# Patient Record
Sex: FEMALE | Race: WHITE | Employment: OTHER | URBAN - METROPOLITAN AREA
[De-identification: names, ages, dates, MRNs, and addresses within clinical notes are randomized per-mention and may not be internally consistent; named-entity substitution may affect disease eponyms.]

---

## 2017-01-13 ENCOUNTER — OFFICE VISIT (OUTPATIENT)
Dept: SURGERY | Age: 77
End: 2017-01-13

## 2017-01-13 VITALS
HEART RATE: 76 BPM | WEIGHT: 158 LBS | RESPIRATION RATE: 16 BRPM | BODY MASS INDEX: 29.08 KG/M2 | SYSTOLIC BLOOD PRESSURE: 130 MMHG | HEIGHT: 62 IN | TEMPERATURE: 97.6 F | DIASTOLIC BLOOD PRESSURE: 86 MMHG

## 2017-01-13 DIAGNOSIS — K43.2 INCISIONAL HERNIA, WITHOUT OBSTRUCTION OR GANGRENE: Primary | ICD-10-CM

## 2017-01-13 RX ORDER — SODIUM CHLORIDE 0.9 % (FLUSH) 0.9 %
5-10 SYRINGE (ML) INJECTION EVERY 8 HOURS
Status: CANCELLED | OUTPATIENT
Start: 2017-01-13

## 2017-01-13 RX ORDER — SODIUM CHLORIDE 0.9 % (FLUSH) 0.9 %
5-10 SYRINGE (ML) INJECTION AS NEEDED
Status: CANCELLED | OUTPATIENT
Start: 2017-01-13

## 2017-01-13 NOTE — PROGRESS NOTES
Patient is a 67year old female presenting for a follow up MRI test and breast exam. Patient reports she has lump  to upper chest. Patient feels food gets stuck in chest after eating. Denies any pain or changes in breasts.

## 2017-01-13 NOTE — PROGRESS NOTES
General Surgery Consult    Subjective:      HPI: Patient is a very pleasant 60-year-old female with past medical history that is remarkable for hypertension, hypercholesterolemia, vitamin D deficiency coronary artery disease and stage I left breast cancer. She presents today with complaints of a bulge in the epigastrium of the abdomen. She has pain at this bulge. She states that the bulge has been present since her heart surgery and increase in size and symptoms. She had a coronary artery bypass graft and chest tubes. She reports feeling of indigestion and burning at the hernia site.     Patient Active Problem List    Diagnosis Date Noted    Coronary artery disease involving native coronary artery of native heart without angina pectoris 12/16/2016    Precordial pain 12/16/2016    Coronary artery disease involving native coronary artery of native heart with angina pectoris ([facility] [address] 10/30/2016    Personal history of malignant neoplasm of breast 03/29/2016    Pre-operative cardiovascular examination 03/26/2014    Abnormal finding on EKG 03/26/2014    S/P partial mastectomy, left, with sentinel lymph node biopsy, 5/11     FHx: breast cancer     Breast cancer ([facility] [address] 12/06/2011    Insomnia 09/13/2011    Ataxic gait 10/21/2010    Vitamin D deficiency 09/14/2010    HTN (hypertension) 03/27/2010    Hypercholesteremia 03/27/2010    Anxiety 03/27/2010     Past Medical History   Diagnosis Date    Abnormal finding on EKG 3/26/2014     s/o inf wall mi asymptomatic     Anxiety     Ataxic gait 10/21/2010    Breast cancer ([facility] [address]     Diverticulosis     FHx: breast cancer     Herpes     Hypercholesterolemia     Hypertension     Insomnia     Osteopenia     Osteoporosis screening 2008     WNL per patient, GYN orders    Pap smear 8/2010     GYN, normal    Pre-operative cardiovascular examination 3/26/2014     for shoulder surgery     S/P partial mastectomy, left, with sentinel lymph node biopsy, 5/11     Screening mammogram 6/2010     GYN orders      Past Surgical History   Procedure Laterality Date    Hx breast biopsy       right (2000), left (2011)    Colonoscopy  09/2008    Hx hysterectomy  10/2009     complete, fibroid    Hx mastectomy  6./8/11     partial left with sentinel lymph node biopsy    Hx mastectomy Left 9/10/2014     LEFT PARTIAL MASTECTOMY performed by Carol Evans MD at [address] [place] [address] Hx back surgery  1980    Hx orthopaedic       left lower leg surgery at age 11   [facility]ock Hx orthopaedic Right 7/2014     ROTATOR CUFF    Pr cardiac surg procedure unlist       triple bypass      Family History   Problem Relation Age of Onset    Cancer Mother 48     Breast    Breast Cancer Mother 48    Heart Disease Father     Cancer Sister 76     Breast    Breast Cancer Sister 76    Heart Disease Brother     Diabetes Brother     Diabetes Sister       Social History   Substance Use Topics    Smoking status: Former Smoker    Smokeless tobacco: Never Used      Comment: quit age 26    Alcohol use Yes      Comment: occasional      Allergies   Allergen Reactions    Latex Unable to Obtain    Adhesive Hives    Codeine Nausea Only     Pt states she is not allergic. Prior to Admission medications    Medication Sig Start Date End Date Taking? Authorizing Provider   cholecalciferol, vitamin D3, (VITAMIN D3) 2,000 unit tab Take  by mouth daily. Yes Historical Provider   CALCIUM PO Take 1,200 mg by mouth daily. Yes Historical Provider   triamterene-hydroCHLOROthiazide (MAXZIDE) 75-50 mg per tablet Take 0.5 Tabs by mouth daily. Yes Historical Provider   metoprolol succinate (TOPROL-XL) 25 mg XL tablet Take 25 mg by mouth daily. Yes Historical Provider   pitavastatin (LIVALO) 4 mg tab tablet Take  by mouth daily. Yes Historical Provider   aspirin delayed-release 81 mg tablet Take 81 mg by mouth daily. 10/23/15  Yes Historical Provider   clonazePAM (KLONOPIN) 0.5 mg tablet 0.5 mg daily.    Yes Historical Provider   MULTIVITAMINS (MULTIVITAMIN PO) Take  by mouth daily. Yes Historical Provider       Review of Systems:    14 systems were reviewed. The results are as above in the HPI and otherwise negative. Objective:     Vitals:    01/13/17 1417   BP: 130/86   Pulse: 76   Resp: 16   Temp: 97.6 °F (36.4 °C)   TempSrc: Oral   Weight: 71.7 kg (158 lb)   Height: 5' 2\" (1.575 m)       Physical Exam:  GENERAL: alert, cooperative, no distress, appears stated age,   EYE: conjunctivae/corneas clear. PERRL, EOM's intact. Fundi benign,  THROAT & NECK: normal and no erythema or exudates noted. ,    LYMPHATIC: Cervical, supraclavicular, and axillary nodes normal. ,   LUNG: clear to auscultation bilaterally,   HEART: regular rate and rhythm, S1, S2 normal, no murmur, click, rub or gallop,   ABDOMEN: soft, nondistended, tender at the reducible epigastric hernia which measures approximately 5 cm in diameter. . Bowel sounds normal. No masses,  no organomegaly,   EXTREMITIES:  extremities normal, atraumatic, no cyanosis or edema,   SKIN: Normal.,   NEUROLOGIC: AOx3. Gait normal. Reflexes and motor strength normal and symmetric. Cranial nerves 2-12 and sensation grossly intact. ,     Data Review: To be done    Impression:     · Patient with extensive past medical history with peripheral increased risk of cardiac event or stroke during surgery including hypertension, hypercholesterolemia, coronary artery disease. She has a symptomatic epigastric incisional hernia.     Plan:     · Robot-assisted laparoscopic incisional hernia repair with mesh  · Consent on chart  · Preoperative orders written    Signed By: Giovana Bell MD     January 13, 2017

## 2017-01-16 ENCOUNTER — HOSPITAL ENCOUNTER (OUTPATIENT)
Dept: PREADMISSION TESTING | Age: 77
Discharge: HOME | End: 2017-01-16
Payer: MEDICARE

## 2017-01-16 ENCOUNTER — HOSPITAL ENCOUNTER (OUTPATIENT)
Dept: GENERAL RADIOLOGY | Age: 77
Discharge: HOME | End: 2017-01-16
Payer: MEDICARE

## 2017-01-16 DIAGNOSIS — Z01.818 PREOP TESTING: Primary | ICD-10-CM

## 2017-01-16 DIAGNOSIS — Z01.818 PREOP TESTING: ICD-10-CM

## 2017-01-16 DIAGNOSIS — K43.2 INCISIONAL HERNIA, WITHOUT OBSTRUCTION OR GANGRENE: ICD-10-CM

## 2017-01-16 LAB
ANION GAP BLD CALC-SCNC: 10 MMOL/L (ref 3–18)
BASOPHILS # BLD AUTO: 0 K/UL (ref 0–0.1)
BASOPHILS # BLD: 0 % (ref 0–2)
BUN SERPL-MCNC: 29 MG/DL (ref 7–18)
BUN/CREAT SERPL: 28 (ref 12–20)
CALCIUM SERPL-MCNC: 10.1 MG/DL (ref 8.5–10.1)
CHLORIDE SERPL-SCNC: 103 MMOL/L (ref 100–108)
CO2 SERPL-SCNC: 27 MMOL/L (ref 21–32)
CREAT SERPL-MCNC: 1.02 MG/DL (ref 0.6–1.3)
DIFFERENTIAL METHOD BLD: ABNORMAL
EOSINOPHIL # BLD: 0.2 K/UL (ref 0–0.4)
EOSINOPHIL NFR BLD: 3 % (ref 0–5)
ERYTHROCYTE [DISTWIDTH] IN BLOOD BY AUTOMATED COUNT: 14.6 % (ref 11.6–14.5)
GLUCOSE SERPL-MCNC: 99 MG/DL (ref 74–99)
HCT VFR BLD AUTO: 45 % (ref 35–45)
HGB BLD-MCNC: 14.7 G/DL (ref 12–16)
LYMPHOCYTES # BLD AUTO: 44 % (ref 21–52)
LYMPHOCYTES # BLD: 3 K/UL (ref 0.9–3.6)
MCH RBC QN AUTO: 31 PG (ref 24–34)
MCHC RBC AUTO-ENTMCNC: 32.7 G/DL (ref 31–37)
MCV RBC AUTO: 94.9 FL (ref 74–97)
MONOCYTES # BLD: 0.6 K/UL (ref 0.05–1.2)
MONOCYTES NFR BLD AUTO: 9 % (ref 3–10)
NEUTS SEG # BLD: 3 K/UL (ref 1.8–8)
NEUTS SEG NFR BLD AUTO: 44 % (ref 40–73)
PLATELET # BLD AUTO: 257 K/UL (ref 135–420)
PMV BLD AUTO: 9.7 FL (ref 9.2–11.8)
POTASSIUM SERPL-SCNC: 4.5 MMOL/L (ref 3.5–5.5)
RBC # BLD AUTO: 4.74 M/UL (ref 4.2–5.3)
SODIUM SERPL-SCNC: 140 MMOL/L (ref 136–145)
WBC # BLD AUTO: 6.8 K/UL (ref 4.6–13.2)

## 2017-01-16 PROCEDURE — 71020 XR CHEST PA LAT: CPT

## 2017-01-16 PROCEDURE — 80048 BASIC METABOLIC PNL TOTAL CA: CPT | Performed by: SURGERY

## 2017-01-16 PROCEDURE — 36415 COLL VENOUS BLD VENIPUNCTURE: CPT | Performed by: SURGERY

## 2017-01-16 PROCEDURE — 85025 COMPLETE CBC W/AUTO DIFF WBC: CPT | Performed by: SURGERY

## 2017-01-16 PROCEDURE — 93005 ELECTROCARDIOGRAM TRACING: CPT

## 2017-01-17 LAB
ATRIAL RATE: 74 BPM
CALCULATED P AXIS, ECG09: 30 DEGREES
CALCULATED R AXIS, ECG10: 10 DEGREES
CALCULATED T AXIS, ECG11: 43 DEGREES
DIAGNOSIS, 93000: NORMAL
P-R INTERVAL, ECG05: 180 MS
Q-T INTERVAL, ECG07: 398 MS
QRS DURATION, ECG06: 70 MS
QTC CALCULATION (BEZET), ECG08: 441 MS
VENTRICULAR RATE, ECG03: 74 BPM

## 2017-01-18 ENCOUNTER — ANESTHESIA EVENT (OUTPATIENT)
Dept: SURGERY | Age: 77
End: 2017-01-18
Payer: MEDICARE

## 2017-01-19 ENCOUNTER — ANESTHESIA (OUTPATIENT)
Dept: SURGERY | Age: 77
End: 2017-01-19
Payer: MEDICARE

## 2017-01-19 ENCOUNTER — HOSPITAL ENCOUNTER (OUTPATIENT)
Age: 77
LOS: 1 days | Discharge: HOME | End: 2017-01-19
Attending: SURGERY | Admitting: SURGERY
Payer: MEDICARE

## 2017-01-19 ENCOUNTER — SURGERY (OUTPATIENT)
Age: 77
End: 2017-01-19

## 2017-01-19 VITALS
DIASTOLIC BLOOD PRESSURE: 73 MMHG | TEMPERATURE: 96 F | BODY MASS INDEX: 29.08 KG/M2 | SYSTOLIC BLOOD PRESSURE: 121 MMHG | RESPIRATION RATE: 17 BRPM | HEART RATE: 84 BPM | WEIGHT: 158 LBS | OXYGEN SATURATION: 92 % | HEIGHT: 62 IN

## 2017-01-19 PROBLEM — K43.2 INCISIONAL HERNIA WITHOUT MENTION OF OBSTRUCTION OR GANGRENE: Status: ACTIVE | Noted: 2017-01-19

## 2017-01-19 PROCEDURE — 77030012012 HC AIRWY OP SFT TELE -A: Performed by: NURSE ANESTHETIST, CERTIFIED REGISTERED

## 2017-01-19 PROCEDURE — 77030012058: Performed by: SURGERY

## 2017-01-19 PROCEDURE — 74011250637 HC RX REV CODE- 250/637: Performed by: SURGERY

## 2017-01-19 PROCEDURE — 77030008683 HC TU ET CUF COVD -A: Performed by: NURSE ANESTHETIST, CERTIFIED REGISTERED

## 2017-01-19 PROCEDURE — 76210000028 HC REC RM PH II 4 TO 4.5 HR: Performed by: SURGERY

## 2017-01-19 PROCEDURE — 77030020788: Performed by: SURGERY

## 2017-01-19 PROCEDURE — 77030002933 HC SUT MCRYL J&J -A: Performed by: SURGERY

## 2017-01-19 PROCEDURE — 74011000250 HC RX REV CODE- 250: Performed by: SURGERY

## 2017-01-19 PROCEDURE — 77030026438 HC STYL ET INTUB CARD -A: Performed by: NURSE ANESTHETIST, CERTIFIED REGISTERED

## 2017-01-19 PROCEDURE — 76010000875 HC OR TIME 1.5 TO 2HR INTENSV - TIER 2: Performed by: SURGERY

## 2017-01-19 PROCEDURE — 76210000017 HC OR PH I REC 1.5 TO 2 HR: Performed by: SURGERY

## 2017-01-19 PROCEDURE — 74011250636 HC RX REV CODE- 250/636: Performed by: NURSE ANESTHETIST, CERTIFIED REGISTERED

## 2017-01-19 PROCEDURE — 74011250636 HC RX REV CODE- 250/636

## 2017-01-19 PROCEDURE — C1781 MESH (IMPLANTABLE): HCPCS | Performed by: SURGERY

## 2017-01-19 PROCEDURE — 77030034850: Performed by: SURGERY

## 2017-01-19 PROCEDURE — 77030032490 HC SLV COMPR SCD KNE COVD -B: Performed by: SURGERY

## 2017-01-19 PROCEDURE — 74011000250 HC RX REV CODE- 250

## 2017-01-19 PROCEDURE — 77030031139 HC SUT VCRL2 J&J -A: Performed by: SURGERY

## 2017-01-19 PROCEDURE — 77030033263 HC DRSG MEPILEX 16-48IN BORD MOLN -B: Performed by: SURGERY

## 2017-01-19 PROCEDURE — 77030021454 HC SUT VLOC ABS COVD -B: Performed by: SURGERY

## 2017-01-19 PROCEDURE — 76060000034 HC ANESTHESIA 1.5 TO 2 HR: Performed by: SURGERY

## 2017-01-19 PROCEDURE — 77030010939 HC CLP LIG TELE -B: Performed by: SURGERY

## 2017-01-19 PROCEDURE — 77030009848 HC PASSR SUT SET COOP -C: Performed by: SURGERY

## 2017-01-19 PROCEDURE — 77030016151 HC PROTCTR LNS DFOG COVD -B: Performed by: SURGERY

## 2017-01-19 PROCEDURE — 77030035045 HC TRCR ENDOSC VRSPRT BLDLSS COVD -B: Performed by: SURGERY

## 2017-01-19 PROCEDURE — 77030018836 HC SOL IRR NACL ICUM -A: Performed by: SURGERY

## 2017-01-19 PROCEDURE — 74011250637 HC RX REV CODE- 250/637: Performed by: NURSE ANESTHETIST, CERTIFIED REGISTERED

## 2017-01-19 PROCEDURE — 77030020782 HC GWN BAIR PAWS FLX 3M -B: Performed by: SURGERY

## 2017-01-19 PROCEDURE — 77030011640 HC PAD GRND REM COVD -A: Performed by: SURGERY

## 2017-01-19 PROCEDURE — 77030014086 HC TRCR ENDOSC AMR -B: Performed by: SURGERY

## 2017-01-19 PROCEDURE — 74011250636 HC RX REV CODE- 250/636: Performed by: SURGERY

## 2017-01-19 PROCEDURE — 77030010507 HC ADH SKN DERMBND J&J -B: Performed by: SURGERY

## 2017-01-19 PROCEDURE — 77030018706 HC CORD MPLR COVD -A: Performed by: SURGERY

## 2017-01-19 DEVICE — MESH W/ECHO PS 15.2CM CIR -- VENTRALIGHT ORDER BY CA: Type: IMPLANTABLE DEVICE | Site: ABDOMEN | Status: FUNCTIONAL

## 2017-01-19 RX ORDER — FENTANYL CITRATE 50 UG/ML
INJECTION, SOLUTION INTRAMUSCULAR; INTRAVENOUS AS NEEDED
Status: DISCONTINUED | OUTPATIENT
Start: 2017-01-19 | End: 2017-01-19 | Stop reason: HOSPADM

## 2017-01-19 RX ORDER — SODIUM CHLORIDE 0.9 % (FLUSH) 0.9 %
5-10 SYRINGE (ML) INJECTION EVERY 8 HOURS
Status: DISCONTINUED | OUTPATIENT
Start: 2017-01-19 | End: 2017-01-19 | Stop reason: HOSPADM

## 2017-01-19 RX ORDER — ONDANSETRON 2 MG/ML
INJECTION INTRAMUSCULAR; INTRAVENOUS AS NEEDED
Status: DISCONTINUED | OUTPATIENT
Start: 2017-01-19 | End: 2017-01-19 | Stop reason: HOSPADM

## 2017-01-19 RX ORDER — LIDOCAINE HYDROCHLORIDE 20 MG/ML
INJECTION, SOLUTION EPIDURAL; INFILTRATION; INTRACAUDAL; PERINEURAL AS NEEDED
Status: DISCONTINUED | OUTPATIENT
Start: 2017-01-19 | End: 2017-01-19 | Stop reason: HOSPADM

## 2017-01-19 RX ORDER — BUPIVACAINE HYDROCHLORIDE AND EPINEPHRINE 2.5; 5 MG/ML; UG/ML
INJECTION, SOLUTION EPIDURAL; INFILTRATION; INTRACAUDAL; PERINEURAL AS NEEDED
Status: DISCONTINUED | OUTPATIENT
Start: 2017-01-19 | End: 2017-01-19 | Stop reason: HOSPADM

## 2017-01-19 RX ORDER — MIDAZOLAM HYDROCHLORIDE 1 MG/ML
INJECTION, SOLUTION INTRAMUSCULAR; INTRAVENOUS AS NEEDED
Status: DISCONTINUED | OUTPATIENT
Start: 2017-01-19 | End: 2017-01-19 | Stop reason: HOSPADM

## 2017-01-19 RX ORDER — SODIUM CHLORIDE, SODIUM LACTATE, POTASSIUM CHLORIDE, CALCIUM CHLORIDE 600; 310; 30; 20 MG/100ML; MG/100ML; MG/100ML; MG/100ML
75 INJECTION, SOLUTION INTRAVENOUS CONTINUOUS
Status: DISCONTINUED | OUTPATIENT
Start: 2017-01-19 | End: 2017-01-19 | Stop reason: HOSPADM

## 2017-01-19 RX ORDER — NEOSTIGMINE METHYLSULFATE 5 MG/5 ML
SYRINGE (ML) INTRAVENOUS AS NEEDED
Status: DISCONTINUED | OUTPATIENT
Start: 2017-01-19 | End: 2017-01-19 | Stop reason: HOSPADM

## 2017-01-19 RX ORDER — GLYCOPYRROLATE 0.2 MG/ML
INJECTION INTRAMUSCULAR; INTRAVENOUS AS NEEDED
Status: DISCONTINUED | OUTPATIENT
Start: 2017-01-19 | End: 2017-01-19 | Stop reason: HOSPADM

## 2017-01-19 RX ORDER — DOCUSATE SODIUM 100 MG/1
100 CAPSULE, LIQUID FILLED ORAL 2 TIMES DAILY
Qty: 60 CAP | Refills: 0 | Status: SHIPPED | OUTPATIENT
Start: 2017-01-19 | End: 2017-02-18

## 2017-01-19 RX ORDER — FAMOTIDINE 20 MG/1
20 TABLET, FILM COATED ORAL ONCE
Status: COMPLETED | OUTPATIENT
Start: 2017-01-19 | End: 2017-01-19

## 2017-01-19 RX ORDER — SODIUM CHLORIDE 0.9 % (FLUSH) 0.9 %
5-10 SYRINGE (ML) INJECTION AS NEEDED
Status: DISCONTINUED | OUTPATIENT
Start: 2017-01-19 | End: 2017-01-19 | Stop reason: HOSPADM

## 2017-01-19 RX ORDER — CEFAZOLIN SODIUM 2 G/50ML
2 SOLUTION INTRAVENOUS ONCE
Status: COMPLETED | OUTPATIENT
Start: 2017-01-19 | End: 2017-01-19

## 2017-01-19 RX ORDER — SUCCINYLCHOLINE CHLORIDE 20 MG/ML
INJECTION INTRAMUSCULAR; INTRAVENOUS AS NEEDED
Status: DISCONTINUED | OUTPATIENT
Start: 2017-01-19 | End: 2017-01-19 | Stop reason: HOSPADM

## 2017-01-19 RX ORDER — HYDROCODONE BITARTRATE AND ACETAMINOPHEN 5; 325 MG/1; MG/1
1-2 TABLET ORAL
Status: COMPLETED | OUTPATIENT
Start: 2017-01-19 | End: 2017-01-19

## 2017-01-19 RX ORDER — KETOROLAC TROMETHAMINE 30 MG/ML
INJECTION, SOLUTION INTRAMUSCULAR; INTRAVENOUS AS NEEDED
Status: DISCONTINUED | OUTPATIENT
Start: 2017-01-19 | End: 2017-01-19 | Stop reason: HOSPADM

## 2017-01-19 RX ORDER — HYDROMORPHONE HYDROCHLORIDE 2 MG/ML
0.5 INJECTION, SOLUTION INTRAMUSCULAR; INTRAVENOUS; SUBCUTANEOUS
Status: DISCONTINUED | OUTPATIENT
Start: 2017-01-19 | End: 2017-01-19 | Stop reason: HOSPADM

## 2017-01-19 RX ORDER — PROPOFOL 10 MG/ML
INJECTION, EMULSION INTRAVENOUS AS NEEDED
Status: DISCONTINUED | OUTPATIENT
Start: 2017-01-19 | End: 2017-01-19 | Stop reason: HOSPADM

## 2017-01-19 RX ORDER — EPHEDRINE SULFATE/0.9% NACL/PF 25 MG/5 ML
SYRINGE (ML) INTRAVENOUS AS NEEDED
Status: DISCONTINUED | OUTPATIENT
Start: 2017-01-19 | End: 2017-01-19 | Stop reason: HOSPADM

## 2017-01-19 RX ORDER — ROCURONIUM BROMIDE 10 MG/ML
INJECTION, SOLUTION INTRAVENOUS AS NEEDED
Status: DISCONTINUED | OUTPATIENT
Start: 2017-01-19 | End: 2017-01-19 | Stop reason: HOSPADM

## 2017-01-19 RX ORDER — HYDROCODONE BITARTRATE AND ACETAMINOPHEN 5; 325 MG/1; MG/1
2 TABLET ORAL
Qty: 60 TAB | Refills: 0 | Status: SHIPPED | OUTPATIENT
Start: 2017-01-19 | End: 2017-02-01 | Stop reason: ALTCHOICE

## 2017-01-19 RX ORDER — DEXAMETHASONE SODIUM PHOSPHATE 4 MG/ML
INJECTION, SOLUTION INTRA-ARTICULAR; INTRALESIONAL; INTRAMUSCULAR; INTRAVENOUS; SOFT TISSUE AS NEEDED
Status: DISCONTINUED | OUTPATIENT
Start: 2017-01-19 | End: 2017-01-19 | Stop reason: HOSPADM

## 2017-01-19 RX ADMIN — PROPOFOL 100 MG: 10 INJECTION, EMULSION INTRAVENOUS at 08:33

## 2017-01-19 RX ADMIN — ROCURONIUM BROMIDE 20 MG: 10 INJECTION, SOLUTION INTRAVENOUS at 08:45

## 2017-01-19 RX ADMIN — LIDOCAINE HYDROCHLORIDE 20 MG: 20 INJECTION, SOLUTION EPIDURAL; INFILTRATION; INTRACAUDAL; PERINEURAL at 08:33

## 2017-01-19 RX ADMIN — FENTANYL CITRATE 50 MCG: 50 INJECTION, SOLUTION INTRAMUSCULAR; INTRAVENOUS at 08:33

## 2017-01-19 RX ADMIN — ONDANSETRON 4 MG: 2 INJECTION INTRAMUSCULAR; INTRAVENOUS at 09:27

## 2017-01-19 RX ADMIN — GLYCOPYRROLATE 0.6 MG: 0.2 INJECTION INTRAMUSCULAR; INTRAVENOUS at 10:03

## 2017-01-19 RX ADMIN — FENTANYL CITRATE 50 MCG: 50 INJECTION, SOLUTION INTRAMUSCULAR; INTRAVENOUS at 10:16

## 2017-01-19 RX ADMIN — FENTANYL CITRATE 50 MCG: 50 INJECTION, SOLUTION INTRAMUSCULAR; INTRAVENOUS at 09:00

## 2017-01-19 RX ADMIN — CEFAZOLIN SODIUM 2 G: 2 SOLUTION INTRAVENOUS at 08:27

## 2017-01-19 RX ADMIN — Medication 5 MG: at 08:42

## 2017-01-19 RX ADMIN — BUPIVACAINE HYDROCHLORIDE AND EPINEPHRINE BITARTRATE 30 ML: 2.5; .0091 INJECTION, SOLUTION EPIDURAL; INFILTRATION; INTRACAUDAL; PERINEURAL at 09:17

## 2017-01-19 RX ADMIN — HYDROCODONE BITARTRATE AND ACETAMINOPHEN 2 TABLET: 5; 325 TABLET ORAL at 12:36

## 2017-01-19 RX ADMIN — Medication 5 MG: at 09:20

## 2017-01-19 RX ADMIN — FAMOTIDINE 20 MG: 20 TABLET ORAL at 07:03

## 2017-01-19 RX ADMIN — Medication 3 MG: at 10:03

## 2017-01-19 RX ADMIN — MIDAZOLAM HYDROCHLORIDE 2 MG: 1 INJECTION, SOLUTION INTRAMUSCULAR; INTRAVENOUS at 08:27

## 2017-01-19 RX ADMIN — SODIUM CHLORIDE, SODIUM LACTATE, POTASSIUM CHLORIDE, AND CALCIUM CHLORIDE 75 ML/HR: 600; 310; 30; 20 INJECTION, SOLUTION INTRAVENOUS at 11:22

## 2017-01-19 RX ADMIN — DEXAMETHASONE SODIUM PHOSPHATE 4 MG: 4 INJECTION, SOLUTION INTRA-ARTICULAR; INTRALESIONAL; INTRAMUSCULAR; INTRAVENOUS; SOFT TISSUE at 08:40

## 2017-01-19 RX ADMIN — PROPOFOL 30 MG: 10 INJECTION, EMULSION INTRAVENOUS at 09:06

## 2017-01-19 RX ADMIN — HYDROMORPHONE HYDROCHLORIDE 0.5 MG: 2 INJECTION, SOLUTION INTRAMUSCULAR; INTRAVENOUS; SUBCUTANEOUS at 11:22

## 2017-01-19 RX ADMIN — FENTANYL CITRATE 50 MCG: 50 INJECTION, SOLUTION INTRAMUSCULAR; INTRAVENOUS at 10:21

## 2017-01-19 RX ADMIN — SODIUM CHLORIDE, SODIUM LACTATE, POTASSIUM CHLORIDE, AND CALCIUM CHLORIDE 75 ML/HR: 600; 310; 30; 20 INJECTION, SOLUTION INTRAVENOUS at 07:33

## 2017-01-19 RX ADMIN — ROCURONIUM BROMIDE 5 MG: 10 INJECTION, SOLUTION INTRAVENOUS at 09:22

## 2017-01-19 RX ADMIN — HYDROMORPHONE HYDROCHLORIDE 0.5 MG: 2 INJECTION, SOLUTION INTRAMUSCULAR; INTRAVENOUS; SUBCUTANEOUS at 11:06

## 2017-01-19 RX ADMIN — HYDROMORPHONE HYDROCHLORIDE 0.5 MG: 2 INJECTION, SOLUTION INTRAMUSCULAR; INTRAVENOUS; SUBCUTANEOUS at 11:36

## 2017-01-19 RX ADMIN — KETOROLAC TROMETHAMINE 30 MG: 30 INJECTION, SOLUTION INTRAMUSCULAR; INTRAVENOUS at 10:03

## 2017-01-19 RX ADMIN — Medication 5 MG: at 08:43

## 2017-01-19 RX ADMIN — Medication 5 MG: at 08:45

## 2017-01-19 RX ADMIN — SUCCINYLCHOLINE CHLORIDE 100 MG: 20 INJECTION INTRAMUSCULAR; INTRAVENOUS at 08:33

## 2017-01-19 RX ADMIN — FENTANYL CITRATE 50 MCG: 50 INJECTION, SOLUTION INTRAMUSCULAR; INTRAVENOUS at 10:27

## 2017-01-19 NOTE — PROGRESS NOTES
conducted a pre-surgery visit with Neli Thompson, who is a 67 y.o.,female. The  provided the following Interventions:  Initiated a relationship of care and support. Plan:  Chaplains will continue to follow and will provide pastoral care on an as needed/requested basis.  recommends bedside caregivers page  on duty if patient shows signs of acute spiritual or emotional distress.     [address] [facility] Certified [facility]   [phone]

## 2017-01-19 NOTE — INTERVAL H&P NOTE
H&P Update:  Henna Watson was seen and examined. History and physical has been reviewed. The patient has been examined.  There have been no significant clinical changes since the completion of the originally dated History and Physical.    Signed By: Matt Yi MD     January 19, 2017 2:32 PM

## 2017-01-19 NOTE — OP NOTES
[place] [address] [address]                                 OPERATIVE REPORT    PATIENT:     Moni Soliman  MRN:           [id number]    DATE:   2017  BILLIN  ROOM:        OR/PL  ATTENDING:   Rudolph Schumacher MD  DICTATING:   Rudolph Schumacher MD      PREOPERATIVE DIAGNOSIS: Ventral hernia. POSTOPERATIVE DIAGNOSIS: Same hernia. PROCEDURES PERFORMED: Robot-assisted laparoscopic incisional hernia repair  with mesh. SURGEON: Richmond Fletcher MD.    ASSISTANT: Matt Loya SA    ANESTHESIA: General and local (0.25% Marcaine with epinephrine). FINDINGS: Ventral hernia containing omentum. SPECIMENS REMOVED: None    ESTIMATED BLOOD LOSS: 20 mL. FLUIDS: 650 mL. OPERATIVE INDICATION: The patient with a painful ventral hernia. DESCRIPTION OF OPERATION:  The patient was identified in the holding area, where consent for robot assisted laparoscopic ventral  hernia repair with mesh was verified. The patient was taken to the operating room  where she was placed under general anesthesia in the supine position. The  left arm was tucked to the patient's side. The abdomen was prepped and  draped in a sterile fashion using chlorhexidine solution and sterile  drapes. Chasity Shape was used to add an additional protective barrier between the  skin and the foreign bodies. The time-out was performed to ensure correct  procedure. The local anesthetic was infiltrated into the skin and deep  dermal tissues at each trocar site prior to incision. The initial trocar was placed in the midline 5 cm below the umbilicus. This was to accommodate a 12 mm  blunt-tip trocar assembled to the 5 mm 0-degree scope to create the  Visi-Port system. Safe entry into the peritoneal cavity was visualized. The  pneumoperitoneum was obtained using carbon dioxide gas to 15 mmHg.  The  abdomen was briefly explored laparoscopically. The second trocar was placed in the left lower quadrant one handsbreath lateral to the umbilical trocar. This was a 8 mm blunt tipped trocar. A third trocar  was placed in the right lower quadrant on handsbreadth lateral to umbilical trocar. The 15.2 cm Ventralite ST mesh with the Echo positioning system was placed into the peritoneal cavity. Two of the 2-0 V-Loc sutures were placed into the peritoneal cavity. The robot was then docked. The patient's position prior to docking was supine. The scissor was used at arm #1. The prograsp was used at arm #2 with the camera inserted in the midaxillary trocar. The attention was turned first to the exploration of the umbilicus and the  omental attachments in this area. The omentum was dissected away from the hernia site with a combination of electrocautery and blunt dissection. The hernia sac was dissected out of the defect. The falciform ligament was then taken down cephalad. The balloon port of the Echo positioning system was then taken through the center of the  defect, and used to secure the mesh to the abdominal wall by inflating it. The V-Loc sutures were used to secure the mesh to the abdominal wall. The mesh set nicely against the abdominal wall. The Echo Positioning  System was removed. Two needles were removed from the peritoneal cavity. The fascia at the 12 mm trocar site was closed using 0 Vicryl suture in the Hardy fsboWOW fascial closure system. All trocars were removed under direct visualization. The additional local anesthetic was infiltrated into the skin and deep  dermal tissues at each trocar site. A 4-0 Vicryl suture was used to close  the skin at each trocar site, and Dermabond was used as a wound sealant. The patient tolerated the procedure very well. DISPOSITION: She was stable, extubated upon transport to the recovery  room.         Peggy Peralta MD      D: 1/19/2017

## 2017-01-19 NOTE — PROGRESS NOTES
Rigo Goodman  67 y.o.  [id number]  1940  [id number]    Referring physician: Surgeon(s):  Jorge L Simons MD    Procedure: Additional peripheral IV;      Size:  20 G    Side: left    Site: hand left, condition no redness,patent        Indication: additional IV access      Michael Durand MD  1/19/2017  9:50 AM

## 2017-01-19 NOTE — DISCHARGE SUMMARY
Physician Discharge Summary     Patient ID:  Stephani Stokes  [id number]  30 y.o.  1940    Admit Date: 1/19/2017    Discharge Date: 1/19/2017    Admission Diagnoses: Incisional hernia without obstruction or gangrene [K43.2]    Discharge Diagnoses:    Problem List as of 1/19/2017  Date Reviewed: 1/13/2017          Codes Class Noted - Resolved    Incisional hernia without mention of obstruction or gangrene ICD-10-CM: K43.2  ICD-9-CM: 553.21  1/19/2017 - Present        Coronary artery disease involving native coronary artery of native heart without angina pectoris ICD-10-CM: I25.10  ICD-9-CM: 414.01  12/16/2016 - Present        Precordial pain ICD-10-CM: R07.2  ICD-9-CM: 786.51  12/16/2016 - Present        Coronary artery disease involving native coronary artery of native heart with angina pectoris ([facility] [address] ICD-10-CM: I25.119  ICD-9-CM: 414.01, 413.9  10/30/2016 - Present        Personal history of malignant neoplasm of breast ICD-10-CM: Z85.3  ICD-9-CM: V10.3  3/29/2016 - Present        Pre-operative cardiovascular examination ICD-10-CM: Z01.810  ICD-9-CM: V72.81  3/26/2014 - Present    Overview Signed 3/26/2014 12:08 PM by Maira Lee MD     for shoulder surgery             Abnormal finding on EKG ICD-10-CM: R94.31  ICD-9-CM: 794.31  3/26/2014 - Present    Overview Signed 3/26/2014 12:08 PM by Maira Lee MD     s/o inf wall mi  asymptomatic             S/P partial mastectomy, left, with sentinel lymph node biopsy, 5/11 ICD-10-CM: Z90.10  ICD-9-CM: V45.71  Unknown - Present        FHx: breast cancer ICD-10-CM: Z80.3  ICD-9-CM: V16.3  Unknown - Present        Breast cancer ([facility] [address] ICD-10-CM: C50.919  ICD-9-CM: 174.9  12/6/2011 - Present        Insomnia ICD-10-CM: G47.00  ICD-9-CM: 780.52  9/13/2011 - Present        Ataxic gait ICD-10-CM: R26.0  ICD-9-CM: 781.2  10/21/2010 - Present    Overview Signed 10/21/2010  9:16 AM by Jerry Rdz MD     10+ years with balance issues  10/11/10: Dr. Ira De La Torre testing MRI brain and EMG RLE             Vitamin D deficiency ICD-10-CM: E55.9  ICD-9-CM: 268.9  9/14/2010 - Present        HTN (hypertension) ICD-10-CM: I10  ICD-9-CM: 401.9  3/27/2010 - Present        Hypercholesteremia ICD-10-CM: E78.00  ICD-9-CM: 272.0  3/27/2010 - Present        Anxiety ICD-10-CM: F41.9  ICD-9-CM: 300.00  3/27/2010 - Present               Admission Condition: Good    Discharge Condition: Good    Last Procedure: Procedure(s):  ROBOTIC ASSISTED LAPAROSCOPIC [address] Course:   Normal hospital course for this procedure. Consults: None    Significant Diagnostic Studies: None    Disposition: home    Patient Instructions:   Current Discharge Medication List      START taking these medications    Details   HYDROcodone-acetaminophen (NORCO) 5-325 mg per tablet Take 2 Tabs by mouth every four (4) hours as needed for Pain. Max Daily Amount: 12 Tabs. Qty: 60 Tab, Refills: 0      docusate sodium (COLACE) 100 mg capsule Take 1 Cap by mouth two (2) times a day for 30 days. Qty: 60 Cap, Refills: 0         CONTINUE these medications which have NOT CHANGED    Details   cholecalciferol, vitamin D3, (VITAMIN D3) 2,000 unit tab Take  by mouth daily. CALCIUM PO Take 1,200 mg by mouth daily. triamterene-hydroCHLOROthiazide (MAXZIDE) 75-50 mg per tablet Take 0.5 Tabs by mouth daily. metoprolol succinate (TOPROL-XL) 25 mg XL tablet Take 25 mg by mouth daily. aspirin delayed-release 81 mg tablet Take 81 mg by mouth daily. clonazePAM (KLONOPIN) 0.5 mg tablet 0.5 mg daily. MULTIVITAMINS (MULTIVITAMIN PO) Take  by mouth daily. Activity: See surgical instructions  Diet: Low fat, Low cholesterol  Wound Care: As directed    Follow-up with Dr. Rosangela Echeverria in 2 weeks.   Follow-up tests/labs None    Signed:  Sailaja Quinn MD  1/19/2017  10:27 AM

## 2017-01-19 NOTE — IP AVS SNAPSHOT
Alec Brad 
 
 
 [address] Patient: Dariana Marques 
MRN: [id number] You are allergic to the following Allergen Reactions Latex Unable to Obtain Adhesive Hives Codeine Nausea Only Pt states she is not allergic. Recent Documentation Height Weight BMI OB Status Smoking Status 1.575 m 71.7 kg 28.9 kg/m2 Hysterectomy Former Smoker Emergency Contacts Name Discharge Info Relation Home Work Mobile Dante Jama  Other Relative [6] [phone] About your hospitalization You were admitted on:  January 19, 2017 You last received care in the:  [facility] You were discharged on:  January 19, 2017 Unit phone number:  [phone] Why you were hospitalized Your primary diagnosis was:  Not on File Your diagnoses also included:  Incisional Hernia Without Mention Of Obstruction Or Gangrene Providers Seen During Your Hospitalizations Provider Role Specialty Primary office phone Chrissy Epstein MD Attending Provider General Surgery [phone] Your Primary Care Physician (PCP) Primary Care Physician Office Phone Office Fax Brian Davies [phone] ** None ** Follow-up Information Follow up With Details Comments Contact Info Chrissy Epstein MD Follow up in 2 week(s)  [address] [facility] 
[phone] Rodrigo Spain MD   [place] [address] [address] [facility] 
[phone] Your Appointments Wednesday February 01, 2017 11:30 AM EST  
POST OP with Chrissy Epstein MD  
[place] [address] ([address] [facility] [address] [facility]  
[phone] Current Discharge Medication List  
  
START taking these medications Dose & Instructions Dispensing Information Comments Morning Noon Evening Bedtime  
 docusate sodium 100 mg capsule Commonly known as:  Melvenia Clipper Your next dose is: Today, Tomorrow Other:  _________ Dose:  100 mg Take 1 Cap by mouth two (2) times a day for 30 days. Quantity:  60 Cap Refills:  0 HYDROcodone-acetaminophen 5-325 mg per tablet Commonly known as:  Deisy Fabio Your next dose is: Today, Tomorrow Other:  _________ Dose:  2 Tab Take 2 Tabs by mouth every four (4) hours as needed for Pain. Max Daily Amount: 12 Tabs. Quantity:  60 Tab Refills:  0 CONTINUE these medications which have NOT CHANGED Dose & Instructions Dispensing Information Comments Morning Noon Evening Bedtime  
 aspirin delayed-release 81 mg tablet Your next dose is: Today, Tomorrow Other:  _________ Dose:  81 mg Take 81 mg by mouth daily. Refills:  0  
     
   
   
   
  
 CALCIUM PO Your next dose is: Today, Tomorrow Other:  _________ Dose:  1200 mg Take 1,200 mg by mouth daily. Refills:  0  
     
   
   
   
  
 clonazePAM 0.5 mg tablet Commonly known as:  Elisa Pelt Your next dose is: Today, Tomorrow Other:  _________ Dose:  0.5 mg  
0.5 mg daily. Refills:  0  
     
   
   
   
  
 metoprolol succinate 25 mg XL tablet Commonly known as:  TOPROL-XL Your next dose is: Today, Tomorrow Other:  _________ Dose:  25 mg Take 25 mg by mouth daily. Refills:  0 MULTIVITAMIN PO Your next dose is: Today, Tomorrow Other:  _________ Take  by mouth daily. Refills:  0  
     
   
   
   
  
 triamterene-hydroCHLOROthiazide 75-50 mg per tablet Commonly known as:  Levie Minors Your next dose is: Today, Tomorrow Other:  _________ Dose:  0.5 Tab Take 0.5 Tabs by mouth daily. Refills:  0  
     
   
   
   
  
 VITAMIN D3 2,000 unit Tab Generic drug:  cholecalciferol (vitamin D3) Your next dose is: Today, Tomorrow Other:  _________ Take  by mouth daily. Refills:  0 Where to Get Your Medications These medications were sent to [facility] [address] [facility] [address] Phone:  [phone]  
  docusate sodium 100 mg capsule Information on where to get these meds will be given to you by the nurse or doctor. ! Ask your nurse or doctor about these medications HYDROcodone-acetaminophen 5-325 mg per tablet Discharge Instructions Pt may shower. Allow soap and water to run over the incision. No driving or operating heavy machinery while on narcotic pain medications. No strenuous activity or contact sports for two weeks. No lifting greater than 15 lbs for 2 weeks. Call MD for any redness, swelling, bleeding or pus at the incision. Also call for any nausea, vomiting, increased pain or pain uncontrolled by pain medicine. Abdominal Hernia Repair: What to Expect at Home Your Recovery After surgery to repair your hernia, you are likely to have pain for a few days. You may also feel like you have the flu, and you may have a low fever and feel tired and nauseated. This is common. You should feel better after a few days and will probably feel much better in 7 days. For several weeks you may feel twinges or pulling in the hernia repair when you move. You may have some bruising around the area of your hernia repair. This is normal. 
This care sheet gives you a general idea about how long it will take for you to recover. But each person recovers at a different pace. Follow the steps below to get better as quickly as possible. How can you care for yourself at home? Activity · Rest when you feel tired. Getting enough sleep will help you recover. · Try to walk each day. Start by walking a little more than you did the day before. Bit by bit, increase the amount you walk. Walking boosts blood flow and helps prevent pneumonia and constipation. · If your doctor gives you an abdominal binder to wear, use it as directed. This is an elastic bandage that wraps around your belly and upper hips. It helps support your belly muscles after surgery. · Avoid strenuous activities, such as biking, jogging, weight lifting, or aerobic exercise, until your doctor says it is okay. · Avoid lifting anything that would make you strain. This may include heavy grocery bags and milk containers, a heavy briefcase or backpack, cat litter or dog food bags, a vacuum , or a child. · Ask your doctor when you can drive again. · Most people are able to return to work within 1 to 2 weeks after surgery. But if your job requires that you to do heavy lifting or strenuous activity, you may need to take 4 to 6 weeks off from work. · You may shower 24 to 48 hours after surgery, if your doctor okays it. Pat the cut (incision) dry. Do not take a bath for the first 2 weeks, or until your doctor tells you it is okay. · Ask your doctor when it is okay for you to have sex. Diet · You can eat your normal diet. If your stomach is upset, try bland, low-fat foods like plain rice, broiled chicken, toast, and yogurt. · Drink plenty of fluids (unless your doctor tells you not to). · You may notice that your bowel movements are not regular right after your surgery. This is common. Avoid constipation and straining with bowel movements. You may want to take a fiber supplement every day. If you have not had a bowel movement after a couple of days, ask your doctor about taking a mild laxative. Medicines · Your doctor will tell you if and when you can restart your medicines.  He or she will also give you instructions about taking any new medicines. · If you take blood thinners, such as warfarin (Coumadin), clopidogrel (Plavix), or aspirin, be sure to talk to your doctor. He or she will tell you if and when to start taking those medicines again. Make sure that you understand exactly what your doctor wants you to do. · Be safe with medicines. Take pain medicines exactly as directed. ¨ If the doctor gave you a prescription medicine for pain, take it as prescribed. ¨ If you are not taking a prescription pain medicine, ask your doctor if you can take an over-the-counter medicine. · If your doctor prescribed antibiotics, take them as directed. Do not stop taking them just because you feel better. You need to take the full course of antibiotics. · If you think your pain medicine is making you sick to your stomach: 
¨ Take your medicine after meals (unless your doctor has told you not to). ¨ Ask your doctor for a different pain medicine. Incision care · If you have strips of tape on the cut (incision) the doctor made, leave the tape on for a week or until it falls off. Or follow your doctor's instructions for removing the tape. · If you have staples closing the cut, you will need to visit your doctor in 1 to 2 weeks to have them removed. · Wash the area daily with warm, soapy water, and pat it dry. Don't use hydrogen peroxide or alcohol, which can slow healing. You may cover the area with a gauze bandage if it weeps or rubs against clothing. Change the bandage every day. Other instructions · Hold a pillow over your incision when you cough or take deep breaths. This will support your belly and decrease your pain. · Do breathing exercises at home as instructed by your doctor. This will help prevent pneumonia. · If you had laparoscopic surgery, you may also have pain in your left shoulder. The pain usually lasts about a day or two. Follow-up care is a key part of your treatment and safety. Be sure to make and go to all appointments, and call your doctor if you are having problems. It's also a good idea to know your test results and keep a list of the medicines you take. When should you call for help? Call 911 anytime you think you may need emergency care. For example, call if: 
· You passed out (lost consciousness). · You have sudden chest pain and shortness of breath, or you cough up blood. · You have severe pain in your belly. Call your doctor now or seek immediate medical care if: 
· You are sick to your stomach and cannot keep fluids down. · You have signs of a blood clot, such as: 
¨ Pain in your calf, back of the knee, thigh, or groin. ¨ Redness and swelling in your leg or groin. · You have signs of infection, such as: 
¨ Increased pain, swelling, warmth, or redness. ¨ Red streaks leading from the incision. ¨ Pus draining from the incision. ¨ A fever. · You have trouble passing urine or stool, especially if you have mild pain or swelling in your lower belly. · Bright red blood has soaked through the bandage over your incision. Watch closely for changes in your health, and be sure to contact your doctor if: 
· Your swelling is getting worse. · Your swelling is not going down. · You still don't have a bowel movement after taking a laxative. Where can you learn more? Go to http://obinna-luda.info/. Enter B577 in the search box to learn more about \"Abdominal Hernia Repair: What to Expect at Home. \" Current as of: August 9, 2016 Content Version: 11.1 © 4383-7141 [employer]. Care instructions adapted under license by [employer] (which disclaims liability or warranty for this information).  If you have questions about a medical condition or this instruction, always ask your healthcare professional. [address] any warranty or liability for your use of this information. DISCHARGE SUMMARY from Nurse The following personal items are in your possession at time of discharge: 
 
Dental Appliances: None Visual Aid: Glasses Home Medications: None Jewelry: None Clothing: Jacket/Coat, Pants, Footwear, Socks, Shirt, Undergarments (With friend Vermillion) Other Valuables: Cell Phone (With friend Vermillion) PATIENT INSTRUCTIONS: 
 
 
F-face looks uneven A-arms unable to move or move unevenly S-speech slurred or non-existent T-time-call 911 as soon as signs and symptoms begin-DO NOT go Back to bed or wait to see if you get better-TIME IS BRAIN. Warning Signs of HEART ATTACK Call 911 if you have these symptoms: 
? Chest discomfort. Most heart attacks involve discomfort in the center of the chest that lasts more than a few minutes, or that goes away and comes back. It can feel like uncomfortable pressure, squeezing, fullness, or pain. ? Discomfort in other areas of the upper body. Symptoms can include pain or discomfort in one or both arms, the back, neck, jaw, or stomach. ? Shortness of breath with or without chest discomfort. ? Other signs may include breaking out in a cold sweat, nausea, or lightheadedness. Don't wait more than five minutes to call [address]! Fast action can save your life. Calling 911 is almost always the fastest way to get lifesaving treatment. Emergency Medical Services staff can begin treatment when they arrive  up to an hour sooner than if someone gets to the hospital by car. The discharge information has been reviewed with the patient and family. The patient and family verbalized understanding. Discharge medications reviewed with the patient and family and appropriate educational materials and side effects teaching were provided. Hydrocodone/Acetaminophen (By mouth) Acetaminophen (e-eupi-r-MIN-oh-fen), Hydrocodone Bitartrate (iwa-yvxr-PMW-done bye-TAR-trate) Treats pain. This medicine contains a narcotic pain reliever. Brand Name(s):Hycet, Lorcet, Lorcet HD, Lorcet Plus, Lortab 10/325, Lortab 5/325, Lortab 7.5/325, Lortab Elixir, Norco, Verdrocet, Vicodin, Vicodin ES, Vicodin HP, Yfn José Luisie 5/300 There may be other brand names for this medicine. When This Medicine Should Not Be Used: This medicine is not right for everyone. Do not use it if you had an allergic reaction to acetaminophen, hydrocodone, or other narcotic medicines. How to Use This Medicine:  
Tablet, Liquid, Capsule · Your doctor will tell you how much medicine to use. Do not use more than directed. · Measure the oral liquid medicine with a marked measuring spoon, oral syringe, or medicine cup. · Drink plenty of liquids to help avoid constipation. · Missed dose: Take a dose as soon as you remember. If it is almost time for your next dose, wait until then and take a regular dose. Do not take extra medicine to make up for a missed dose. · Store the medicine in a closed container at room temperature, away from heat, moisture, and direct light. Drugs and Foods to Avoid: Ask your doctor or pharmacist before using any other medicine, including over-the-counter medicines, vitamins, and herbal products. · Some medicines can affect how hydrocodone/acetaminophen works. Tell your doctor if you are using any of the following: ¨ An MAO inhibitor ¨ Medicine to treat depression · This medicine can intensify the effects of alcohol, sedatives, or tranquilizers. Tell your doctor if you drink alcohol or if you are using any medicine that makes you sleepy, such as allergy medicine or another narcotic pain medicine. Acetaminophen can damage your liver, and your risk is higher if you also drink alcohol. Warnings While Using This Medicine: · Tell your doctor if you are pregnant or breastfeeding, or if you have lung disease, liver disease, kidney disease, problems urinating, an underactive thyroid, Yo disease, prostate problems, stomach problems, or a history of head injury or brain tumor. · This medicine may cause the following problems:  
¨ Liver problems ¨ Serious skin reactions · This medicine can be habit-forming. Do not use more than your prescribed dose. Call your doctor if you think your medicine is not working. · This medicine may make you dizzy or drowsy. Do not drive or doing anything else that could be dangerous until you know how this medicine affects you. · Too much of this medicine can cause death. Symptoms of an overdose include extreme dizziness or weakness, trouble breathing, slow heartbeat, seizure, and cold, clammy skin. · This medicine contains acetaminophen. Read the labels of all other medicines you are using to see if they also contain acetaminophen, or ask your doctor or pharmacist. Ruy Thompson not use more than 4 grams (4,000 milligrams) total of acetaminophen in one day. · Tell any doctor or dentist who treats you that you are using this medicine. This medicine may affect certain medical test results. · This medicine may cause constipation, especially with long-term use. Ask your doctor if you should use a laxative to prevent and treat constipation. · Keep all medicine out of the reach of children. Never share your medicine with anyone. Possible Side Effects While Using This Medicine:  
Call your doctor right away if you notice any of these side effects: · Allergic reaction: Itching or hives, swelling in your face or hands, swelling or tingling in your mouth or throat, chest tightness, trouble breathing · Blistering, peeling, red skin rash · Change in how much or how often you urinate · Dark urine or pale stools, loss of appetite, stomach pain, yellow skin or eyes · Extreme weakness, shallow breathing, slow heartbeat, sweating, cold or clammy skin · Lightheadedness, dizziness, fainting · Unusual bleeding or bruising If you notice these less serious side effects, talk with your doctor: · Constipation, nausea, vomiting · Tiredness or sleepiness If you notice other side effects that you think are caused by this medicine, tell your doctor. Call your doctor for medical advice about side effects. You may report side effects to FDA at 4-726-LGR-0954 © 2016 [address] is for End User's use only and may not be sold, redistributed or otherwise used for commercial purposes. The above information is an  only. It is not intended as medical advice for individual conditions or treatments. Talk to your doctor, nurse or pharmacist before following any medical regimen to see if it is safe and effective for you. Laxative, Stool Softeners (By mouth) Treats constipation by helping you have a bowel movement. Brand Name(s):Col-Rite, Colace, Colace Clear, DSS, Diocto, Diocto Liquid, Doc-Q-Lace, Docu Liquid, Docu-Soft, Docucal, Doculax, Docuprene, Docusil, Dok, Dulcolax There may be other brand names for this medicine. When This Medicine Should Not Be Used: You should not use this medicine if you have severe stomach pain, nausea, or vomiting. Stool softeners should not be used if you have severe stomach pain and do not know the cause. How to Use This Medicine:  
Capsule, Tablet, Liquid, Liquid Filled Capsule · Your doctor will tell you how much medicine to use. Do not use more than directed. · Follow the instructions on the medicine label if you are using this medicine without a prescription. · Drink 6 to 8 glasses of water daily while using any laxative. · To make the oral liquid taste better, you may mix it with one-half glass of milk or fruit juice. · Measure the oral liquid medicine with a marked measuring spoon, oral syringe, medicine cup, or medicine dropper. If a dose is missed: · Use the missed dose as soon as possible. · If you do not remember the missed dose until the next day, skip the missed dose and go back to your regular dosing schedule. · You should not use two doses at the same time. How to Store and Dispose of This Medicine: · Store the medicine in a tightly closed container at room temperature, away from heat and moisture. Do not store liquid-filled capsules in the refrigerator. · Keep all medicine out of the reach of children. Drugs and Foods to Avoid: Ask your doctor or pharmacist before using any other medicine, including over-the-counter medicines, vitamins, and herbal products. · You should not use mineral oil while you are using a stool softener. · You should not use a stool softener within 2 hours before or after taking any other medicines. Laxatives can keep other medicines from working correctly. Warnings While Using This Medicine: · If you are pregnant or breastfeeding, talk to your doctor before taking this medicine. · Do not give laxatives to children under 10years old unless you talk to your doctor. · You should not use this laxative for longer than 1 week unless approved by your doctor. Laxatives may be habit-forming and can harm your bowels if you use them too long. · Stool softeners usually work in 1 to 2 days, but for some people, results can take as long as 3 to 5 days. Possible Side Effects While Using This Medicine: If you notice these less serious side effects, talk with your doctor: · Nausea · Sore throat · Skin rash If you notice other side effects that you think are caused by this medicine, tell your doctor. Call your doctor for medical advice about side effects. You may report side effects to FDA at 3-239-FDA-3452 © 2016 [address] is for End User's use only and may not be sold, redistributed or otherwise used for commercial purposes. The above information is an  only. It is not intended as medical advice for individual conditions or treatments. Talk to your doctor, nurse or pharmacist before following any medical regimen to see if it is safe and effective for you. Discharge Orders None [employer] Announcement We are excited to announce that we are making your provider's discharge notes available to you in [employer]. You will see these notes when they are completed and signed by the physician that discharged you from your recent hospital stay. If you have any questions or concerns about any information you see in [employer], please call the Health Information Department where you were seen or reach out to your Primary Care Provider for more information about your plan of care. Introducing [facility]! Josey Castillo introduces [employer] patient portal. Now you can access parts of your medical record, email your doctor's office, and request medication refills online. 1. In your internet browser, go to [email or web address] 2. Click on the First Time User? Click Here link in the Sign In box. You will see the New Member Sign Up page. 3. Enter your [employer] Access Code exactly as it appears below. You will not need to use this code after youve completed the sign-up process. If you do not sign up before the expiration date, you must request a new code. · [employer] Access Code: [email or web address] Expires: 1/25/2017  8:44 AM 
 
4. Enter the last four digits of your Social Security Number (xxxx) and Date of Birth (mm/dd/yyyy) as indicated and click Submit. You will be taken to the next sign-up page. 5. Create a Figure 1t ID. This will be your [employer] login ID and cannot be changed, so think of one that is secure and easy to remember. 6. Create a [employer] password. You can change your password at any time. 7. Enter your Password Reset Question and Answer.  This can be used at a later time if you forget your password. 8. Enter your e-mail address. You will receive e-mail notification when new information is available in [address] 9. Click Sign Up. You can now view and download portions of your medical record. 10. Click the Download Summary menu link to download a portable copy of your medical information. If you have questions, please visit the Frequently Asked Questions section of the [employer] website. Remember, [employer] is NOT to be used for urgent needs. For medical emergencies, dial 911. Now available from your iPhone and Android! General Information Please provide this summary of care documentation to your next provider. Patient Signature:  ____________________________________________________________ Date:  ____________________________________________________________  
  
Darron Newcomer Provider Signature:  ____________________________________________________________ Date:  ____________________________________________________________

## 2017-01-19 NOTE — H&P (VIEW-ONLY)
General Surgery Consult    Subjective:      HPI: Patient is a very pleasant 65-year-old female with past medical history that is remarkable for hypertension, hypercholesterolemia, vitamin D deficiency coronary artery disease and stage I left breast cancer. She presents today with complaints of a bulge in the epigastrium of the abdomen. She has pain at this bulge. She states that the bulge has been present since her heart surgery and increase in size and symptoms. She had a coronary artery bypass graft and chest tubes. She reports feeling of indigestion and burning at the hernia site.     Patient Active Problem List    Diagnosis Date Noted    Coronary artery disease involving native coronary artery of native heart without angina pectoris 12/16/2016    Precordial pain 12/16/2016    Coronary artery disease involving native coronary artery of native heart with angina pectoris ([facility] [address] 10/30/2016    Personal history of malignant neoplasm of breast 03/29/2016    Pre-operative cardiovascular examination 03/26/2014    Abnormal finding on EKG 03/26/2014    S/P partial mastectomy, left, with sentinel lymph node biopsy, 5/11     FHx: breast cancer     Breast cancer ([facility] [address] 12/06/2011    Insomnia 09/13/2011    Ataxic gait 10/21/2010    Vitamin D deficiency 09/14/2010    HTN (hypertension) 03/27/2010    Hypercholesteremia 03/27/2010    Anxiety 03/27/2010     Past Medical History   Diagnosis Date    Abnormal finding on EKG 3/26/2014     s/o inf wall mi asymptomatic     Anxiety     Ataxic gait 10/21/2010    Breast cancer ([facility] [address]     Diverticulosis     FHx: breast cancer     Herpes     Hypercholesterolemia     Hypertension     Insomnia     Osteopenia     Osteoporosis screening 2008     WNL per patient, GYN orders    Pap smear 8/2010     GYN, normal    Pre-operative cardiovascular examination 3/26/2014     for shoulder surgery     S/P partial mastectomy, left, with sentinel lymph node biopsy, 5/11     Screening mammogram 6/2010     GYN orders      Past Surgical History   Procedure Laterality Date    Hx breast biopsy       right (2000), left (2011)    Colonoscopy  09/2008    Hx hysterectomy  10/2009     complete, fibroid    Hx mastectomy  6./8/11     partial left with sentinel lymph node biopsy    Hx mastectomy Left 9/10/2014     LEFT PARTIAL MASTECTOMY performed by Chirag Plaza MD at [address] Hx back surgery  1980    Hx orthopaedic       left lower leg surgery at age 11   [facility] Hx orthopaedic Right 7/2014     ROTATOR CUFF    Pr cardiac surg procedure unlist       triple bypass      Family History   Problem Relation Age of Onset    Cancer Mother 48     Breast    Breast Cancer Mother 48    Heart Disease Father     Cancer Sister 76     Breast    Breast Cancer Sister 76    Heart Disease Brother     Diabetes Brother     Diabetes Sister       Social History   Substance Use Topics    Smoking status: Former Smoker    Smokeless tobacco: Never Used      Comment: quit age 26    Alcohol use Yes      Comment: occasional      Allergies   Allergen Reactions    Latex Unable to Obtain    Adhesive Hives    Codeine Nausea Only     Pt states she is not allergic. Prior to Admission medications    Medication Sig Start Date End Date Taking? Authorizing Provider   cholecalciferol, vitamin D3, (VITAMIN D3) 2,000 unit tab Take  by mouth daily. Yes Historical Provider   CALCIUM PO Take 1,200 mg by mouth daily. Yes Historical Provider   triamterene-hydroCHLOROthiazide (MAXZIDE) 75-50 mg per tablet Take 0.5 Tabs by mouth daily. Yes Historical Provider   metoprolol succinate (TOPROL-XL) 25 mg XL tablet Take 25 mg by mouth daily. Yes Historical Provider   pitavastatin (LIVALO) 4 mg tab tablet Take  by mouth daily. Yes Historical Provider   aspirin delayed-release 81 mg tablet Take 81 mg by mouth daily. 10/23/15  Yes Historical Provider   clonazePAM (KLONOPIN) 0.5 mg tablet 0.5 mg daily.    Yes Historical Provider   MULTIVITAMINS (MULTIVITAMIN PO) Take  by mouth daily. Yes Historical Provider       Review of Systems:    14 systems were reviewed. The results are as above in the HPI and otherwise negative. Objective:     Vitals:    01/13/17 1417   BP: 130/86   Pulse: 76   Resp: 16   Temp: 97.6 °F (36.4 °C)   TempSrc: Oral   Weight: 71.7 kg (158 lb)   Height: 5' 2\" (1.575 m)       Physical Exam:  GENERAL: alert, cooperative, no distress, appears stated age,   EYE: conjunctivae/corneas clear. PERRL, EOM's intact. Fundi benign,  THROAT & NECK: normal and no erythema or exudates noted. ,    LYMPHATIC: Cervical, supraclavicular, and axillary nodes normal. ,   LUNG: clear to auscultation bilaterally,   HEART: regular rate and rhythm, S1, S2 normal, no murmur, click, rub or gallop,   ABDOMEN: soft, nondistended, tender at the reducible epigastric hernia which measures approximately 5 cm in diameter. . Bowel sounds normal. No masses,  no organomegaly,   EXTREMITIES:  extremities normal, atraumatic, no cyanosis or edema,   SKIN: Normal.,   NEUROLOGIC: AOx3. Gait normal. Reflexes and motor strength normal and symmetric. Cranial nerves 2-12 and sensation grossly intact. ,     Data Review: To be done    Impression:     · Patient with extensive past medical history with peripheral increased risk of cardiac event or stroke during surgery including hypertension, hypercholesterolemia, coronary artery disease. She has a symptomatic epigastric incisional hernia.     Plan:     · Robot-assisted laparoscopic incisional hernia repair with mesh  · Consent on chart  · Preoperative orders written    Signed By: Valentina Desir MD     January 13, 2017

## 2017-01-19 NOTE — DISCHARGE INSTRUCTIONS
Pt may shower. Allow soap and water to run over the incision. No driving or operating heavy machinery while on narcotic pain medications. No strenuous activity or contact sports for two weeks. No lifting greater than 15 lbs for 2 weeks. Call MD for any redness, swelling, bleeding or pus at the incision. Also call for any nausea, vomiting, increased pain or pain uncontrolled by pain medicine. Abdominal Hernia Repair: What to Expect at Home  Your Recovery  After surgery to repair your hernia, you are likely to have pain for a few days. You may also feel like you have the flu, and you may have a low fever and feel tired and nauseated. This is common. You should feel better after a few days and will probably feel much better in 7 days. For several weeks you may feel twinges or pulling in the hernia repair when you move. You may have some bruising around the area of your hernia repair. This is normal.  This care sheet gives you a general idea about how long it will take for you to recover. But each person recovers at a different pace. Follow the steps below to get better as quickly as possible. How can you care for yourself at home? Activity  · Rest when you feel tired. Getting enough sleep will help you recover. · Try to walk each day. Start by walking a little more than you did the day before. Bit by bit, increase the amount you walk. Walking boosts blood flow and helps prevent pneumonia and constipation. · If your doctor gives you an abdominal binder to wear, use it as directed. This is an elastic bandage that wraps around your belly and upper hips. It helps support your belly muscles after surgery. · Avoid strenuous activities, such as biking, jogging, weight lifting, or aerobic exercise, until your doctor says it is okay. · Avoid lifting anything that would make you strain.  This may include heavy grocery bags and milk containers, a heavy briefcase or backpack, cat litter or dog food bags, a vacuum , or a child. · Ask your doctor when you can drive again. · Most people are able to return to work within 1 to 2 weeks after surgery. But if your job requires that you to do heavy lifting or strenuous activity, you may need to take 4 to 6 weeks off from work. · You may shower 24 to 48 hours after surgery, if your doctor okays it. Pat the cut (incision) dry. Do not take a bath for the first 2 weeks, or until your doctor tells you it is okay. · Ask your doctor when it is okay for you to have sex. Diet  · You can eat your normal diet. If your stomach is upset, try bland, low-fat foods like plain rice, broiled chicken, toast, and yogurt. · Drink plenty of fluids (unless your doctor tells you not to). · You may notice that your bowel movements are not regular right after your surgery. This is common. Avoid constipation and straining with bowel movements. You may want to take a fiber supplement every day. If you have not had a bowel movement after a couple of days, ask your doctor about taking a mild laxative. Medicines  · Your doctor will tell you if and when you can restart your medicines. He or she will also give you instructions about taking any new medicines. · If you take blood thinners, such as warfarin (Coumadin), clopidogrel (Plavix), or aspirin, be sure to talk to your doctor. He or she will tell you if and when to start taking those medicines again. Make sure that you understand exactly what your doctor wants you to do. · Be safe with medicines. Take pain medicines exactly as directed. ¨ If the doctor gave you a prescription medicine for pain, take it as prescribed. ¨ If you are not taking a prescription pain medicine, ask your doctor if you can take an over-the-counter medicine. · If your doctor prescribed antibiotics, take them as directed. Do not stop taking them just because you feel better. You need to take the full course of antibiotics.   · If you think your pain medicine is making you sick to your stomach:  ¨ Take your medicine after meals (unless your doctor has told you not to). ¨ Ask your doctor for a different pain medicine. Incision care  · If you have strips of tape on the cut (incision) the doctor made, leave the tape on for a week or until it falls off. Or follow your doctor's instructions for removing the tape. · If you have staples closing the cut, you will need to visit your doctor in 1 to 2 weeks to have them removed. · Wash the area daily with warm, soapy water, and pat it dry. Don't use hydrogen peroxide or alcohol, which can slow healing. You may cover the area with a gauze bandage if it weeps or rubs against clothing. Change the bandage every day. Other instructions  · Hold a pillow over your incision when you cough or take deep breaths. This will support your belly and decrease your pain. · Do breathing exercises at home as instructed by your doctor. This will help prevent pneumonia. · If you had laparoscopic surgery, you may also have pain in your left shoulder. The pain usually lasts about a day or two. Follow-up care is a key part of your treatment and safety. Be sure to make and go to all appointments, and call your doctor if you are having problems. It's also a good idea to know your test results and keep a list of the medicines you take. When should you call for help? Call 911 anytime you think you may need emergency care. For example, call if:  · You passed out (lost consciousness). · You have sudden chest pain and shortness of breath, or you cough up blood. · You have severe pain in your belly. Call your doctor now or seek immediate medical care if:  · You are sick to your stomach and cannot keep fluids down. · You have signs of a blood clot, such as:  ¨ Pain in your calf, back of the knee, thigh, or groin. ¨ Redness and swelling in your leg or groin. · You have signs of infection, such as:  ¨ Increased pain, swelling, warmth, or redness.   ¨ Red streaks leading from the incision. ¨ Pus draining from the incision. ¨ A fever. · You have trouble passing urine or stool, especially if you have mild pain or swelling in your lower belly. · Bright red blood has soaked through the bandage over your incision. Watch closely for changes in your health, and be sure to contact your doctor if:  · Your swelling is getting worse. · Your swelling is not going down. · You still don't have a bowel movement after taking a laxative. Where can you learn more? Go to http://obinna-luda.info/. Enter B577 in the search box to learn more about \"Abdominal Hernia Repair: What to Expect at Home. \"  Current as of: August 9, 2016  Content Version: 11.1  © 9992-2723 [employer]. Care instructions adapted under license by [employer] (which disclaims liability or warranty for this information). If you have questions about a medical condition or this instruction, always ask your healthcare professional. [place] [address] any warranty or liability for your use of this information. DISCHARGE SUMMARY from Nurse    The following personal items are in your possession at time of discharge:    Dental Appliances: None  Visual Aid: Glasses     Home Medications: None  Jewelry: None  Clothing: Jacket/Coat, Pants, Footwear, Socks, Shirt, Undergarments (With friend Castroharoon)  Other Valuables: Cell Phone (With friend Costaon)     PATIENT INSTRUCTIONS:    After general anesthesia or intravenous sedation, for 24 hours or while taking prescription Narcotics:  · Limit your activities  · Do not drive and operate hazardous machinery  · Do not make important personal or business decisions  · Do  not drink alcoholic beverages  · If you have not urinated within 8 hours after discharge, please contact your surgeon on call.     Report the following to your surgeon:  · Excessive pain, swelling, redness or odor of or around the surgical area  · Temperature over 100.5  · Nausea and vomiting lasting longer than 4 hours or if unable to take medications  · Any signs of decreased circulation or nerve impairment to extremity: change in color, persistent  numbness, tingling, coldness or increase pain  · Any questions    *  Please give a list of your current medications to your Primary Care Provider. *  Please update this list whenever your medications are discontinued, doses are      changed, or new medications (including over-the-counter products) are added. *  Please carry medication information at all times in case of emergency situations. These are general instructions for a healthy lifestyle:    No smoking/ No tobacco products/ Avoid exposure to second hand smoke    Surgeon General's Warning:  Quitting smoking now greatly reduces serious risk to your health. Obesity, smoking, and sedentary lifestyle greatly increases your risk for illness    A healthy diet, regular physical exercise & weight monitoring are important for maintaining a healthy lifestyle    You may be retaining fluid if you have a history of heart failure or if you experience any of the following symptoms:  Weight gain of 3 pounds or more overnight or 5 pounds in a week, increased swelling in our hands or feet or shortness of breath while lying flat in bed. Please call your doctor as soon as you notice any of these symptoms; do not wait until your next office visit. Recognize signs and symptoms of STROKE:    F-face looks uneven    A-arms unable to move or move unevenly    S-speech slurred or non-existent    T-time-call 911 as soon as signs and symptoms begin-DO NOT go       Back to bed or wait to see if you get better-TIME IS BRAIN. Warning Signs of HEART ATTACK     Call 911 if you have these symptoms:   Chest discomfort. Most heart attacks involve discomfort in the center of the chest that lasts more than a few minutes, or that goes away and comes back.  It can feel like uncomfortable pressure, squeezing, fullness, or pain.  Discomfort in other areas of the upper body. Symptoms can include pain or discomfort in one or both arms, the back, neck, jaw, or stomach.  Shortness of breath with or without chest discomfort.  Other signs may include breaking out in a cold sweat, nausea, or lightheadedness. Don't wait more than five minutes to call 911 - MINUTES MATTER! Fast action can save your life. Calling 911 is almost always the fastest way to get lifesaving treatment. Emergency Medical Services staff can begin treatment when they arrive -- up to an hour sooner than if someone gets to the hospital by car. The discharge information has been reviewed with the patient and family. The patient and family verbalized understanding. Discharge medications reviewed with the patient and family and appropriate educational materials and side effects teaching were provided. Hydrocodone/Acetaminophen (By mouth)   Acetaminophen (h-eyws-q-MIN-oh-fen), Hydrocodone Bitartrate (ydt-akij-HXW-done bye-TAR-trate)  Treats pain. This medicine contains a narcotic pain reliever. Brand Name(s):Hycet, Lorcet, Lorcet HD, Lorcet Plus, Lortab 10/325, Lortab 5/325, Lortab 7.5/325, Lortab Elixir, Norco, Verdrocet, Vicodin, Vicodin ES, Vicodin HP, Xodol, Xodol 5/300   There may be other brand names for this medicine. When This Medicine Should Not Be Used: This medicine is not right for everyone. Do not use it if you had an allergic reaction to acetaminophen, hydrocodone, or other narcotic medicines. How to Use This Medicine:   Tablet, Liquid, Capsule  · Your doctor will tell you how much medicine to use. Do not use more than directed. · Measure the oral liquid medicine with a marked measuring spoon, oral syringe, or medicine cup. · Drink plenty of liquids to help avoid constipation. · Missed dose: Take a dose as soon as you remember.  If it is almost time for your next dose, wait until then and take a regular dose. Do not take extra medicine to make up for a missed dose. · Store the medicine in a closed container at room temperature, away from heat, moisture, and direct light. Drugs and Foods to Avoid:   Ask your doctor or pharmacist before using any other medicine, including over-the-counter medicines, vitamins, and herbal products. · Some medicines can affect how hydrocodone/acetaminophen works. Tell your doctor if you are using any of the following:   ¨ An MAO inhibitor  ¨ Medicine to treat depression  · This medicine can intensify the effects of alcohol, sedatives, or tranquilizers. Tell your doctor if you drink alcohol or if you are using any medicine that makes you sleepy, such as allergy medicine or another narcotic pain medicine. Acetaminophen can damage your liver, and your risk is higher if you also drink alcohol. Warnings While Using This Medicine:   · Tell your doctor if you are pregnant or breastfeeding, or if you have lung disease, liver disease, kidney disease, problems urinating, an underactive thyroid, [place] disease, prostate problems, stomach problems, or a history of head injury or brain tumor. · This medicine may cause the following problems:   ¨ Liver problems  ¨ Serious skin reactions  · This medicine can be habit-forming. Do not use more than your prescribed dose. Call your doctor if you think your medicine is not working. · This medicine may make you dizzy or drowsy. Do not drive or doing anything else that could be dangerous until you know how this medicine affects you. · Too much of this medicine can cause death. Symptoms of an overdose include extreme dizziness or weakness, trouble breathing, slow heartbeat, seizure, and cold, clammy skin. · This medicine contains acetaminophen.  Read the labels of all other medicines you are using to see if they also contain acetaminophen, or ask your doctor or pharmacist. Jose Romeroquintin not use more than 4 grams (4,000 milligrams) total of acetaminophen in one day. · Tell any doctor or dentist who treats you that you are using this medicine. This medicine may affect certain medical test results. · This medicine may cause constipation, especially with long-term use. Ask your doctor if you should use a laxative to prevent and treat constipation. · Keep all medicine out of the reach of children. Never share your medicine with anyone. Possible Side Effects While Using This Medicine:   Call your doctor right away if you notice any of these side effects:  · Allergic reaction: Itching or hives, swelling in your face or hands, swelling or tingling in your mouth or throat, chest tightness, trouble breathing  · Blistering, peeling, red skin rash  · Change in how much or how often you urinate  · Dark urine or pale stools, loss of appetite, stomach pain, yellow skin or eyes  · Extreme weakness, shallow breathing, slow heartbeat, sweating, cold or clammy skin  · Lightheadedness, dizziness, fainting  · Unusual bleeding or bruising  If you notice these less serious side effects, talk with your doctor:   · Constipation, nausea, vomiting  · Tiredness or sleepiness  If you notice other side effects that you think are caused by this medicine, tell your doctor. Call your doctor for medical advice about side effects. You may report side effects to FDA at 4-323-FDA-7119  © 2016 [address] is for End User's use only and may not be sold, redistributed or otherwise used for commercial purposes. The above information is an  only. It is not intended as medical advice for individual conditions or treatments. Talk to your doctor, nurse or pharmacist before following any medical regimen to see if it is safe and effective for you. Laxative, Stool Softeners (By mouth)   Treats constipation by helping you have a bowel movement.    Brand Name(s):Col-Rite, Colace, Colace Clear, DSS, Diocto, Diocto Liquid, Doc-Q-Lace, Docu Liquid, Docu-Soft, Docucal, Doculax, Docuprene, Docusil, Dok, Dulcolax   There may be other brand names for this medicine. When This Medicine Should Not Be Used: You should not use this medicine if you have severe stomach pain, nausea, or vomiting. Stool softeners should not be used if you have severe stomach pain and do not know the cause. How to Use This Medicine:   Capsule, Tablet, Liquid, Liquid Filled Capsule  · Your doctor will tell you how much medicine to use. Do not use more than directed. · Follow the instructions on the medicine label if you are using this medicine without a prescription. · Drink 6 to 8 glasses of water daily while using any laxative. · To make the oral liquid taste better, you may mix it with one-half glass of milk or fruit juice. · Measure the oral liquid medicine with a marked measuring spoon, oral syringe, medicine cup, or medicine dropper. If a dose is missed:   · Use the missed dose as soon as possible. · If you do not remember the missed dose until the next day, skip the missed dose and go back to your regular dosing schedule. · You should not use two doses at the same time. How to Store and Dispose of This Medicine:   · Store the medicine in a tightly closed container at room temperature, away from heat and moisture. Do not store liquid-filled capsules in the refrigerator. · Keep all medicine out of the reach of children. Drugs and Foods to Avoid:   Ask your doctor or pharmacist before using any other medicine, including over-the-counter medicines, vitamins, and herbal products. · You should not use mineral oil while you are using a stool softener. · You should not use a stool softener within 2 hours before or after taking any other medicines. Laxatives can keep other medicines from working correctly. Warnings While Using This Medicine:   · If you are pregnant or breastfeeding, talk to your doctor before taking this medicine.   · Do not give laxatives to children under 6 years old unless you talk to your doctor. · You should not use this laxative for longer than 1 week unless approved by your doctor. Laxatives may be habit-forming and can harm your bowels if you use them too long. · Stool softeners usually work in 1 to 2 days, but for some people, results can take as long as 3 to 5 days. Possible Side Effects While Using This Medicine: If you notice these less serious side effects, talk with your doctor:  · Nausea  · Sore throat  · Skin rash  If you notice other side effects that you think are caused by this medicine, tell your doctor. Call your doctor for medical advice about side effects. You may report side effects to FDA at 5-214-FDA-6439  © 2016 [address] is for End User's use only and may not be sold, redistributed or otherwise used for commercial purposes. The above information is an  only. It is not intended as medical advice for individual conditions or treatments. Talk to your doctor, nurse or pharmacist before following any medical regimen to see if it is safe and effective for you.

## 2017-01-19 NOTE — ANESTHESIA PREPROCEDURE EVALUATION
Anesthetic History   No history of anesthetic complications            Review of Systems / Medical History  Patient summary reviewed and pertinent labs reviewed    Pulmonary  Within defined limits                 Neuro/Psych              Cardiovascular    Hypertension: well controlled          CAD    Exercise tolerance: <4 METS     GI/Hepatic/Renal  Within defined limits              Endo/Other  Within defined limits           Other Findings   Comments: Current Smoker? YES       Elective Surgery? Yes       Abstained from smoking 24 hours prior to anesthesia? NO    Risk Factors for Postoperative nausea/vomiting:       History of postoperative nausea/vomiting? NO       Female? YES       Motion sickness? NO       Intended opioid administration for postoperative analgesia?   NO           Physical Exam    Airway  Mallampati: III  TM Distance: 4 - 6 cm  Neck ROM: normal range of motion   Mouth opening: Normal     Cardiovascular  Regular rate and rhythm,  S1 and S2 normal,  no murmur, click, rub, or gallop             Dental    Dentition: Poor dentition     Pulmonary  Breath sounds clear to auscultation               Abdominal  GI exam deferred       Other Findings            Anesthetic Plan    ASA: 3  Anesthesia type: general          Induction: Intravenous  Anesthetic plan and risks discussed with: Patient and Family

## 2017-01-30 ENCOUNTER — TELEPHONE (OUTPATIENT)
Dept: SURGERY | Age: 77
End: 2017-01-30

## 2017-01-30 NOTE — TELEPHONE ENCOUNTER
Patient left message she was still having pain. I returned the call. Got VM. Told her she could come in tomorrow instead of Wednesday, to call us back to arrange appt.  In am.

## 2017-02-01 ENCOUNTER — OFFICE VISIT (OUTPATIENT)
Dept: SURGERY | Age: 77
End: 2017-02-01

## 2017-02-01 VITALS
SYSTOLIC BLOOD PRESSURE: 128 MMHG | TEMPERATURE: 97.4 F | RESPIRATION RATE: 18 BRPM | HEART RATE: 78 BPM | BODY MASS INDEX: 28.52 KG/M2 | HEIGHT: 62 IN | DIASTOLIC BLOOD PRESSURE: 82 MMHG | WEIGHT: 155 LBS | OXYGEN SATURATION: 99 %

## 2017-02-01 DIAGNOSIS — Z09 POSTOPERATIVE EXAMINATION: Primary | ICD-10-CM

## 2017-02-01 DIAGNOSIS — S30.1XXA HEMATOMA OF ABDOMINAL WALL, INITIAL ENCOUNTER: ICD-10-CM

## 2017-02-01 RX ORDER — OXYCODONE AND ACETAMINOPHEN 5; 325 MG/1; MG/1
1-2 TABLET ORAL
Qty: 40 TAB | Refills: 0 | Status: SHIPPED | OUTPATIENT
Start: 2017-02-01 | End: 2017-02-10 | Stop reason: SDUPTHER

## 2017-02-01 NOTE — PROGRESS NOTES
Patient here to follow up on a hernia surgery. She is allergic to Norco.  Added to her Allergies list.  Pain today a 7. Complaining of swelling in abdomen. Needs another pain medication.   PGC,LPN, Cn, BC

## 2017-02-01 NOTE — PROGRESS NOTES
Richmond Blanchard M.D. FACS  PROGRESS NOTE    Name: Henna Watson MRN: [id number]   : 1940 [facility]   Date: 2017 Admission Date: No admission date for patient encounter. Subjective:  Patient presents today with complaints of inability to tolerate the pain medicines which were prescribed postoperatively. She states that she had swelling and difficult swallowing with the Norco.  Therefore she was unable to take adequate pain relief. She has some swelling in the incision in the center of the lower midline. She denies any nausea vomiting diarrhea constipation. Objective:  Vitals:    17 1150   BP: 128/82   Pulse: 78   Resp: 18   Temp: 97.4 °F (36.3 °C)   TempSrc: Oral   SpO2: 99%   Weight: 70.3 kg (155 lb)   Height: 5' 2\" (1.575 m)       Physical Exam:    General: in no apparent distress    Abdomen: abdomen is soft with incisional tenderness. Incision(s) are C/D/I.  2 x 2 centimeter hematoma underlying the midline lower incision. No evidence of hernia recurrence in the epigastric region with cough. No masses, organomegaly or guarding    Labs:  No results found for this or any previous visit (from the past 24 hour(s)). Current Medications:  Current Outpatient Prescriptions   Medication Sig Dispense Refill    oxyCODONE-acetaminophen (PERCOCET) 5-325 mg per tablet Take 1-2 Tabs by mouth every four (4) hours as needed for Pain. Max Daily Amount: 12 Tabs. 40 Tab 0    docusate sodium (COLACE) 100 mg capsule Take 1 Cap by mouth two (2) times a day for 30 days. 60 Cap 0    cholecalciferol, vitamin D3, (VITAMIN D3) 2,000 unit tab Take  by mouth daily.  CALCIUM PO Take 1,200 mg by mouth daily.  triamterene-hydroCHLOROthiazide (MAXZIDE) 75-50 mg per tablet Take 0.5 Tabs by mouth daily.  metoprolol succinate (TOPROL-XL) 25 mg XL tablet Take 25 mg by mouth daily.  aspirin delayed-release 81 mg tablet Take 81 mg by mouth daily.       clonazePAM (KLONOPIN) 0.5 mg tablet 0.5 mg daily.  MULTIVITAMINS (MULTIVITAMIN PO) Take  by mouth daily. Chart and notes reviewed. Data reviewed. I have evaluated and examined the patient.         IMPRESSION:   · Patient doing well 2 weeks out from robot-assisted laparoscopic incisional hernia repair with mesh       PLAN:/DISCUSION:   · Percocet 5/325 1-2 tablets every 4-6 hours as needed for pain  · Continue lifting restrictions  · Follow-up in 2 weeks         Mo Chiang MD

## 2017-02-08 ENCOUNTER — TELEPHONE (OUTPATIENT)
Dept: SURGERY | Age: 77
End: 2017-02-08

## 2017-02-08 NOTE — TELEPHONE ENCOUNTER
Patient's sister called to state patient was having pain at a 6 to 7. She was given an appointment for Friday at 8:30 am. Advised if pain worsens, to call us back or go to the ER. She understood.   MARISA,LPN, CN, BC

## 2017-02-10 ENCOUNTER — OFFICE VISIT (OUTPATIENT)
Dept: SURGERY | Age: 77
End: 2017-02-10

## 2017-02-10 VITALS
WEIGHT: 156 LBS | HEIGHT: 62 IN | DIASTOLIC BLOOD PRESSURE: 86 MMHG | SYSTOLIC BLOOD PRESSURE: 140 MMHG | HEART RATE: 69 BPM | BODY MASS INDEX: 28.71 KG/M2 | RESPIRATION RATE: 16 BRPM | TEMPERATURE: 97.6 F

## 2017-02-10 DIAGNOSIS — Z09 POSTOPERATIVE EXAMINATION: Primary | ICD-10-CM

## 2017-02-10 RX ORDER — MELOXICAM 7.5 MG/1
15 TABLET ORAL DAILY
Qty: 30 TAB | Refills: 0 | Status: SHIPPED | OUTPATIENT
Start: 2017-02-10 | End: 2020-08-28

## 2017-02-10 RX ORDER — OXYCODONE AND ACETAMINOPHEN 5; 325 MG/1; MG/1
1-2 TABLET ORAL
Qty: 40 TAB | Refills: 0 | Status: SHIPPED | OUTPATIENT
Start: 2017-02-10 | End: 2019-02-25

## 2017-02-10 NOTE — PROGRESS NOTES
Richmond De Jesus M.D. FACS  PROGRESS NOTE    Name: Seda Briggs MRN: [id number]   : 1940 [facility]   Date: 2/10/2017 Admission Date: No admission date for patient encounter. Subjective:  Patient returns today with complaints of left upper quadrant pain of the rib cage. She states that the pain is intermittent. The pain is present more that is not present. She rests to make the pain better. Movement makes the pain worse. The pain is described as sharp. Prior to the pain getting worse to 3 days ago she was sneezing a lot in the morning which is possibly secondary to allergies. The pain does make her nauseous. She spent the last 2 days lying down. The patient also suffers from constipation. She states that she has to work at it to get the bowel movements out. She last took Correctol 3 days ago had one large bowel movement. She states that she drinks quite a bit of water every day. She only drinks half a cup of coffee per day. She denies any alcohol or sodas. Patient states that she is only been taking 1 of the Percocet instead of 2. Objective:  Vitals:    02/10/17 0849   BP: 140/86   Pulse: 69   Resp: 16   Temp: 97.6 °F (36.4 °C)   TempSrc: Oral   Weight: 70.8 kg (156 lb)   Height: 5' 2\" (1.575 m)       Physical Exam:    General: in no apparent distress    Abdomen: abdomen is soft with left upper quadrant tenderness without rebound or guarding. Incision(s) are C/D/I. No   masses, organomegaly or guarding. No evidence of hernia recurrence. Labs:  No results found for this or any previous visit (from the past 24 hour(s)). Current Medications:  Current Outpatient Prescriptions   Medication Sig Dispense Refill    oxyCODONE-acetaminophen (PERCOCET) 5-325 mg per tablet Take 1-2 Tabs by mouth every four (4) hours as needed for Pain. Max Daily Amount: 12 Tabs. 40 Tab 0    meloxicam (MOBIC) 7.5 mg tablet Take 2 Tabs by mouth daily.  30 Tab 0    docusate sodium (COLACE) 100 mg capsule Take 1 Cap by mouth two (2) times a day for 30 days. 60 Cap 0    cholecalciferol, vitamin D3, (VITAMIN D3) 2,000 unit tab Take  by mouth daily.  CALCIUM PO Take 1,200 mg by mouth daily.  triamterene-hydroCHLOROthiazide (MAXZIDE) 75-50 mg per tablet Take 0.5 Tabs by mouth daily.  metoprolol succinate (TOPROL-XL) 25 mg XL tablet Take 25 mg by mouth daily.  aspirin delayed-release 81 mg tablet Take 81 mg by mouth daily.  clonazePAM (KLONOPIN) 0.5 mg tablet 0.5 mg daily.  MULTIVITAMINS (MULTIVITAMIN PO) Take  by mouth daily. Chart and notes reviewed. Data reviewed. I have evaluated and examined the patient. IMPRESSION:   · Patient with left upper quadrant abdominal pain following repair of epigastric hernia with mesh. The pain is most likely secondary to fixation of the mass against the anterior abdominal wall. PLAN:/DISCUSION:   · Ice pack to the left upper quadrant of the abdomen 3 times a day for 30 minutes each time. · Mobic 7.5 mg p.o. daily  · Percocet 5/325 1 to 2 tablets every 4-6 hours as needed for pain. · Follow-up in 2 weeks.           Leonidas Steve MD

## 2017-02-10 NOTE — PATIENT INSTRUCTIONS
If you have any question or concerns about today's appointment, the verbal and/or written instructions you were given for follow up care, please call our office at [phone].      [facility], [address]  [place], [address]

## 2017-02-10 NOTE — PROGRESS NOTES
Patient is a 67year old female presenting for left sided abdominal pain. Patient reports she has left sided pain after having incisional hernia repair.

## 2017-02-22 ENCOUNTER — OFFICE VISIT (OUTPATIENT)
Dept: SURGERY | Age: 77
End: 2017-02-22

## 2017-02-22 VITALS
HEART RATE: 69 BPM | TEMPERATURE: 97.4 F | SYSTOLIC BLOOD PRESSURE: 110 MMHG | BODY MASS INDEX: 28.34 KG/M2 | RESPIRATION RATE: 18 BRPM | WEIGHT: 154 LBS | DIASTOLIC BLOOD PRESSURE: 70 MMHG | HEIGHT: 62 IN

## 2017-02-22 DIAGNOSIS — Z09 POSTOPERATIVE EXAMINATION: Primary | ICD-10-CM

## 2017-02-22 NOTE — PROGRESS NOTES
1. Have you been to the ER, urgent care clinic since your last visit? Hospitalized since your last visit? No    2. Have you seen or consulted any other health care providers outside of the [address] [place] [address] since your last visit? Include any pap smears or colon screening.  No

## 2017-02-24 NOTE — PROGRESS NOTES
Richmond Baxter M.D. FACS  PROGRESS NOTE    Name: Shashank Dumont MRN: [id number]   : 1940 [facility]   Date: 2017 Admission Date: No admission date for patient encounter. Subjective:  Patient returns today with complaints of soreness in the left upper quadrant. She has a cough and the soreness is worse with the cough. She is eating without difficulty. She has no constipation. Objective:  Vitals:    17 1143   BP: 110/70   Pulse: 69   Resp: 18   Temp: 97.4 °F (36.3 °C)   Weight: 69.9 kg (154 lb)   Height: 5' 2\" (1.575 m)       Physical Exam:    General: Awake and alert, oriented ×4, no apparent distress   Abdomen: abdomen is soft with left flank tenderness near the incision. No crepitance or  fluctuance or erythema is present. No evidence of hernia recurrence or cough or Valsalva. Incision(s) are C/D/I. No masses, organomegaly or guarding    Labs:  No results found for this or any previous visit (from the past 24 hour(s)). Current Medications:  Current Outpatient Prescriptions   Medication Sig Dispense Refill    oxyCODONE-acetaminophen (PERCOCET) 5-325 mg per tablet Take 1-2 Tabs by mouth every four (4) hours as needed for Pain. Max Daily Amount: 12 Tabs. 40 Tab 0    meloxicam (MOBIC) 7.5 mg tablet Take 2 Tabs by mouth daily. 30 Tab 0    cholecalciferol, vitamin D3, (VITAMIN D3) 2,000 unit tab Take  by mouth daily.  CALCIUM PO Take 1,200 mg by mouth daily.  triamterene-hydroCHLOROthiazide (MAXZIDE) 75-50 mg per tablet Take 0.5 Tabs by mouth daily.  metoprolol succinate (TOPROL-XL) 25 mg XL tablet Take 25 mg by mouth daily.  aspirin delayed-release 81 mg tablet Take 81 mg by mouth daily.  clonazePAM (KLONOPIN) 0.5 mg tablet 0.5 mg daily.  MULTIVITAMINS (MULTIVITAMIN PO) Take  by mouth daily. Chart and notes reviewed. Data reviewed. I have evaluated and examined the patient.         IMPRESSION:   · Patient with musculoskeletal soreness after robot-assisted laparoscopic incisional hernia repair with mesh. PLAN:/DISCUSION:   · Ibuprofen and ice to left upper quadrant  · Continue to restrict lifting to less than 25 pounds. No strenuous activity.   · Follow-up in 2 weeks         Sherryle Freund, MD

## 2017-03-07 ENCOUNTER — OFFICE VISIT (OUTPATIENT)
Dept: SURGERY | Age: 77
End: 2017-03-07

## 2017-03-07 VITALS
DIASTOLIC BLOOD PRESSURE: 68 MMHG | BODY MASS INDEX: 27.97 KG/M2 | TEMPERATURE: 97.2 F | OXYGEN SATURATION: 98 % | HEART RATE: 75 BPM | HEIGHT: 62 IN | WEIGHT: 152 LBS | SYSTOLIC BLOOD PRESSURE: 122 MMHG | RESPIRATION RATE: 16 BRPM

## 2017-03-07 DIAGNOSIS — Z09 POSTOPERATIVE EXAMINATION: Primary | ICD-10-CM

## 2017-03-07 RX ORDER — BISACODYL 5 MG
TABLET, DELAYED RELEASE (ENTERIC COATED) ORAL
Qty: 4 TAB | Refills: 0 | Status: SHIPPED | OUTPATIENT
Start: 2017-03-07 | End: 2018-10-18 | Stop reason: ALTCHOICE

## 2017-03-07 NOTE — PROGRESS NOTES
Richmond Singleton M.D. FACS  PROGRESS NOTE    Name: Case Benitez MRN: [id number]   : 1940 [facility]   Date: 3/7/2017 Admission Date: No admission date for patient encounter. Subjective: Patient without complaints. Objective:  Vitals:    17 1133   BP: 122/68   Pulse: 75   Resp: 16   Temp: 97.2 °F (36.2 °C)   TempSrc: Oral   SpO2: 98%   Weight: 68.9 kg (152 lb)   Height: 5' 2\" (1.575 m)       Physical Exam:    General: Awake and alert, oriented ×4, no apparent distress   Abdomen: abdomen is soft without tenderness. No evidence of hernia recurrence with  Valsalva or cough incision(s) are C/D/I. No masses, organomegaly or guarding    Labs:  No results found for this or any previous visit (from the past 24 hour(s)). Current Medications:  Current Outpatient Prescriptions   Medication Sig Dispense Refill    bisacodyl (DULCOLAX) 5 mg EC tablet Take 4 tablets with 20 ounces of water 4 Tab 0    cholecalciferol, vitamin D3, (VITAMIN D3) 2,000 unit tab Take  by mouth daily.  CALCIUM PO Take 1,200 mg by mouth daily.  triamterene-hydroCHLOROthiazide (MAXZIDE) 75-50 mg per tablet Take 0.5 Tabs by mouth daily.  metoprolol succinate (TOPROL-XL) 25 mg XL tablet Take 25 mg by mouth daily.  aspirin delayed-release 81 mg tablet Take 81 mg by mouth daily.  clonazePAM (KLONOPIN) 0.5 mg tablet 0.5 mg daily.  MULTIVITAMINS (MULTIVITAMIN PO) Take  by mouth daily.  oxyCODONE-acetaminophen (PERCOCET) 5-325 mg per tablet Take 1-2 Tabs by mouth every four (4) hours as needed for Pain. Max Daily Amount: 12 Tabs. 40 Tab 0    meloxicam (MOBIC) 7.5 mg tablet Take 2 Tabs by mouth daily. 30 Tab 0       Chart and notes reviewed. Data reviewed. I have evaluated and examined the patient. IMPRESSION:   · Patient doing well.         PLAN:/DISCUSION:   · Follow-up as needed         Donna Gonzalez MD

## 2017-06-30 ENCOUNTER — OFFICE VISIT (OUTPATIENT)
Dept: CARDIOLOGY CLINIC | Age: 77
End: 2017-06-30

## 2017-06-30 VITALS
OXYGEN SATURATION: 94 % | HEART RATE: 72 BPM | HEIGHT: 62 IN | SYSTOLIC BLOOD PRESSURE: 124 MMHG | WEIGHT: 152 LBS | BODY MASS INDEX: 27.97 KG/M2 | DIASTOLIC BLOOD PRESSURE: 82 MMHG

## 2017-06-30 DIAGNOSIS — R00.2 PALPITATION: ICD-10-CM

## 2017-06-30 DIAGNOSIS — I25.10 CORONARY ARTERY DISEASE INVOLVING NATIVE CORONARY ARTERY OF NATIVE HEART WITHOUT ANGINA PECTORIS: Primary | ICD-10-CM

## 2017-06-30 RX ORDER — METOPROLOL SUCCINATE 50 MG/1
50 TABLET, EXTENDED RELEASE ORAL DAILY
Qty: 90 TAB | Refills: 3 | Status: SHIPPED | OUTPATIENT
Start: 2017-06-30 | End: 2017-07-28 | Stop reason: SDUPTHER

## 2017-06-30 NOTE — PROGRESS NOTES
PATIENT NAME: Rocio Lo         67 y.o.      1940              DATE:6/30/2017    REASON FOR VISIT: Coronary artery disease    History of present illness: The patient is status post coronary artery bypass surgery. Denies chest pain. .  Experiencing rapid palpitations intermittently. These are associated with some shortness of breath. No syncope or presyncope. Extremely fatigued. PAST MEDICAL HISTORY:   Past Medical History:  3/26/2014: Abnormal finding on EKG      Comment: s/o inf wall mi asymptomatic   No date: Anxiety  10/21/2010: Ataxic gait  05/2014: Breast cancer ([facility] [address]      Comment: left side  11/29/2016: Cardiac nuclear imaging test      Comment: Low risk. Likely inferior basal prior injury. No ischemia. EF 65%. Mild inferobasal hypk. Neg EKG on pharm stress test.  No date: Diverticulosis  No date: FHx: breast cancer  No date: Herpes  No date: Hypercholesterolemia  No date: Hypertension  No date:  Insomnia  No date: Osteopenia  2008: Osteoporosis screening      Comment: WNL per patient, GYN orders  8/2010: Pap smear      Comment: GYN, normal  3/26/2014: Pre-operative cardiovascular examination      Comment: for shoulder surgery   No date: S/P partial mastectomy, left, with sentinel ly*  6/2010: Screening mammogram      Comment: GYN orders    PAST SURGICAL HISTORY:   Past Surgical History:  10/2015: CARDIAC SURG PROCEDURE UNLIST      Comment: triple bypass  09/2008: COLONOSCOPY  1980: HX BACK SURGERY  No date: HX BREAST BIOPSY      Comment: right (2000), left (2011)  10/2009: HX HYSTERECTOMY      Comment: complete, fibroid  6./8/11: HX MASTECTOMY      Comment: partial left with sentinel lymph node biopsy  9/10/2014: HX MASTECTOMY Left      Comment: LEFT PARTIAL MASTECTOMY performed by Marley Le MD at [address] [facility]  No date: HX ORTHOPAEDIC      Comment: left lower leg surgery at age 11  7/2014: HX ORTHOPAEDIC Right      Comment: ROTATOR CUFF  01/19/2017: DC LAP, INCISIONAL HERNIA REPAIR,REDUCIBLE N/A      Comment: Dr. Payton Rodriguez:  Social History    Marital status:              Spouse name:                       Years of education:                 Number of children:               Social History Main Topics    Smoking status: Never Smoker                                                                Smokeless status: Never Used                        Comment: quit age 26    Alcohol use: Yes                Comment: occasional    Drug use: No              Sexual activity: Not Currently            ALLERGIES:    -- Latex -- Unable to Obtain   -- Norco [Hydrocodone-Acetaminophen] -- Swelling    --  Patient experienced throat swelling, hoarseness             and difficulty swallowing.   -- Adhesive -- Hives   -- Codeine -- Nausea Only    --  Pt states she is not allergic. CURRENT MEDICATIONS:   Current Outpatient Prescriptions:  bisacodyl (DULCOLAX) 5 mg EC tablet, Take 4 tablets with 20 ounces of water  oxyCODONE-acetaminophen (PERCOCET) 5-325 mg per tablet, Take 1-2 Tabs by mouth every four (4) hours as needed for Pain. Max Daily Amount: 12 Tabs. meloxicam (MOBIC) 7.5 mg tablet, Take 2 Tabs by mouth daily. cholecalciferol, vitamin D3, (VITAMIN D3) 2,000 unit tab, Take  by mouth daily. CALCIUM PO, Take 1,200 mg by mouth daily. triamterene-hydroCHLOROthiazide (MAXZIDE) 75-50 mg per tablet, Take 0.5 Tabs by mouth daily. metoprolol succinate (TOPROL-XL) 25 mg XL tablet, Take 25 mg by mouth daily. clonazePAM (KLONOPIN) 0.5 mg tablet, 0.5 mg daily. MULTIVITAMINS (MULTIVITAMIN PO), Take  by mouth daily. No current facility-administered medications for this visit.        REVIEW of SYSTEMS:History obtained from the patient  Cardiovascular: Please see history of present illness  General: Weight has been stable     PHYSICAL EXAMINATION:   /82 (BP 1 Location: Left arm, BP Patient Position: Sitting)  Pulse 72  Ht 5' 2\" (1.575 m)  Wt 68.9 kg (152 lb)  SpO2 94%  BMI 27.8 kg/m2  BP Readings from Last 3 Encounters:  06/30/17 : 124/82  03/07/17 : 122/68  02/22/17 : 110/70    Pulse Readings from Last 3 Encounters:  06/30/17 : 72  03/07/17 : 75  02/22/17 : 69    Wt Readings from Last 3 Encounters:  06/30/17 : 68.9 kg (152 lb)  03/07/17 : 68.9 kg (152 lb)  02/22/17 : 69.9 kg (154 lb)    General: Moderately obese white female no apparent distress. Neck: No jugular venous distention. Carotid upstrokes 2+ without bruits. Chest: Clear to auscultation. Heart: Regular rhythm. No murmur or gallop. Extremities: No edema. EKG: Within normal limits    IMPRESSION:   Coronary artery disease. No angina status post coronary artery bypass surgery  Rapid palpitation unknown etiology    PLAN:  The patient would prefer empirically increasing the metoprolol to 50 mg daily rather than carrying a monitor. Will increase metoprolol extended release to 50 mg daily. Return in 1 month    The diagnoses and plan were discussed with patient. All questions answered. Plan of care agreed to by all concerned. Cindy Read.  MD Wilman       ,

## 2017-06-30 NOTE — PROGRESS NOTES
1. Have you been to the ER, urgent care clinic since your last visit? Hospitalized since your last visit? no  2. Have you seen or consulted any other health care providers outside of the [address] [place] [address] since your last visit? Include any pap smears or colon screening.  no

## 2017-07-28 ENCOUNTER — OFFICE VISIT (OUTPATIENT)
Dept: CARDIOLOGY CLINIC | Age: 77
End: 2017-07-28

## 2017-07-28 VITALS
HEART RATE: 66 BPM | WEIGHT: 155 LBS | BODY MASS INDEX: 28.52 KG/M2 | OXYGEN SATURATION: 94 % | SYSTOLIC BLOOD PRESSURE: 130 MMHG | DIASTOLIC BLOOD PRESSURE: 80 MMHG | HEIGHT: 62 IN

## 2017-07-28 DIAGNOSIS — E78.00 HYPERCHOLESTEREMIA: ICD-10-CM

## 2017-07-28 DIAGNOSIS — I25.10 CORONARY ARTERY DISEASE INVOLVING NATIVE CORONARY ARTERY OF NATIVE HEART WITHOUT ANGINA PECTORIS: ICD-10-CM

## 2017-07-28 DIAGNOSIS — R00.2 RAPID PALPITATIONS: Primary | ICD-10-CM

## 2017-07-28 RX ORDER — METOPROLOL SUCCINATE 25 MG/1
25 TABLET, EXTENDED RELEASE ORAL DAILY
Qty: 90 TAB | Refills: 3
Start: 2017-07-28 | End: 2019-06-07

## 2017-07-28 NOTE — PROGRESS NOTES
PATIENT NAME: Hemant Suarez         67 y.o.      1940              DATE:7/28/2017    REASON FOR VISIT: Palpitations    HISTORY OF PRESENT ILLNESS: 2 episodes of rapid palpitations since last visit. Was unable to tolerate the increase in metoprolol. Back on  25 mg daily. . The palpitations are not accompanied by chest discomfort, presyncope, syncope, shortness of breath. Patient denies chest pain and dyspnea on exertion. Denies orthopnea and paroxysmal nocturnal dyspnea. She remains extremely fatigued    PAST MEDICAL HISTORY:   Past Medical History:  3/26/2014: Abnormal finding on EKG      Comment: s/o inf wall mi asymptomatic   No date: Anxiety  10/21/2010: Ataxic gait  05/2014: Breast cancer ([address]      Comment: left side  11/29/2016: Cardiac nuclear imaging test      Comment: Low risk. Likely inferior basal prior injury. No ischemia. EF 65%. Mild inferobasal hypk. Neg EKG on pharm stress test.  No date: Diverticulosis  No date: FHx: breast cancer  No date: Herpes  No date: Hypercholesterolemia  No date: Hypertension  No date:  Insomnia  No date: Osteopenia  2008: Osteoporosis screening      Comment: WNL per patient, GYN orders  8/2010: Pap smear      Comment: GYN, normal  3/26/2014: Pre-operative cardiovascular examination      Comment: for shoulder surgery   No date: S/P partial mastectomy, left, with sentinel ly*  6/2010: Screening mammogram      Comment: GYN orders    PAST SURGICAL HISTORY:   Past Surgical History:  10/2015: CARDIAC SURG PROCEDURE UNLIST      Comment: triple bypass  09/2008: COLONOSCOPY  1980: HX BACK SURGERY  No date: HX BREAST BIOPSY      Comment: right (2000), left (2011)  10/2009: HX HYSTERECTOMY      Comment: complete, fibroid  6./8/11: HX MASTECTOMY      Comment: partial left with sentinel lymph node biopsy  9/10/2014: HX MASTECTOMY Left      Comment: LEFT PARTIAL MASTECTOMY performed by Ginette Blanchard MD at [facility]  No date: HX ORTHOPAEDIC      Comment: left lower leg surgery at age 11  7/2014: HX ORTHOPAEDIC Right      Comment: ROTATOR CUFF  01/19/2017: SC LAP, INCISIONAL HERNIA REPAIR,REDUCIBLE N/A      Comment: Dr. Mason Wallace:  Social History    Marital status:              Spouse name:                       Years of education:                 Number of children:               Social History Main Topics    Smoking status: Never Smoker                                                                Smokeless status: Never Used                        Comment: quit age 26    Alcohol use: Yes                Comment: occasional    Drug use: No              Sexual activity: Not Currently            ALLERGIES:    -- Latex -- Unable to Obtain   -- Norco [Hydrocodone-Acetaminophen] -- Swelling    --  Patient experienced throat swelling, hoarseness             and difficulty swallowing.   -- Adhesive -- Hives   -- Codeine -- Nausea Only    --  Pt states she is not allergic. CURRENT MEDICATIONS:   Current Outpatient Prescriptions:  metoprolol succinate (TOPROL-XL) 25 mg XL tablet, Take 1 Tab by mouth daily. pitavastatin (LIVALO) 1 mg tab tablet, Take 1 Tab by mouth daily. bisacodyl (DULCOLAX) 5 mg EC tablet, Take 4 tablets with 20 ounces of water  oxyCODONE-acetaminophen (PERCOCET) 5-325 mg per tablet, Take 1-2 Tabs by mouth every four (4) hours as needed for Pain. Max Daily Amount: 12 Tabs. meloxicam (MOBIC) 7.5 mg tablet, Take 2 Tabs by mouth daily. cholecalciferol, vitamin D3, (VITAMIN D3) 2,000 unit tab, Take  by mouth daily. CALCIUM PO, Take 1,200 mg by mouth daily. triamterene-hydroCHLOROthiazide (MAXZIDE) 75-50 mg per tablet, Take 0.5 Tabs by mouth daily. clonazePAM (KLONOPIN) 0.5 mg tablet, 0.5 mg daily. MULTIVITAMINS (MULTIVITAMIN PO), Take  by mouth daily. No current facility-administered medications for this visit.        REVIEW of SYSTEMS:History obtained from the patient  General ROS: positive for  - fatigue  negative for - weight gain or weight loss  Cardiovascular ROS: See history of present illness     PHYSICAL EXAMINATION:   /80 (BP 1 Location: Left arm, BP Patient Position: Sitting)  Pulse 66  Ht 5' 2\" (1.575 m)  Wt 70.3 kg (155 lb)  SpO2 94%  BMI 28.35 kg/m2  BP Readings from Last 3 Encounters:  07/28/17 : 130/80  06/30/17 : 124/82  03/07/17 : 122/68    Pulse Readings from Last 3 Encounters:  07/28/17 : 66  06/30/17 : 72  03/07/17 : 75    Wt Readings from Last 3 Encounters:  07/28/17 : 70.3 kg (155 lb)  06/30/17 : 68.9 kg (152 lb)  03/07/17 : 68.9 kg (152 lb)    Neck: No jugular venous distention. Chest: Clear to auscultation. Heart: Regular rhythm. No gallop. Extremities: No edema. EKG: Sinus bradycardia, otherwise within normal limits    IMPRESSION:   Rapid palpitation of unknown etiology  Coronary artery disease, no angina status post coronary artery bypass surgery    PLAN:  Cardiac event recorder  Return to office after the event recorder    The diagnoses and plan were discussed with patient. All questions answered. Plan of care agreed to by all concerned. Mayme Minal.  MD Wilman       ,

## 2017-07-28 NOTE — MR AVS SNAPSHOT
Visit Information Date & Time Provider Department Dept. Phone Encounter #  
 7/28/2017  8:20 AM Esteban Reyes MD [facility] [address] [id number] Upcoming Health Maintenance Date Due DTaP/Tdap/Td series (1 - Tdap) 8/22/1960 ZOSTER VACCINE AGE 60> 6/22/1999 GLAUCOMA SCREENING Q2Y 8/22/2004 Pneumococcal 65+ High/Highest Risk (1 of 2 - PCV13) 8/22/2004 MEDICARE YEARLY EXAM 8/22/2004 INFLUENZA AGE 9 TO ADULT 8/1/2017 Allergies as of 7/28/2017  Review Complete On: 6/30/2017 By: Mamadou Logan Severity Noted Reaction Type Reactions Latex  01/26/2015    Unable to Obtain Norco [Hydrocodone-acetaminophen] High 02/01/2017   Systemic Swelling Patient experienced throat swelling, hoarseness and difficulty swallowing. Adhesive  03/29/2016    Hives Codeine  03/26/2010    Nausea Only Pt states she is not allergic. Current Immunizations  Reviewed on 12/5/2011 Name Date Influenza Vaccine Split 12/5/2011 Not reviewed this visit You Were Diagnosed With   
  
 Codes Comments Coronary artery disease involving native coronary artery of native heart without angina pectoris    -  Primary ICD-10-CM: I25.10 ICD-9-CM: 414.01 Vitals BP Pulse Height(growth percentile) Weight(growth percentile) SpO2 BMI  
 130/80 (BP 1 Location: Left arm, BP Patient Position: Sitting) 66 5' 2\" (1.575 m) 155 lb (70.3 kg) 94% 28.35 kg/m2 OB Status Smoking Status Hysterectomy Never Smoker Vitals History BMI and BSA Data Body Mass Index Body Surface Area  
 28.35 kg/m 2 1.75 m 2 Preferred Pharmacy Pharmacy Name Phone Latricia Mancilla [address],  [facility] [phone] Your Updated Medication List  
  
   
This list is accurate as of: 7/28/17  8:51 AM.  Always use your most recent med list.  
  
  
  
  
 bisacodyl 5 mg EC tablet Commonly known as:  DULCOLAX Take 4 tablets with 20 ounces of water CALCIUM PO Take 1,200 mg by mouth daily. clonazePAM 0.5 mg tablet Commonly known as:  KlonoPIN  
0.5 mg daily. meloxicam 7.5 mg tablet Commonly known as:  MOBIC Take 2 Tabs by mouth daily. metoprolol succinate 25 mg XL tablet Commonly known as:  TOPROL-XL Take 1 Tab by mouth daily. MULTIVITAMIN PO Take  by mouth daily. oxyCODONE-acetaminophen 5-325 mg per tablet Commonly known as:  PERCOCET Take 1-2 Tabs by mouth every four (4) hours as needed for Pain. Max Daily Amount: 12 Tabs. pitavastatin 1 mg Tab tablet Commonly known as:  LIVALO Take 1 Tab by mouth daily. triamterene-hydroCHLOROthiazide 75-50 mg per tablet Commonly known as:  [place] Take 0.5 Tabs by mouth daily. VITAMIN D3 2,000 unit Tab Generic drug:  cholecalciferol (vitamin D3) Take  by mouth daily. We Performed the Following AMB POC EKG ROUTINE W/ 12 LEADS, INTER & REP [01360 CPT(R)] Introducing [facility]! Antonia Bedoya introduces [employer] patient portal. Now you can access parts of your medical record, email your doctor's office, and request medication refills online. 1. In your internet browser, go to [email or web address] 2. Click on the First Time User? Click Here link in the Sign In box. You will see the New Member Sign Up page. 3. Enter your [employer] Access Code exactly as it appears below. You will not need to use this code after youve completed the sign-up process. If you do not sign up before the expiration date, you must request a new code. · [employer] Access Code: [email or web address] Expires: 9/28/2017  7:57 AM 
 
4. Enter the last four digits of your Social Security Number (xxxx) and Date of Birth (mm/dd/yyyy) as indicated and click Submit. You will be taken to the next sign-up page. 5. Create a [employer] ID. This will be your [employer] login ID and cannot be changed, so think of one that is secure and easy to remember. 6. Create a [employer] password. You can change your password at any time. 7. Enter your Password Reset Question and Answer. This can be used at a later time if you forget your password. 8. Enter your e-mail address. You will receive e-mail notification when new information is available in [address] 9. Click Sign Up. You can now view and download portions of your medical record. 10. Click the Download Summary menu link to download a portable copy of your medical information. If you have questions, please visit the Frequently Asked Questions section of the [employer] website. Remember, [employer] is NOT to be used for urgent needs. For medical emergencies, dial 911. Now available from your iPhone and Android! Please provide this summary of care documentation to your next provider. Your primary care clinician is listed as Delmy Veras. If you have any questions after today's visit, please call [phone].

## 2017-07-28 NOTE — PROGRESS NOTES
1. Have you been to the ER, urgent care clinic since your last visit? Hospitalized since your last visit? no  2. Have you seen or consulted any other health care providers outside of the [address] [place] [address] since your last visit? Include any pap smears or colon screening.   no

## 2017-08-22 ENCOUNTER — TELEPHONE (OUTPATIENT)
Dept: CARDIOLOGY CLINIC | Age: 77
End: 2017-08-22

## 2017-11-06 ENCOUNTER — HOSPITAL ENCOUNTER (OUTPATIENT)
Dept: MAMMOGRAPHY | Age: 77
Discharge: HOME | End: 2017-11-06
Attending: FAMILY MEDICINE
Payer: MEDICARE

## 2017-11-06 DIAGNOSIS — Z12.31 VISIT FOR SCREENING MAMMOGRAM: ICD-10-CM

## 2017-11-06 PROCEDURE — 77063 BREAST TOMOSYNTHESIS BI: CPT

## 2017-11-06 PROCEDURE — 77067 SCR MAMMO BI INCL CAD: CPT

## 2017-11-14 ENCOUNTER — DOCUMENTATION ONLY (OUTPATIENT)
Dept: SURGERY | Age: 77
End: 2017-11-14

## 2017-11-14 NOTE — PROGRESS NOTES
Met with patient to discuss informed consent for BRCA testing via saliva collection. Answered all questions. She is aware that results may take up to 3 weeks to return. Collected saliva per package instructions, and mailed via FED EX.

## 2017-11-21 ENCOUNTER — OFFICE VISIT (OUTPATIENT)
Dept: SURGERY | Age: 77
End: 2017-11-21

## 2017-11-21 VITALS
SYSTOLIC BLOOD PRESSURE: 124 MMHG | RESPIRATION RATE: 20 BRPM | BODY MASS INDEX: 28.9 KG/M2 | DIASTOLIC BLOOD PRESSURE: 68 MMHG | WEIGHT: 158 LBS | HEART RATE: 71 BPM | TEMPERATURE: 97.8 F

## 2017-11-21 DIAGNOSIS — R10.84 GENERALIZED POSTPRANDIAL ABDOMINAL PAIN: ICD-10-CM

## 2017-11-21 DIAGNOSIS — R10.13 EPIGASTRIC ABDOMINAL PAIN: Primary | ICD-10-CM

## 2017-11-21 RX ORDER — GUAIFENESIN 100 MG/5ML
81 LIQUID (ML) ORAL DAILY
COMMUNITY
End: 2018-10-18 | Stop reason: ALTCHOICE

## 2017-11-21 NOTE — PROGRESS NOTES
1. Have you been to the ER, urgent care clinic since your last visit? Hospitalized since your last visit? No    2. Have you seen or consulted any other health care providers outside of the [address] [place] [address] since your last visit? Include any pap smears or colon screening.  No

## 2017-11-21 NOTE — PROGRESS NOTES
[facility]  General Surgery    Name: Cherry Desouza MRN: [id number]   : 1940 [facility]   Date: 2017 Admission Date: No admission date for patient encounter. Subjective:  Patient complains of swelling in the upper abdomen. She states that she thinks her hernia is returning. Her other complaint is that every time she eats anything she gets pain in the epigastric region. She denies any accompanying nausea vomiting diarrhea or constipation. She states that she works out 3-4 times per week swimming. She has been trying to decrease her waistline. The waistline has not decreased. She states that the symptoms and the swelling have been going on for approximately 2 months simultaneously. Objective:  Vitals:    17 0949   BP: 124/68   Pulse: 71   Resp: 20   Temp: 97.8 °F (36.6 °C)   TempSrc: Oral   Weight: 71.7 kg (158 lb)       Physical Exam:    General: Awake and alert, oriented ×4, no apparent distress   Abdomen: abdomen is soft without tenderness. No evidence of hernia recurrence on Valsalva or  cough. No masses, organomegaly or guarding    Current Medications:  Current Outpatient Prescriptions   Medication Sig Dispense Refill    aspirin 81 mg chewable tablet Take 81 mg by mouth daily.  metoprolol succinate (TOPROL-XL) 25 mg XL tablet Take 1 Tab by mouth daily. 90 Tab 3    pitavastatin (LIVALO) 1 mg tab tablet Take 1 Tab by mouth daily. 90 Tab 3    bisacodyl (DULCOLAX) 5 mg EC tablet Take 4 tablets with 20 ounces of water 4 Tab 0    cholecalciferol, vitamin D3, (VITAMIN D3) 2,000 unit tab Take  by mouth daily.  CALCIUM PO Take 1,200 mg by mouth daily.  triamterene-hydroCHLOROthiazide (MAXZIDE) 75-50 mg per tablet Take 0.5 Tabs by mouth daily.  clonazePAM (KLONOPIN) 0.5 mg tablet 0.5 mg daily.  MULTIVITAMINS (MULTIVITAMIN PO) Take  by mouth daily.       oxyCODONE-acetaminophen (PERCOCET) 5-325 mg per tablet Take 1-2 Tabs by mouth every four (4) hours as needed for Pain. Max Daily Amount: 12 Tabs. 40 Tab 0    meloxicam (MOBIC) 7.5 mg tablet Take 2 Tabs by mouth daily. 30 Tab 0       Chart and notes reviewed. Data reviewed. I have evaluated and examined the patient. IMPRESSION:   · Patient is 11 months out from robot-assisted laparoscopic hernia repair with mesh. She returns now with complaints of abdominal pain with eating and swelling and pain at the prior hernia site. PLAN:/DISCUSION:   · CT of the abdomen and pelvis to evaluate for source of abdominal pain and for hernia recurrence.   · Follow-up in 2 weeks        Trevin Lucas MD

## 2017-11-27 ENCOUNTER — DOCUMENTATION ONLY (OUTPATIENT)
Dept: SURGERY | Age: 77
End: 2017-11-27

## 2017-11-27 NOTE — PROGRESS NOTES
Informed patient her genetic test results were negative. Will review with her when she sees Dr. Kylie Lam, 12/5/17.

## 2017-11-29 ENCOUNTER — HOSPITAL ENCOUNTER (OUTPATIENT)
Dept: ULTRASOUND IMAGING | Age: 77
Discharge: HOME | End: 2017-11-29
Attending: FAMILY MEDICINE
Payer: MEDICARE

## 2017-11-29 ENCOUNTER — HOSPITAL ENCOUNTER (OUTPATIENT)
Dept: MAMMOGRAPHY | Age: 77
Discharge: HOME | End: 2017-11-29
Attending: FAMILY MEDICINE
Payer: MEDICARE

## 2017-11-29 DIAGNOSIS — R92.8 ABNORMAL MAMMOGRAM: ICD-10-CM

## 2017-11-29 PROCEDURE — 77065 DX MAMMO INCL CAD UNI: CPT

## 2017-11-29 PROCEDURE — 76642 ULTRASOUND BREAST LIMITED: CPT

## 2017-12-01 RX ORDER — DIAZEPAM 2 MG/1
2 TABLET ORAL ONCE
Qty: 2 TAB | Refills: 0 | Status: SHIPPED | OUTPATIENT
Start: 2017-12-01 | End: 2017-12-01

## 2017-12-04 ENCOUNTER — HOSPITAL ENCOUNTER (OUTPATIENT)
Dept: CT IMAGING | Age: 77
Discharge: HOME | End: 2017-12-04
Attending: SURGERY
Payer: MEDICARE

## 2017-12-04 DIAGNOSIS — R10.13 EPIGASTRIC ABDOMINAL PAIN: ICD-10-CM

## 2017-12-04 DIAGNOSIS — R10.84 GENERALIZED POSTPRANDIAL ABDOMINAL PAIN: ICD-10-CM

## 2017-12-04 LAB — CREAT UR-MCNC: 1.1 MG/DL (ref 0.6–1.3)

## 2017-12-04 PROCEDURE — 82565 ASSAY OF CREATININE: CPT

## 2017-12-04 PROCEDURE — 74177 CT ABD & PELVIS W/CONTRAST: CPT

## 2017-12-04 PROCEDURE — 74011636320 HC RX REV CODE- 636/320: Performed by: SURGERY

## 2017-12-04 RX ADMIN — IOPAMIDOL 70 ML: 612 INJECTION, SOLUTION INTRAVENOUS at 16:00

## 2017-12-05 ENCOUNTER — OFFICE VISIT (OUTPATIENT)
Dept: SURGERY | Age: 77
End: 2017-12-05

## 2017-12-05 ENCOUNTER — HOSPITAL ENCOUNTER (OUTPATIENT)
Dept: LAB | Age: 77
Discharge: HOME | End: 2017-12-05
Payer: MEDICARE

## 2017-12-05 VITALS
TEMPERATURE: 97.6 F | HEART RATE: 68 BPM | DIASTOLIC BLOOD PRESSURE: 68 MMHG | RESPIRATION RATE: 18 BRPM | WEIGHT: 159.2 LBS | SYSTOLIC BLOOD PRESSURE: 120 MMHG | BODY MASS INDEX: 29.12 KG/M2

## 2017-12-05 DIAGNOSIS — N63.0 LUMP OR MASS IN BREAST: Primary | ICD-10-CM

## 2017-12-05 PROCEDURE — 88305 TISSUE EXAM BY PATHOLOGIST: CPT | Performed by: SURGERY

## 2017-12-05 NOTE — PROGRESS NOTES
[facility]  General Surgery    [facility]    12/5/2017      Preoperative Dx: Suspicious mass  right breast    Postoperative Dx:  Same    Procedure:  Ultrasound guided mammatome biopsy of the right breast    Surgeon:  Marline Morales    Assistant:  right    Anesthesia:  Local - 1% lidocaine with epinephrine    Findings:  Breast mass    Specimen:  Core biopsy specimens of  breast mass    EBL:  5 mL    Complications:  None    Brief Operative Indication:  Suspicious mass right breast    Description of Operation:  Informed consent was obtained from the patient. Time out was performed to insure correct site surgery. The mass in the breast was identified at the 1 o'clock position approximately 3  cm from the nipple. It measured 8  mm x   4 mm. The breast was prepped with betadine solution. Local anesthetic was infiltrated into the skin and breast tissue. The 10 gauge rotating biopsy needle was inserted under ultrasound guidance. Multiple cores of tissue were obtained. A clip was placed under ultrasound guidance. Pressure was held at the biopsy site to obtain hemostasis. A dressing was placed. The patient tolerated the procedure very well. Disposition:  She was stable upon exiting the office.

## 2017-12-05 NOTE — PROGRESS NOTES
[facility]  General Surgery    Subjective:  Patient returns today to follow-up the CAT scan of the abdomen pelvis which was ordered because of her persistent abdominal pain which we discussed as being negative for any evidence of hernia or inflammatory process or abscess. 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 We also discussed the findings of mammogram and ultrasound which I did review independently on the monitor as well. I agree with the findings of the BI-RADS 4 lesion in the 1 o'clock position 3 cm from the nipple in the upper inner quadrant of the right breast that is 9 mm in size. Objective:  Vitals:    12/05/17 1348   BP: 120/68   Pulse: 68   Resp: 18   Temp: 97.6 °F (36.4 °C)   Weight: 72.2 kg (159 lb 3.2 oz)       Physical Exam:    General: Awake and alert, oriented ×4, no apparent distress   Breasts:    Right: No dimpling, discoloration, nipple inversion or retractions. No axillary or supraclavicular lymphadenopathy. No mass          Current Medications:  Current Outpatient Prescriptions   Medication Sig Dispense Refill    aspirin 81 mg chewable tablet Take 81 mg by mouth daily.  metoprolol succinate (TOPROL-XL) 25 mg XL tablet Take 1 Tab by mouth daily. 90 Tab 3    pitavastatin (LIVALO) 1 mg tab tablet Take 1 Tab by mouth daily. 90 Tab 3    bisacodyl (DULCOLAX) 5 mg EC tablet Take 4 tablets with 20 ounces of water 4 Tab 0    meloxicam (MOBIC) 7.5 mg tablet Take 2 Tabs by mouth daily. 30 Tab 0    cholecalciferol, vitamin D3, (VITAMIN D3) 2,000 unit tab Take  by mouth daily.  CALCIUM PO Take 1,200 mg by mouth daily.  triamterene-hydroCHLOROthiazide (MAXZIDE) 75-50 mg per tablet Take 0.5 Tabs by mouth daily.  clonazePAM (KLONOPIN) 0.5 mg tablet 0.5 mg daily.  MULTIVITAMINS (MULTIVITAMIN PO) Take  by mouth daily.       oxyCODONE-acetaminophen (PERCOCET) 5-325 mg per tablet Take 1-2 Tabs by mouth every four (4) hours as needed for Pain. Max Daily Amount: 12 Tabs. 40 Tab 0       Chart and notes reviewed. Data reviewed. I have evaluated and examined the patient.         Impression and plan:  Patient with BI-RADS 4 lesion in the upper inner quadrant of the right breast.  See ultrasound report  See ultrasound-guided mammotome biopsy report  Follow-up in 1 week       Sailaja Quinn MD

## 2017-12-05 NOTE — PROGRESS NOTES
[facility] VIEW  OFFICE PROCEDURE PROGRESS NOTE        Chart reviewed for the following:   David Sahni MD, have reviewed the History, Physical and updated the Allergic reactions for Toshia Haro     TIME OUT performed immediately prior to start of procedure:   David Sahni MD, have performed the following reviews on Toshia Haro prior to the start of the procedure:            * Patient was identified by name and date of birth   * Agreement on procedure being performed was verified  * Risks and Benefits explained to the patient  * Procedure site verified and marked as necessary  * Patient was positioned for comfort  * Consent was signed and verified     Time: 1450 hrs      Date of procedure: 12/5/2017    Procedure performed by:  Sincere Vasquez MD    Provider assisted by: Kelsie Gonzalez LPN    Patient assisted by: self    How tolerated by patient: tolerated the procedure well with no complications    Post Procedural Pain Scale: 2 - Hurts Little Bit    Comments: none

## 2017-12-05 NOTE — PROGRESS NOTES
[facility]  General Surgery    Ultrasound of the right breast was performed from the 12 - 3 position in the upper inner quadrant. In the 1 o'clock position 3 cm from the nipple a solid mass was visualized that measures 4 mm x 8 mm. The borders are irreguladr.  The width is greater than the height.

## 2017-12-05 NOTE — MR AVS SNAPSHOT
Visit Information Date & Time Provider Department Dept. Phone Encounter #  
 12/5/2017  2:00 PM MD Brendon Mendiola 33 [phone] Your Appointments 12/12/2017 11:30 AM  
Follow Up with MD Brendon Mendiola 33 ([facility]) Appt Note: 1 wk f/up [address] [place] [phone] [id number] [address] [facility] Upcoming Health Maintenance Date Due DTaP/Tdap/Td series (1 - Tdap) 8/22/1960 ZOSTER VACCINE AGE 60> 6/22/1999 GLAUCOMA SCREENING Q2Y 8/22/2004 Pneumococcal 65+ High/Highest Risk (1 of 2 - PCV13) 8/22/2004 MEDICARE YEARLY EXAM 8/22/2004 Influenza Age 5 to Adult 8/1/2017 Allergies as of 12/5/2017  Review Complete On: 12/5/2017 By: Luis Carlos Flor LPN Severity Noted Reaction Type Reactions Latex  01/26/2015    Unable to Obtain Norco [Hydrocodone-acetaminophen] High 02/01/2017   Systemic Swelling Patient experienced throat swelling, hoarseness and difficulty swallowing. Adhesive  03/29/2016    Hives Codeine  03/26/2010    Nausea Only Pt states she is not allergic. Current Immunizations  Reviewed on 12/5/2011 Name Date Influenza Vaccine Split 12/5/2011 Not reviewed this visit Vitals BP Pulse Temp Resp Weight(growth percentile) BMI  
 120/68 68 97.6 °F (36.4 °C) 18 159 lb 3.2 oz (72.2 kg) 29.12 kg/m2 OB Status Smoking Status Hysterectomy Never Smoker Vitals History BMI and BSA Data Body Mass Index Body Surface Area  
 29.12 kg/m 2 1.78 m 2 Preferred Pharmacy Pharmacy Name Phone [facility] [address], [address] [address] [phone] Your Updated Medication List  
  
   
This list is accurate as of: 12/5/17  3:08 PM.  Always use your most recent med list.  
  
  
  
  
 aspirin 81 mg chewable tablet Take 81 mg by mouth daily. bisacodyl 5 mg EC tablet Commonly known as:  DULCOLAX Take 4 tablets with 20 ounces of water CALCIUM PO Take 1,200 mg by mouth daily. clonazePAM 0.5 mg tablet Commonly known as:  KlonoPIN  
0.5 mg daily. meloxicam 7.5 mg tablet Commonly known as:  MOBIC Take 2 Tabs by mouth daily. metoprolol succinate 25 mg XL tablet Commonly known as:  TOPROL-XL Take 1 Tab by mouth daily. MULTIVITAMIN PO Take  by mouth daily. oxyCODONE-acetaminophen 5-325 mg per tablet Commonly known as:  PERCOCET Take 1-2 Tabs by mouth every four (4) hours as needed for Pain. Max Daily Amount: 12 Tabs. pitavastatin calcium 1 mg Tab tablet Commonly known as:  LIVALO Take 1 Tab by mouth daily. triamterene-hydroCHLOROthiazide 75-50 mg per tablet Commonly known as:  Ashvin Domenico Take 0.5 Tabs by mouth daily. VITAMIN D3 2,000 unit Tab Generic drug:  cholecalciferol (vitamin D3) Take  by mouth daily. Introducing [facility]! 3 [facility] introduces Embanet patient portal. Now you can access parts of your medical record, email your doctor's office, and request medication refills online. 1. In your internet browser, go to [email or web address] 2. Click on the First Time User? Click Here link in the Sign In box. You will see the New Member Sign Up page. 3. Enter your Embanet Access Code exactly as it appears below. You will not need to use this code after youve completed the sign-up process. If you do not sign up before the expiration date, you must request a new code. · Embanet Access Code: [email or web address] Expires: 1/15/2018  2:15 PM 
 
4. Enter the last four digits of your Social Security Number (xxxx) and Date of Birth (mm/dd/yyyy) as indicated and click Submit. You will be taken to the next sign-up page. 5. Create a Pricing Engine ID. This will be your Pricing Engine login ID and cannot be changed, so think of one that is secure and easy to remember. 6. Create a Pricing Engine password. You can change your password at any time. 7. Enter your Password Reset Question and Answer. This can be used at a later time if you forget your password. 8. Enter your e-mail address. You will receive e-mail notification when new information is available in [address] 9. Click Sign Up. You can now view and download portions of your medical record. 10. Click the Download Summary menu link to download a portable copy of your medical information. If you have questions, please visit the Frequently Asked Questions section of the Pricing Engine website. Remember, Pricing Engine is NOT to be used for urgent needs. For medical emergencies, dial 911. Now available from your iPhone and Android! Please provide this summary of care documentation to your next provider. Your primary care clinician is listed as Dino Regalado. If you have any questions after today's visit, please call [phone].

## 2017-12-12 ENCOUNTER — OFFICE VISIT (OUTPATIENT)
Dept: SURGERY | Age: 77
End: 2017-12-12

## 2017-12-12 VITALS
WEIGHT: 160 LBS | DIASTOLIC BLOOD PRESSURE: 68 MMHG | HEART RATE: 72 BPM | BODY MASS INDEX: 29.26 KG/M2 | TEMPERATURE: 97.5 F | RESPIRATION RATE: 18 BRPM | SYSTOLIC BLOOD PRESSURE: 130 MMHG

## 2017-12-12 DIAGNOSIS — N63.10 MASS OF RIGHT BREAST: Primary | ICD-10-CM

## 2017-12-12 NOTE — PROGRESS NOTES
[facility]  General Surgery    Subjective:  Patient states that she has bruising at the biopsy site in the right breast.  Otherwise she is okay. Objective:  Vitals:    12/12/17 1127   BP: 130/68   Pulse: 72   Resp: 18   Temp: 97.5 °F (36.4 °C)   Weight: 72.6 kg (160 lb)       Physical Exam:    General: Awake and alert,   Breasts:    Right: No dimpling, nipple inversion or retractions. Yellow purplish discoloration at the prior biopsy site   No axillary or supraclavicular lymphadenopathy. No mass          Current Medications:  Current Outpatient Prescriptions   Medication Sig Dispense Refill    aspirin 81 mg chewable tablet Take 81 mg by mouth daily.  metoprolol succinate (TOPROL-XL) 25 mg XL tablet Take 1 Tab by mouth daily. 90 Tab 3    pitavastatin (LIVALO) 1 mg tab tablet Take 1 Tab by mouth daily. 90 Tab 3    bisacodyl (DULCOLAX) 5 mg EC tablet Take 4 tablets with 20 ounces of water 4 Tab 0    meloxicam (MOBIC) 7.5 mg tablet Take 2 Tabs by mouth daily. 30 Tab 0    cholecalciferol, vitamin D3, (VITAMIN D3) 2,000 unit tab Take  by mouth daily.  CALCIUM PO Take 1,200 mg by mouth daily.  triamterene-hydroCHLOROthiazide (MAXZIDE) 75-50 mg per tablet Take 0.5 Tabs by mouth daily.  clonazePAM (KLONOPIN) 0.5 mg tablet 0.5 mg daily.  MULTIVITAMINS (MULTIVITAMIN PO) Take  by mouth daily.  oxyCODONE-acetaminophen (PERCOCET) 5-325 mg per tablet Take 1-2 Tabs by mouth every four (4) hours as needed for Pain. Max Daily Amount: 12 Tabs. 40 Tab 0       Chart and notes reviewed. Data reviewed. I have evaluated and examined the patient. Impression and plan:  Patient with benign changes of the right breast.  We will follow the right breast at six-month intervals over the next 18 months to ensure that no other changes are noted.   The patient will continue monthly self breast exam.  She will be contacted regarding 3D diagnostic mammography of the right breast in 6 months and she will follow-up with us in 6 months for clinical breast exam.     Zoran Hernandez MD

## 2018-03-16 ENCOUNTER — OFFICE VISIT (OUTPATIENT)
Dept: ORTHOPEDIC SURGERY | Facility: CLINIC | Age: 78
End: 2018-03-16

## 2018-03-16 VITALS
HEART RATE: 69 BPM | TEMPERATURE: 96.1 F | RESPIRATION RATE: 18 BRPM | BODY MASS INDEX: 28.52 KG/M2 | DIASTOLIC BLOOD PRESSURE: 65 MMHG | SYSTOLIC BLOOD PRESSURE: 139 MMHG | WEIGHT: 155 LBS | OXYGEN SATURATION: 96 % | HEIGHT: 62 IN

## 2018-03-16 DIAGNOSIS — M25.512 ACUTE PAIN OF LEFT SHOULDER: ICD-10-CM

## 2018-03-16 DIAGNOSIS — S05.12XA PERIORBITAL CONTUSION OF LEFT EYE, INITIAL ENCOUNTER: ICD-10-CM

## 2018-03-16 DIAGNOSIS — S40.012A CONTUSION OF LEFT SHOULDER, INITIAL ENCOUNTER: Primary | ICD-10-CM

## 2018-03-16 DIAGNOSIS — M75.52 ACUTE SHOULDER BURSITIS, LEFT: ICD-10-CM

## 2018-03-16 DIAGNOSIS — S43.402A SPRAIN OF LEFT SHOULDER, UNSPECIFIED SHOULDER SPRAIN TYPE, INITIAL ENCOUNTER: ICD-10-CM

## 2018-03-16 DIAGNOSIS — M85.80 OSTEOPENIA DETERMINED BY X-RAY: ICD-10-CM

## 2018-03-16 RX ORDER — BUPIVACAINE HYDROCHLORIDE 2.5 MG/ML
4 INJECTION, SOLUTION EPIDURAL; INFILTRATION; INTRACAUDAL ONCE
Qty: 4 ML | Refills: 0
Start: 2018-03-16 | End: 2018-03-16

## 2018-03-16 RX ORDER — BETAMETHASONE SODIUM PHOSPHATE AND BETAMETHASONE ACETATE 3; 3 MG/ML; MG/ML
6 INJECTION, SUSPENSION INTRA-ARTICULAR; INTRALESIONAL; INTRAMUSCULAR; SOFT TISSUE ONCE
Qty: 0.5 ML | Refills: 0
Start: 2018-03-16 | End: 2018-03-16

## 2018-03-16 NOTE — PROGRESS NOTES
Patient: Jessie Flores                MRN: [id number]       SSN: [id number]  YOB: 1940        AGE: 65 y.o. SEX: female    PCP: Swati Pete MD  03/16/18    Chief Complaint   Patient presents with    Shoulder Pain     Left     HISTORY:  Jessie Flores is a 65 y.o. female who is seen for left shoulder pain following an injury. She states she tripped over a rubber mat at work on 3/9/18 and injured her left shoulder. She was seen at [facility] ED on 3/12/18 where x-rays were negative fracture. She was last seen for left shoulder pain in August of 2015. She states she is having trouble with left shoulder ROM. She notes left shoulder pain with overhead activities and at night. She is s/p right RCR on 4/2/14. Pain Assessment  3/16/2018   Location of Pain Shoulder   Location Modifiers Left   Severity of Pain 10   Quality of Pain Sharp; Aching   Duration of Pain Persistent   Frequency of Pain Intermittent   Aggravating Factors Bending;Stretching;Straightening   Limiting Behavior Yes   Relieving Factors Ice;Heat   Result of Injury Yes   Work-Related Injury Yes   Type of Injury Fall     Occupation, etc:  Ms. Gertrude Boles works at [employer] a [employer] and [employer] in [place]. 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 She lives alone in [place], [facility] by [place]. She was previously involved in a aquatic program at the [facility]. Current weight is 155 pounds. She is 5'2\" tall.       Lab Results   Component Value Date/Time    Hemoglobin A1c 5.7 05/12/2010 09:50 AM     Weight Metrics 3/16/2018 12/12/2017 12/5/2017 11/21/2017 7/28/2017 6/30/2017 3/7/2017   Weight 155 lb 160 lb 159 lb 3.2 oz 158 lb 155 lb 152 lb 152 lb   BMI 28.35 kg/m2 29.26 kg/m2 29.12 kg/m2 28.9 kg/m2 28.35 kg/m2 27.8 kg/m2 27.8 kg/m2       Patient Active Problem List   Diagnosis Code    HTN (hypertension) I10    Hypercholesteremia E78.00    Anxiety F41.9    Vitamin D deficiency E55.9    Ataxic gait R26.0    Insomnia G47.00    Breast cancer (HCC) C50.919    S/P partial mastectomy, left, with sentinel lymph node biopsy, 5/11 Z90.10    FHx: breast cancer Z80.3    Pre-operative cardiovascular examination Z01.810    Abnormal finding on EKG R94.31    Personal history of malignant neoplasm of breast Z85.3    Coronary artery disease involving native coronary artery of native heart with angina pectoris (HCC) I25.119    Coronary artery disease involving native coronary artery of native heart without angina pectoris I25.10    Precordial pain R07.2    Incisional hernia without mention of obstruction or gangrene K43.2     REVIEW OF SYSTEMS: All Below are Negative except: See HPI   Constitutional: negative for fever, chills, and weight loss. Cardiovascular: negative for chest pain, claudication, leg swelling, SOB, RODRIGUEZ   Gastrointestinal: Negative for pain, N/V/C/D, Blood in stool or urine, dysuria,  hematuria, incontinence, pelvic pain. Musculoskeletal: See HPI   Neurological: Negative for dizziness and weakness. Negative for headaches, Visual changes, confusion, seizures   Phychiatric/Behavioral: Negative for depression, memory loss, substance  abuse. Extremities: Negative for hair changes, rash, or skin lesion changes. Hematologic: Negative for bleeding problems, bruising, pallor or swollen lymph  nodes   Peripheral Vascular: No calf pain, no circulation deficits. Social History     Social History    Marital status:      Spouse name: N/A    Number of children: N/A    Years of education: N/A     Occupational History    Not on file.      Social History Main Topics    Smoking status: Never Smoker    Smokeless tobacco: Never Used      Comment: quit age 26    Alcohol use Yes      Comment: occasional    Drug use: No    Sexual activity: Not Currently     Other Topics Concern    Not on file     Social History Narrative      Allergies   Allergen Reactions    Latex Unable to Obtain    Norco [Hydrocodone-Acetaminophen] Swelling     Patient experienced throat swelling, hoarseness and difficulty swallowing.  Adhesive Hives    Codeine Nausea Only     Pt states she is not allergic. Current Outpatient Prescriptions   Medication Sig    aspirin 81 mg chewable tablet Take 81 mg by mouth daily.  metoprolol succinate (TOPROL-XL) 25 mg XL tablet Take 1 Tab by mouth daily.  pitavastatin (LIVALO) 1 mg tab tablet Take 1 Tab by mouth daily.  cholecalciferol, vitamin D3, (VITAMIN D3) 2,000 unit tab Take  by mouth daily.  CALCIUM PO Take 1,200 mg by mouth daily.  triamterene-hydroCHLOROthiazide (MAXZIDE) 75-50 mg per tablet Take 0.5 Tabs by mouth daily.  clonazePAM (KLONOPIN) 0.5 mg tablet 0.5 mg daily.  MULTIVITAMINS (MULTIVITAMIN PO) Take  by mouth daily.  bisacodyl (DULCOLAX) 5 mg EC tablet Take 4 tablets with 20 ounces of water    oxyCODONE-acetaminophen (PERCOCET) 5-325 mg per tablet Take 1-2 Tabs by mouth every four (4) hours as needed for Pain. Max Daily Amount: 12 Tabs.  meloxicam (MOBIC) 7.5 mg tablet Take 2 Tabs by mouth daily. No current facility-administered medications for this visit.        PHYSICAL EXAMINATION:  Visit Vitals    /65    Pulse 69    Temp 96.1 °F (35.6 °C) (Oral)    Resp 18    Ht 5' 2\" (1.575 m)    Wt 155 lb (70.3 kg)    SpO2 96%    BMI 28.35 kg/m2      ORTHO EXAMINATION:  Examination Right shoulder Left shoulder   Skin Intact 5 cm anterior ecchymotic area   Effusion - -   Biceps deformity - -   Atrophy - -   AC joint tenderness - +   Acromial tenderness - +   Biceps tenderness - -   Forward flexion/Elevation  140   Active abduction  140   External rotation ROM 50 45   Internal rotation ROM 70 65   Apprehension - -   Impingement - +   Drop Arm Test - -   Neurovascular Intact Intact   Left periorbital ecchymosis  Painful arc of motion beyond 90    PROCEDURE:  After discussing treatment options, patient's left shoulder was injected with 4 cc Marcaine and 1/2 cc Celestone. Chart reviewed for the following:   Brit Adkins MD, have reviewed the History, Physical and updated the Allergic reactions for Nico Reamer     TIME OUT performed immediately prior to start of procedure:  I, Darshan Luis MD, have performed the following reviews on Nico Reamer prior to the start of the procedure:            * Patient was identified by name and date of birth   * Agreement on procedure being performed was verified  * Risks and Benefits explained to the patient  * Procedure site verified and marked as necessary  * Patient was positioned for comfort  * Consent was obtained     Time: 11:23 AM     Date of procedure: 3/16/2018    Procedure performed by:  Darshan Luis MD    Ms. Miguel Mixon tolerated the procedure well with no complications. RADIOGRAPHS:  XR LEFT RIB 3/12/18  IMPRESSION: No acute pulmonary disease. No left rib fracture. XR FACIAL BONES 3/12/18  IMPRESSION: Normal.     XR LEFT SHOULDER 3/12/18  IMPRESSION: Normal left shoulder.   -I have independently reviewed these images during this office visit. -Dr. Plasencia Plater:      ICD-10-CM ICD-9-CM    1. Contusion of left shoulder, initial encounter S40.012A 923.00 betamethasone (CELESTONE SOLUSPAN) 6 mg/mL injection      BETAMETHASONE ACETATE & SODIUM PHOSPHATE INJECTION 3 MG EA.      DRAIN/INJECT LARGE JOINT/BURSA      bupivacaine, PF, (MARCAINE, PF,) 0.25 % (2.5 mg/mL) injection      REFERRAL TO PHYSICAL THERAPY   2. Osteopenia determined by x-ray M85.80 733.90    3.  Sprain of left shoulder, unspecified shoulder sprain type, initial encounter S43.402A 840.9 betamethasone (CELESTONE SOLUSPAN) 6 mg/mL injection      BETAMETHASONE ACETATE & SODIUM PHOSPHATE INJECTION 3 MG EA.      DRAIN/INJECT LARGE JOINT/BURSA      bupivacaine, PF, (MARCAINE, PF,) 0.25 % (2.5 mg/mL) injection      REFERRAL TO PHYSICAL THERAPY   4. Periorbital contusion of left eye, initial encounter S05. 12XA 921.1    5. Acute shoulder bursitis, left M75.52 726.10 betamethasone (CELESTONE SOLUSPAN) 6 mg/mL injection      BETAMETHASONE ACETATE & SODIUM PHOSPHATE INJECTION 3 MG EA.      DRAIN/INJECT LARGE JOINT/BURSA      bupivacaine, PF, (MARCAINE, PF,) 0.25 % (2.5 mg/mL) injection      REFERRAL TO PHYSICAL THERAPY   6. Acute pain of left shoulder M25.512 719.41 betamethasone (CELESTONE SOLUSPAN) 6 mg/mL injection      BETAMETHASONE ACETATE & SODIUM PHOSPHATE INJECTION 3 MG EA.      DRAIN/INJECT LARGE JOINT/BURSA      bupivacaine, PF, (MARCAINE, PF,) 0.25 % (2.5 mg/mL) injection      REFERRAL TO PHYSICAL THERAPY     PLAN:  After discussing treatment options, patient's left shoulder was injected with 4 cc Marcaine and 1/2 cc Celestone. She will follow up as needed. She will start a brief course of outpatient physical therapy to her left shoulder.     Scribed by Lavelle Rodas ([facility]) as dictated by Enrique Maher MD

## 2018-03-16 NOTE — MR AVS SNAPSHOT
[address] [place] [address], [address] [place] [address] 
[phone] Patient: Alejandro Cedeno 
MRN: [id number] OKB:5/[id number] Visit Information Date & Time Provider Department Dept. Phone Encounter #  
 3/16/2018 10:40 AM Chino Gonsalez MD [facility] - [facility] [phone] Follow-up Instructions Return if symptoms worsen or fail to improve. Your Appointments 4/12/2018  4:00 PM  
Follow Up with Wayne Rendon MD  
[facility] ([facility]) Appt Note: follow up after testing; Wilman patient//follow up after testing/r/s'd with the patient Durga Lyons [place] [phone] [phone] [address] 6/13/2018 10:00 AM  
Follow Up with Daniel Alvarez MD  
[facility] ([facility]) Appt Note: 6 month f/up / Mammo; 6 month f/up / Mammo [address] [place] [address] 240 Geradine Pulling [address] [facility] Upcoming Health Maintenance Date Due DTaP/Tdap/Td series (1 - Tdap) 8/22/1960 ZOSTER VACCINE AGE 60> 6/22/1999 GLAUCOMA SCREENING Q2Y 8/22/2004 Pneumococcal 65+ High/Highest Risk (1 of 2 - PCV13) 8/22/2004 MEDICARE YEARLY EXAM 8/22/2004 Influenza Age 5 to Adult 8/1/2017 Allergies as of 3/16/2018  Review Complete On: 3/16/2018 By: Ezequiel Smoke Severity Noted Reaction Type Reactions Latex  01/26/2015    Unable to Obtain Norco [Hydrocodone-acetaminophen] High 02/01/2017   Systemic Swelling Patient experienced throat swelling, hoarseness and difficulty swallowing. Adhesive  03/29/2016    Hives Codeine  03/26/2010    Nausea Only Pt states she is not allergic. Current Immunizations  Reviewed on 12/5/2011 Name Date Influenza Vaccine Split 12/5/2011 Not reviewed this visit You Were Diagnosed With   
  
 Codes Comments Contusion of left shoulder, initial encounter    -  Primary ICD-10-CM: D54.315B ICD-9-CM: 923.00 Osteopenia determined by x-ray     ICD-10-CM: M85.80 ICD-9-CM: 733.90 Sprain of left shoulder, unspecified shoulder sprain type, initial encounter     ICD-10-CM: N35.167R ICD-9-CM: 840.9 Periorbital contusion of left eye, initial encounter     ICD-10-CM: S05. 94 [facility] ICD-9-CM: 921.1 Acute shoulder bursitis, left     ICD-10-CM: M75.52 
ICD-9-CM: 726.10 Acute pain of left shoulder     ICD-10-CM: M25.512 ICD-9-CM: 719.41 Vitals BP Pulse Temp Resp Height(growth percentile) Weight(growth percentile) 139/65 69 96.1 °F (35.6 °C) (Oral) 18 5' 2\" (1.575 m) 155 lb (70.3 kg) SpO2 BMI OB Status Smoking Status 96% 28.35 kg/m2 Hysterectomy Never Smoker BMI and BSA Data Body Mass Index Body Surface Area  
 28.35 kg/m 2 1.75 m 2 [facility] 530 [phone] Your Updated Medication List  
  
   
This list is accurate as of 3/16/18 11:26 AM.  Always use your most recent med list.  
  
  
  
  
 aspirin 81 mg chewable tablet Take 81 mg by mouth daily. bisacodyl 5 mg EC tablet Commonly known as:  DULCOLAX Take 4 tablets with 20 ounces of water CALCIUM PO Take 1,200 mg by mouth daily. clonazePAM 0.5 mg tablet Commonly known as:  KlonoPIN  
0.5 mg daily. meloxicam 7.5 mg tablet Commonly known as:  MOBIC Take 2 Tabs by mouth daily. metoprolol succinate 25 mg XL tablet Commonly known as:  TOPROL-XL Take 1 Tab by mouth daily. MULTIVITAMIN PO Take  by mouth daily. oxyCODONE-acetaminophen 5-325 mg per tablet Commonly known as:  PERCOCET  
 Take 1-2 Tabs by mouth every four (4) hours as needed for Pain. Max Daily Amount: 12 Tabs. pitavastatin calcium 1 mg Tab tablet Commonly known as:  LIVALO Take 1 Tab by mouth daily. triamterene-hydroCHLOROthiazide 75-50 mg per tablet Commonly known as:  Nolvia Asters Take 0.5 Tabs by mouth daily. VITAMIN D3 2,000 unit Tab Generic drug:  cholecalciferol (vitamin D3) Take  by mouth daily. Follow-up Instructions Return if symptoms worsen or fail to improve. Introducing [facility] & HEALTH SERVICES! [employer] introduces [employer] patient portal. Now you can access parts of your medical record, email your doctor's office, and request medication refills online. 1. In your internet browser, go to [email or web address] 2. Click on the First Time User? Click Here link in the Sign In box. You will see the New Member Sign Up page. 3. Enter your [employer] Access Code exactly as it appears below. You will not need to use this code after youve completed the sign-up process. If you do not sign up before the expiration date, you must request a new code. · [employer] Access Code: [email or web address] Expires: 6/7/2018 12:07 PM 
 
4. Enter the last four digits of your Social Security Number (xxxx) and Date of Birth (mm/dd/yyyy) as indicated and click Submit. You will be taken to the next sign-up page. 5. Create a [employer] ID. This will be your [employer] login ID and cannot be changed, so think of one that is secure and easy to remember. 6. Create a [employer] password. You can change your password at any time. 7. Enter your Password Reset Question and Answer. This can be used at a later time if you forget your password. 8. Enter your e-mail address. You will receive e-mail notification when new information is available in [address] 9. Click Sign Up. You can now view and download portions of your medical record. 10. Click the Download Summary menu link to download a portable copy of your medical information. If you have questions, please visit the Frequently Asked Questions section of the Kaggle website. Remember, [employer] is NOT to be used for urgent needs. For medical emergencies, dial 911. Now available from your iPhone and Android! Please provide this summary of care documentation to your next provider. Your primary care clinician is listed as Julio Marie. If you have any questions after today's visit, please call [phone].

## 2018-03-20 ENCOUNTER — HOSPITAL ENCOUNTER (OUTPATIENT)
Dept: PHYSICAL THERAPY | Age: 78
Discharge: HOME | End: 2018-03-20
Payer: MEDICARE

## 2018-03-20 PROCEDURE — G8984 CARRY CURRENT STATUS: HCPCS

## 2018-03-20 PROCEDURE — G8985 CARRY GOAL STATUS: HCPCS

## 2018-03-20 PROCEDURE — 97162 PT EVAL MOD COMPLEX 30 MIN: CPT

## 2018-03-20 PROCEDURE — 97110 THERAPEUTIC EXERCISES: CPT

## 2018-03-20 NOTE — PROGRESS NOTES
PT DAILY TREATMENT NOTE - [facility] 3-    Patient Name: Rhiannon Ospina  Date:3/20/2018  : 1940  [x]  Patient  Verified  Payor: [place] MEDICARE / Plan: [place] MEDICARE PART A & B / Product Type: Medicare /    In time:12:05  Out time:12:30  Total Treatment Time (min): 25  Total Timed Codes (min): 8  1:1 Treatment Time ( only): 25   Visit #: 1 of 10    Treatment Area: Pain in left shoulder [M25.512]  Contusion of left shoulder, initial encounter [S40.012A]  Bursitis of left shoulder [M75.52]    SUBJECTIVE  Pain Level (0-10 scale): 0/10  Any medication changes, allergies to medications, adverse drug reactions, diagnosis change, or new procedure performed?: [x] No    [] Yes (see summary sheet for update)  Subjective functional status/changes:   [x] See paper initial evaluation form in patient chart      OBJECTIVE    17 min [x]Eval                  []Re-Eval       8 min Therapeutic Exercise:  [] See flow sheet : Reviewed Pt's current HEP to resume aquatics 3x/wk; reviewed ice application to assist with swelling decrease   Rationale: increase ROM and increase strength to improve the patients ability to return to full ADLs, job duties without pain or difficulty          With   [x] TE   [] TA   [] neuro   [] other: Patient Education: [x] Review HEP    [] Progressed/Changed HEP based on:   [] positioning   [] body mechanics   [] transfers   [] heat/ice application    [] other:      Other Objective/Functional Measures: FOTO 66 pts     Pain Level (0-10 scale) post treatment: 0/10    ASSESSMENT/Changes in Function:     Patient will continue to benefit from skilled PT services to address functional mobility deficits, address ROM deficits, address strength deficits, analyze and address soft tissue restrictions, analyze and cue movement patterns, analyze and modify body mechanics/ergonomics, assess and modify postural abnormalities and instruct in home and community integration to attain remaining goals.      [x]  See Plan of Care         PLAN  []  Upgrade activities as tolerated     [x]  Continue plan of care  []  Update interventions per flow sheet       []  Discharge due to:_  []  Other:_      [place] Tramaine Schneider PT 3/20/2018  12:51 PM

## 2018-03-20 NOTE — MR AVS SNAPSHOT
[facility] 
[phone] Patient: Stephanie Hummel 
MRN: [id number]:4/53/6290 Visit Information Date & Time Provider Department Dept. Phone Encounter #  
 3/20/2018 12:00 PM Maritza Ink [address], PT [address]ustillos PT [address]  [phone] [id number] Your Appointments 4/12/2018  4:00 PM  
Follow Up with Jv Ley MD  
[facility] Appt Note: follow up after testing; Wilman patient//follow up after testing/r/s'd with the patient [place] [address] [address] [address] [phone] [facility] [place] [address] [address] 6/13/2018 10:00 AM  
Follow Up with Radha Romero MD  
[place] [address] ([facility] Appt Note: 6 month f/up / Mammo; 6 month f/up / Mammo [address] [place] [address] 240 [id number] [address] [facility] [address] Upcoming Health Maintenance Date Due DTaP/Tdap/Td series (1 - Tdap) 8/22/1960 ZOSTER VACCINE AGE 60> 6/22/1999 GLAUCOMA SCREENING Q2Y 8/22/2004 Pneumococcal 65+ High/Highest Risk (1 of 2 - PCV13) 8/22/2004 Influenza Age 5 to Adult 8/1/2017 Allergies as of 3/20/2018  Review Complete On: 3/16/2018 By: Radhika Garcia MD  
  
 Severity Noted Reaction Type Reactions Latex  01/26/2015    Unable to Obtain Norco [Hydrocodone-acetaminophen] High 02/01/2017   Systemic Swelling Patient experienced throat swelling, hoarseness and difficulty swallowing. Adhesive  03/29/2016    Hives Codeine  03/26/2010    Nausea Only Pt states she is not allergic. Current Immunizations  Reviewed on 12/5/2011 Name Date Influenza Vaccine Split 12/5/2011 Not reviewed this visit Vitals OB Status Smoking Status Hysterectomy Never Smoker Your Updated Medication List  
  
ASK your doctor about these medications   
 aspirin 81 mg chewable tablet Take 81 mg by mouth daily. bisacodyl 5 mg EC tablet Commonly known as:  DULCOLAX Take 4 tablets with 20 ounces of water CALCIUM PO Take 1,200 mg by mouth daily. clonazePAM 0.5 mg tablet Commonly known as:  KlonoPIN  
0.5 mg daily. meloxicam 7.5 mg tablet Commonly known as:  MOBIC Take 2 Tabs by mouth daily. metoprolol succinate 25 mg XL tablet Commonly known as:  TOPROL-XL Take 1 Tab by mouth daily. MULTIVITAMIN PO Take  by mouth daily. oxyCODONE-acetaminophen 5-325 mg per tablet Commonly known as:  PERCOCET Take 1-2 Tabs by mouth every four (4) hours as needed for Pain. Max Daily Amount: 12 Tabs. pitavastatin calcium 1 mg Tab tablet Commonly known as:  LIVALO Take 1 Tab by mouth daily. triamterene-hydroCHLOROthiazide 75-50 mg per tablet Commonly known as:  Deitra Aschoff Take 0.5 Tabs by mouth daily. VITAMIN D3 2,000 unit Tab Generic drug:  cholecalciferol (vitamin D3) Take  by mouth daily. To-Do List   
 03/20/2018 12:00 PM  
  Appointment with Ruddy Medley PT at [address] [place] [address] ([phone]) Introducing [facility] & HEALTH SERVICES! [employer] introduces [employer] patient portal. Now you can access parts of your medical record, email your doctor's office, and request medication refills online. 1. In your internet browser, go to [email or web address] 2. Click on the First Time User? Click Here link in the Sign In box. You will see the New Member Sign Up page. 3. Enter your [employer] Access Code exactly as it appears below. You will not need to use this code after youve completed the sign-up process. If you do not sign up before the expiration date, you must request a new code.  
 
· [facility] Access Code: [email or web address] 
 Expires: 6/7/2018 12:07 PM 
 
4. Enter the last four digits of your Social Security Number (xxxx) and Date of Birth (mm/dd/yyyy) as indicated and click Submit. You will be taken to the next sign-up page. 5. Create a Nanoogo ID. This will be your [employer] login ID and cannot be changed, so think of one that is secure and easy to remember. 6. Create a [employer] password. You can change your password at any time. 7. Enter your Password Reset Question and Answer. This can be used at a later time if you forget your password. 8. Enter your e-mail address. You will receive e-mail notification when new information is available in [address] 9. Click Sign Up. You can now view and download portions of your medical record. 10. Click the Download Summary menu link to download a portable copy of your medical information. If you have questions, please visit the Frequently Asked Questions section of the Nano[employer] website. Remember, [employer] is NOT to be used for urgent needs. For medical emergencies, dial 911. Now available from your iPhone and Android! Please provide this summary of care documentation to your next provider. Your primary care clinician is listed as Edna Carter. If you have any questions after today's visit, please call [phone].

## 2018-03-20 NOTE — PROGRESS NOTES
[facility] - [facility] Clemente Layton Kevin Alonzo [address]  [phone] fax  Plan of Care/ Statement of Necessity for Physical Therapy Services    Patient name: Lucio Alonso Start of Care: 3/20/2018   Referral source: Dejah Tang MD : 1940    Medical Diagnosis: Pain in left shoulder [M25.512]  Contusion of left shoulder, initial encounter [S40.012A]  Bursitis of left shoulder [M75.52]   Onset Date:3/9/18    Treatment Diagnosis: Left Shoulder Pain   Prior Hospitalization: see medical history Provider#: [id number]   Medications: Verified on Patient summary List    Comorbidities: Anxiety; hx of Heart Attack; hx Right RCR; hearing impaired - hearing aids   Prior Level of Function: Works part time as  at [employer]; lives in [facility] alone; active individual      The Plan of Care and following information is based on the information from the initial evaluation. Assessment/ key information: Pt is a 65 y.o., Right hand dominant female who presents with c/o left anterior shoulder pain, decreased ROM, and pain with sleeping on left side. Pt tripped over a rug while running outside to get supplies at work on 3/9/18, resulting in Pt falling on left side with arm outstretched. Pt has bruising along face, ribs, stomach, and legs but sustained no fractures or ligamentous damage. Pt received an injection into left shoulder on 3/15/18, that has resolved most pain. Pt continues to have pain with reaching overhead, lying on left side, pulling on pants, and washing back. Upon exam, Pt exhibited palpable tenderness along long head bicep tendon insertion that reproduced Pt's pain; left shoulder AROM of Flex 135 deg, ABD 90 deg, Ext 55 deg, IR 40 deg - with pain, ER 80 deg; 5/5 left shoulder strength without pain; and (-) provocative tests. Signs and symptoms consistent with left shoulder bursitis.   Pt would benefit from skilled PT to address above deficits to improve Pt's function and ability to return to prior activity level without pain or difficulty. Evaluation Complexity History MEDIUM  Complexity : 1-2 comorbidities / personal factors will impact the outcome/ POC ; Examination MEDIUM Complexity : 3 Standardized tests and measures addressing body structure, function, activity limitation and / or participation in recreation  ;Presentation MEDIUM Complexity : Evolving with changing characteristics  ; Clinical Decision Making MEDIUM Complexity : FOTO score of 26-74  Overall Complexity Rating: MEDIUM  Problem List: pain affecting function, decrease ROM, decrease strength, edema affecting function, decrease ADL/ functional abilitiies, decrease activity tolerance and decrease flexibility/ joint mobility   Treatment Plan may include any combination of the following: Therapeutic exercise, Therapeutic activities, Neuromuscular re-education, Physical agent/modality, Manual therapy, Patient education and Other: Iontophoresis with Dexamethasone  Patient / Family readiness to learn indicated by: asking questions, trying to perform skills and interest  Persons(s) to be included in education: patient (P)  Barriers to Learning/Limitations: None  Patient Goal (s): Repair injury.   Patient Self Reported Health Status: excellent  Rehabilitation Potential: good    Short Term Goals: To be accomplished in 1 weeks:  Goal: Pt to resume HEP of aquatics 3x/week to improve mobility and reduce pain with movements. Status at last note/certification: has not tried yet  Long Term Goals: To be accomplished in 5 weeks:  Goal: Pt to increase left shoulder AROM to WNL without increased pain for ease with lifting and carrying items at home and work. Status at last note/certification: Flex 177 deg, ABD 90 deg, Ext 55 deg, IR 40 deg - with pain, ER 80 deg  Goal: Pt to report < 5/10 pain at worst to be able to pull up pants and wash her back without difficulty.   Status at last note/certification: 41/14 pain at worst  Goal: Pt to report FOTO score of 66 pts to show improved function. Status at last note/certification: FOTO 64 pts    Frequency / Duration: Patient to be seen 2 times per week for 5 weeks. Patient/ Caregiver education and instruction: Diagnosis, prognosis, exercises   [x]  Plan of care has been reviewed with PTA    G-Codes (GP)  Carry   Current  CJ= 20-39%    Goal  CJ= 20-39%    The severity rating is based on clinical judgment and the FOTO score. Certification Period: 3/20/18 - 4/18/18    Sonja Schneider, PT 3/20/2018 12:53 PM  _____________________________________________________________________  I certify that the above Therapy Services are being furnished while the patient is under my care. I agree with the treatment plan and certify that this therapy is necessary.     Physician's Signature:____________________  Date:__________Time:______    Please sign and return to [facility] - [address]  [phone] fax

## 2018-03-27 ENCOUNTER — HOSPITAL ENCOUNTER (OUTPATIENT)
Dept: PHYSICAL THERAPY | Age: 78
Discharge: HOME | End: 2018-03-27
Payer: MEDICARE

## 2018-03-27 PROCEDURE — 97110 THERAPEUTIC EXERCISES: CPT

## 2018-03-27 NOTE — PROGRESS NOTES
PT DAILY TREATMENT NOTE - [facility]     Patient Name: Alejandro Necessary  Date:3/27/2018  : 1940  [x]  Patient  Verified  Payor: Daniela Bile / Plan: [place] MEDICARE PART A & B / Product Type: Medicare /    In time:8;50  Out time:9:25  Total Treatment Time (min): 35  Total Timed Codes (min): 35  1:1 Treatment Time ( [address] only): 15   Visit #: 2 of 10    Treatment Area: Pain in left shoulder [M25.512]  Contusion of left shoulder, initial encounter [S40.012A]  Bursitis of left shoulder [M75.52]    SUBJECTIVE  Pain Level (0-10 scale): 0  Any medication changes, allergies to medications, adverse drug reactions, diagnosis change, or new procedure performed?: [x] No    [] Yes (see summary sheet for update)  Subjective functional status/changes:   [] No changes reported  I'm sore, but not pain.       OBJECTIVE    Modality rationale: decrease inflammation and decrease pain to improve the patients ability to perform daily tasks   Min Type Additional Details    [] Estim:  []Unatt       []IFC  []Premod                        []Other:  []w/ice   []w/heat  Position:  Location:    [] Estim: []Att    []TENS instruct  []NMES                    []Other:  []w/US   []w/ice   []w/heat  Position:  Location:    []  Traction: [] Cervical       []Lumbar                       [] Prone          []Supine                       []Intermittent   []Continuous Lbs:  [] before manual  [] after manual   8 [x]  Ultrasound: []Continuous   [x] Pulsed                           []1MHz   [x]3MHz W/cm2: 1.3  Location: left anterior shoulder, deltoid    []  Iontophoresis with dexamethasone         Location: [] Take home patch   [] In clinic    []  Ice     []  heat  []  Ice massage  []  Laser   []  Anodyne Position:  Location:    []  Laser with stim  []  Other:  Position:  Location:    []  Vasopneumatic Device Pressure:       [] lo [] med [] hi   Temperature: [] lo [] med [] hi   [x] Skin assessment post-treatment:  [x]intact []redness- no adverse reaction []redness - adverse reaction:       27 min Therapeutic Exercise:  [] See flow sheet :   Rationale: increase ROM and increase strength to improve the patients ability to perform job tasks, lifting/carrying    With   [] TE   [] TA   [] neuro   [] other: Patient Education: [x] Review HEP    [] Progressed/Changed HEP based on:   [] positioning   [] body mechanics   [] transfers   [] heat/ice application    [] other:      Other Objective/Functional Measures: initiated ex program     Pain Level (0-10 scale) post treatment:  0    ASSESSMENT/Changes in Function: first follow-up after eval.      Patient will continue to benefit from skilled PT services to modify and progress therapeutic interventions, address functional mobility deficits, address ROM deficits, address strength deficits, analyze and address soft tissue restrictions, analyze and cue movement patterns, analyze and modify body mechanics/ergonomics, assess and modify postural abnormalities, address imbalance/dizziness and instruct in home and community integration to attain remaining goals. []  See Plan of Care  []  See progress note/recertification  []  See Discharge Summary         Progress towards goals / Updated goals:  Goal: Pt to resume HEP of aquatics 3x/week to improve mobility and reduce pain with movements. Status at last note/certification: has not tried yet  Long Term Goals: To be accomplished in 5 weeks:  Goal: Pt to increase left shoulder AROM to WNL without increased pain for ease with lifting and carrying items at home and work. Status at last note/certification: Flex 865 deg, ABD 90 deg, Ext 55 deg, IR 40 deg - with pain, ER 80 deg  Goal: Pt to report < 5/10 pain at worst to be able to pull up pants and wash her back without difficulty. Status at last note/certification: 84/07 pain at worst  Goal: Pt to report FOTO score of 66 pts to show improved function.   Status at last note/certification: FOTO 64 pts       PLAN  [x]  Upgrade activities as tolerated     []  Continue plan of care  []  Update interventions per flow sheet       []  Discharge due to:_  []  Other:_      Glez Coad, PTA 3/27/2018  9:41 AM    Future Appointments  Date Time Provider Nain Marshall   3/29/2018 4:00 PM Newton Jasmine [facility]   4/3/2018 10:00  [address] [facility]   4/5/2018 4:00 PM Newton Jasmine PTA [facility]   4/10/2018 10:00  [address] [facility]   4/12/2018 4:00 PM Wayne Rendon MD [address] [place] [address]   4/13/2018 4:00 PM Paco Palacios, PT [facility]   4/17/2018 10:00 AM Paco Palacios PT [facility]   4/19/2018 4:00 PM Katie Liming [facility]   6/13/2018 10:00 AM Daniel Alvarez MD [address] [place] [address]

## 2018-03-29 ENCOUNTER — HOSPITAL ENCOUNTER (OUTPATIENT)
Dept: PHYSICAL THERAPY | Age: 78
Discharge: HOME | End: 2018-03-29
Payer: MEDICARE

## 2018-03-29 PROCEDURE — G8986 CARRY D/C STATUS: HCPCS

## 2018-03-29 PROCEDURE — 97110 THERAPEUTIC EXERCISES: CPT

## 2018-03-29 PROCEDURE — G8985 CARRY GOAL STATUS: HCPCS

## 2018-03-29 NOTE — PROGRESS NOTES
PT DAILY TREATMENT NOTE - [facility]     Patient Name: Marta Ganser  Date:3/29/2018  : 1940  [x]  Patient  Verified  Payor: Eusebio Freeman / Plan: [place] MED[place]ARE PART A & B / Product Type: Medicare /    In time:4:05  Out time:4:25  Total Treatment Time (min): 20  Total Timed Codes (min): 20  1:1 Treatment Time ( [address] only): 20   Visit #: 3 of 10    Treatment Area: Pain in left shoulder [M25.512]  Contusion of left shoulder, initial encounter [S40.012A]  Bursitis of left shoulder [M75.52]    SUBJECTIVE  Pain Level (0-10 scale): 0/10  Any medication changes, allergies to medications, adverse drug reactions, diagnosis change, or new procedure performed?: [x] No    [] Yes (see summary sheet for update)  Subjective functional status/changes:   [] No changes reported  \"I feel good and want today to be my last day. \"    OBJECTIVE  20 min Therapeutic Exercise:  [] See flow sheet :   Rationale: increase ROM and increase strength to improve the patients ability to perform ADLs without difficulty. With   [] TE   [] TA   [] neuro   [] other: Patient Education: [x] Review HEP    [] Progressed/Changed HEP based on:   [] positioning   [] body mechanics   [] transfers   [] heat/ice application    [] other:      Other Objective/Functional Measures: Functional Gains: \"strength\"  Functional Deficits: \"need to get it in water\"  % improvement: 100%  Pain   Average: 0/10       Best: 0/10     Worst: 0/10  FOTO 69  Pain Level (0-10 scale) post treatment: 0/10    ASSESSMENT/Changes in Function: PD ex. Today, but requested to be D/C due to no functional deficits. []  See Plan of Care  []  See progress note/recertification  []  See Discharge Summary         Progress towards goals / Updated goals:  Goal: Pt to resume HEP of aquatics 3x/week to improve mobility and reduce pain with movements. Status at last note/certification: has not tried yet  Current: NOT MET.  Pt reports she will be taking time off of work to resume aquatic HEP.  Long Term Goals: To be accomplished in 5 weeks:  Goal: Pt to increase left shoulder AROM to WNL without increased pain for ease with lifting and carrying items at home and work. Status at last note/certification: Flex 111 deg, ABD 90 deg, Ext 55 deg, IR 40 deg - with pain, ER 80 deg  Current: MET. Flex 170 deg,  deg, Ext 60 deg, IR 60 deg,ER 85 deg all without pain. Goal: Pt to report < 5/10 pain at worst to be able to pull up pants and wash her back without difficulty. Status at last note/certification: 49/82 pain at worst  Current:MET. Pt reports no p! Goal: Pt to report FOTO score of 66 pts to show improved function. Status at last note/certification: FOTO 41 VOX  Current: MET.  FOTO 71    PLAN  []  Upgrade activities as tolerated     []  Continue plan of care  []  Update interventions per flow sheet       [x]  Discharge due to:_no functional defecits  []  Other:_      Mookie Galo PTA 3/29/2018  4:06 PM    Future Appointments  Date Time Provider Nain Marshall   4/3/2018 10:00   [address] [facility]   4/5/2018 4:00 PM Mookie Galo [facility]   4/10/2018 10:00 AM [facility]   4/12/2018 4:00 PM Luis Carlos Irizarry MD [address] [place] [address]   4/13/2018 4:00 PM Yariel Pena PT [facility] [place] [facility]   4/17/2018 10:00 AM Yariel Pena [facility]   4/19/2018 4:00 PM [facility]   6/13/2018 10:00 AM Emily Chung MD [address] [place] [address]

## 2018-04-03 ENCOUNTER — APPOINTMENT (OUTPATIENT)
Dept: PHYSICAL THERAPY | Age: 78
End: 2018-04-03

## 2018-04-04 NOTE — PROGRESS NOTES
[facility] - [facility], [address] [place] [address]  [phone] fax    Discharge Summary    Patient name: Margarito Almonte Start of Care: 3/20/2018   Referral source: Saray Corbett MD : 1940                         Medical Diagnosis: Pain in left shoulder [M25.512]  Contusion of left shoulder, initial encounter [S40.012A]  Bursitis of left shoulder [M75.52] Onset Date:3/9/18                         Treatment Diagnosis: Left Shoulder Pain   Prior Hospitalization: see medical history Provider#: [id number]   Medications: Verified on Patient summary List    Comorbidities: Anxiety; hx of Heart Attack; hx Right RCR; hearing impaired - hearing aids   Prior Level of Function: Works part time as  at [employer]; lives in [facility] alone; active individual    Visits from [place] of Care: 3    Missed Visits: 1    Reporting Period : 3/20/18 to 18    Short Term Goals: To be accomplished in 1 week:  Goal: Pt to resume HEP of aquatics 3x/week to improve mobility and reduce pain with movements. Status at last note/certification: has not tried yet  Current status: not met - Pt reports she will be taking time off of work to resume aquatic [place] be accomplished in 5 weeks:  Goal: Pt to increase left shoulder AROM to WNL without increased pain for ease with lifting and carrying items at home and work. Status at last note/certification: Flex 142 deg, ABD 90 deg, Ext 55 deg, IR 40 deg - with pain, ER 80 deg  Current status: met - Flex 170 deg,  deg, Ext 60 deg, IR 60 deg,ER 85 deg all without pain. Goal: Pt to report < 5/10 pain at worst to be able to pull up pants and wash her back without difficulty. Status at last note/certification: 80/72 pain at worst  Current status: met - 0/10 pain on average  Goal: Pt to report FOTO score of 66 pts to show improved function.   Status at last note/certification: FOTO 75 SANIYA  Current status: met - FOTO 69 pts    Assessment/ Summary of Care: Pt attended initial evaluation and two Rx sessions, then requested to be D/C as she had no pain and had resumed all functional tasks and job duties without issues. Pt reported the following at time of last visit: Functional Gains: \"strength\"; Functional Deficits: \"need to get it in water\";  % improvement: 100%. Pt has met all goals and is appropriate for D/C at this time. Thank you for this referral.     G-Codes (GP)  Carry    Goal  CJ= 20-39%   D/C  CJ= 20-39%    The severity rating is based on clinical judgment and the FOTO Score.       RECOMMENDATIONS:  [x]Discontinue therapy: [x]Patient has reached or is progressing toward set goals      []Patient is non-compliant or has abdicated      []Due to lack of appreciable progress towards set goals    Marc Schneider, PT 4/4/2018 10:05 AM

## 2018-04-05 ENCOUNTER — APPOINTMENT (OUTPATIENT)
Dept: PHYSICAL THERAPY | Age: 78
End: 2018-04-05

## 2018-04-10 ENCOUNTER — APPOINTMENT (OUTPATIENT)
Dept: PHYSICAL THERAPY | Age: 78
End: 2018-04-10

## 2018-04-12 ENCOUNTER — OFFICE VISIT (OUTPATIENT)
Dept: CARDIOLOGY CLINIC | Age: 78
End: 2018-04-12

## 2018-04-12 VITALS
OXYGEN SATURATION: 97 % | DIASTOLIC BLOOD PRESSURE: 64 MMHG | HEIGHT: 62 IN | WEIGHT: 157 LBS | HEART RATE: 78 BPM | BODY MASS INDEX: 28.89 KG/M2 | SYSTOLIC BLOOD PRESSURE: 118 MMHG

## 2018-04-12 DIAGNOSIS — E78.00 HYPERCHOLESTEREMIA: ICD-10-CM

## 2018-04-12 DIAGNOSIS — R94.31 ABNORMAL FINDING ON EKG: ICD-10-CM

## 2018-04-12 DIAGNOSIS — I25.10 CORONARY ARTERY DISEASE INVOLVING NATIVE CORONARY ARTERY OF NATIVE HEART, ANGINA PRESENCE UNSPECIFIED: ICD-10-CM

## 2018-04-12 DIAGNOSIS — R06.09 DOE (DYSPNEA ON EXERTION): Primary | ICD-10-CM

## 2018-04-12 DIAGNOSIS — I10 ESSENTIAL HYPERTENSION: ICD-10-CM

## 2018-04-12 DIAGNOSIS — Z95.1 S/P CABG X 3: ICD-10-CM

## 2018-04-12 NOTE — PROGRESS NOTES
1. Have you been to the ER, urgent care clinic since your last visit? Hospitalized since your last visit?no    2. Have you seen or consulted any other health care providers outside of the [address] [place] [address] since your last visit? Include any pap smears or colon screening.  no

## 2018-04-12 NOTE — PROGRESS NOTES
HISTORY OF PRESENT ILLNESS  Ar Henry is a 65 y.o. female. HPI     Patient presents for a follow-up an add-on office visit. She was previously followed by Dr. Dora Viera. She has a past medical history significant for coronary disease with a prior inferolateral myocardial infarction, found to have three-vessel coronary disease on cardiac catheterization in October 2015, subsequently undergoing three-vessel bypass surgery later that month using a LIMA to LAD, SVG to OM and SVG to distal RCA at [facility]. Patient underwent a follow-up pharmacologic nuclear stress test in November 2016 which was a low risk study demonstrating a prior inferior basilar infarct, EF 65%, no evidence of ischemia. The patient was last seen in the office 9 months ago. Since that time, she has noted increased fatigue and decreased activity tolerance especially over the past several months. She denies any chest pain or pressure, but has definitely noticed worsening dyspnea on exertion. She denies any leg swelling, no claudication, no orthopnea, no PND. She does complain of some discomfort of her sternal scar. Past Medical History:   Diagnosis Date    Abnormal finding on EKG 3/26/2014    s/o inf wall mi asymptomatic     Anxiety     Ataxic gait 10/21/2010    Breast cancer ([address] 05/2014    left side    CAD (coronary artery disease) 10/2015    LM normal, LAD mid 70%, OM1 diffuse 80%, RCA dominant with distal 99% on cardiac catheterization    Cardiac nuclear imaging test 11/29/2016    Low risk. Likely inferior basal prior injury. No ischemia. EF 65%. Mild inferobasal hypk.   Neg EKG on pharm stress test.   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  metoprolol succinate (TOPROL-XL) 25 mg XL tablet Take 1 Tab by mouth daily. 90 Tab 3    pitavastatin (LIVALO) 1 mg tab tablet Take 1 Tab by mouth daily. 90 Tab 3    bisacodyl (DULCOLAX) 5 mg EC tablet Take 4 tablets with 20 ounces of water 4 Tab 0    oxyCODONE-acetaminophen (PERCOCET) 5-325 mg per tablet Take 1-2 Tabs by mouth every four (4) hours as needed for Pain. Max Daily Amount: 12 Tabs. 40 Tab 0    meloxicam (MOBIC) 7.5 mg tablet Take 2 Tabs by mouth daily. 30 Tab 0    cholecalciferol, vitamin D3, (VITAMIN D3) 2,000 unit tab Take  by mouth daily.  CALCIUM PO Take 1,200 mg by mouth daily.  triamterene-hydroCHLOROthiazide (MAXZIDE) 75-50 mg per tablet Take 0.5 Tabs by mouth daily.  clonazePAM (KLONOPIN) 0.5 mg tablet 0.5 mg daily.  MULTIVITAMINS (MULTIVITAMIN PO) Take  by mouth daily. Allergies   Allergen Reactions    Latex Unable to Obtain    Norco [Hydrocodone-Acetaminophen] Swelling     Patient experienced throat swelling, hoarseness and difficulty swallowing.  Adhesive Hives    Codeine Nausea Only     Pt states she is not allergic. Social History   Substance Use Topics    Smoking status: Never Smoker    Smokeless tobacco: Never Used      Comment: quit age 26    Alcohol use Yes      Comment: occasional     Family History   Problem Relation Age of Onset    Cancer Mother 48     Breast   [facility] [place] Breast Cancer Mother 48    Heart Disease Father     Cancer Sister 76     Breast    Breast Cancer Sister 76    Heart Disease Brother     Diabetes Brother     Diabetes Sister          Review of Systems   Constitutional: Positive for malaise/fatigue.  Negative for chills, fever and weight loss. HENT: Negative for nosebleeds. Eyes: Negative for blurred vision and double vision. Respiratory: Positive for shortness of breath. Negative for cough and wheezing. Cardiovascular: Negative for chest pain, palpitations, orthopnea, claudication, leg swelling and PND. Gastrointestinal: Negative for abdominal pain, heartburn, nausea and vomiting. Genitourinary: Negative for dysuria and hematuria. Musculoskeletal: Negative for falls and myalgias. Skin: Negative for rash. Neurological: Negative for dizziness, focal weakness and headaches. Endo/Heme/Allergies: Does not bruise/bleed easily. Psychiatric/Behavioral: Negative for substance abuse. Visit Vitals    /64    Pulse 78    Ht 5' 2\" (1.575 m)    Wt 71.2 kg (157 lb)    SpO2 97%    BMI 28.72 kg/m2       Physical Exam   Constitutional: She is oriented to person, place, and time. She appears well-developed and well-nourished. HENT:   Head: Normocephalic and atraumatic. Eyes: Conjunctivae are normal.   Neck: Neck supple. No JVD present. Carotid bruit is not present. Cardiovascular: Normal rate, regular rhythm, S1 normal, S2 normal and normal pulses. Exam reveals distant heart sounds. Exam reveals no gallop. No murmur heard. Pulmonary/Chest: Effort normal. She has decreased breath sounds. She has no wheezes. She has no rales. Abdominal: Soft. Bowel sounds are normal. There is no tenderness. Musculoskeletal: She exhibits no edema. Neurological: She is alert and oriented to person, place, and time. Skin: Skin is warm and dry. EKG: Normal sinus rhythm, normal axis, small inferior Q waves, consistent with prior infarct, early RS transition, cannot exclude prior posterior infarct, no ST or T-wave abnormalities concerning for ischemia. Normal QTc interval.  No change compared to the previous EKG. ASSESSMENT and PLAN  Encounter Diagnoses   Name Primary?     RODRIGUEZ (dyspnea on exertion) Yes    Coronary artery disease involving native coronary artery of native heart, angina presence unspecified     Abnormal finding on EKG     Essential hypertension     Hypercholesteremia     S/P CABG x 3      Dyspnea on exertion. Given the fairly recent onset, I am concerned for an anginal equivalent, so I recommended a pharmacologic nuclear stress test to evaluate for any new ischemic heart disease. I have also recommended an echocardiogram to evaluate her overall LV function and assess for any valvular heart disease. Coronary artery disease. Status post three-vessel bypass surgery in October 2015 using a LIMA to LAD, SVG to OM, and SVG to distal RCA. She last underwent a stress test in November 2016 which is a low risk study. A repeat stress test will be arranged as described above. Essential hypertension. Patient blood pressure appears reasonable on her current medical regimen, which I would continue. Dyslipidemia. Patient has been [place], and this is followed closely by her PCP. Her LDL should be less than 70 from a cardiac standpoint. Follow-up in 3-4 months, sooner if needed.

## 2018-04-13 ENCOUNTER — APPOINTMENT (OUTPATIENT)
Dept: PHYSICAL THERAPY | Age: 78
End: 2018-04-13

## 2018-04-17 ENCOUNTER — APPOINTMENT (OUTPATIENT)
Dept: PHYSICAL THERAPY | Age: 78
End: 2018-04-17

## 2018-04-19 ENCOUNTER — APPOINTMENT (OUTPATIENT)
Dept: PHYSICAL THERAPY | Age: 78
End: 2018-04-19

## 2018-04-24 ENCOUNTER — HOSPITAL ENCOUNTER (OUTPATIENT)
Dept: NON INVASIVE DIAGNOSTICS | Age: 78
Discharge: HOME | End: 2018-04-24
Attending: INTERNAL MEDICINE
Payer: MEDICARE

## 2018-04-24 DIAGNOSIS — I25.10 CORONARY ARTERY DISEASE INVOLVING NATIVE CORONARY ARTERY OF NATIVE HEART, ANGINA PRESENCE UNSPECIFIED: ICD-10-CM

## 2018-04-24 DIAGNOSIS — R94.31 ABNORMAL FINDING ON EKG: ICD-10-CM

## 2018-04-24 PROCEDURE — 78452 HT MUSCLE IMAGE SPECT MULT: CPT | Performed by: INTERNAL MEDICINE

## 2018-04-24 PROCEDURE — 93017 CV STRESS TEST TRACING ONLY: CPT | Performed by: INTERNAL MEDICINE

## 2018-04-24 PROCEDURE — C8929 TTE W OR WO FOL WCON,DOPPLER: HCPCS

## 2018-04-24 PROCEDURE — 74011250636 HC RX REV CODE- 250/636: Performed by: INTERNAL MEDICINE

## 2018-04-24 PROCEDURE — A9500 TC99M SESTAMIBI: HCPCS

## 2018-04-24 PROCEDURE — 74011000258 HC RX REV CODE- 258: Performed by: INTERNAL MEDICINE

## 2018-04-24 RX ORDER — SODIUM CHLORIDE 9 MG/ML
100 INJECTION, SOLUTION INTRAVENOUS ONCE
Status: COMPLETED | OUTPATIENT
Start: 2018-04-24 | End: 2018-04-24

## 2018-04-24 RX ADMIN — PERFLUTREN 2 ML: 6.52 INJECTION, SUSPENSION INTRAVENOUS at 09:00

## 2018-04-24 RX ADMIN — SODIUM CHLORIDE 100 ML/HR: 900 INJECTION, SOLUTION INTRAVENOUS at 08:30

## 2018-04-24 RX ADMIN — REGADENOSON 0.4 MG: 0.08 INJECTION, SOLUTION INTRAVENOUS at 08:30

## 2018-04-24 NOTE — PROGRESS NOTES
Patient was given 10.46 milliCuries of 99mTc-Sestamibi for the resting images. Patient was also given 33.0 milliCuries of 99mTc-Sestamibi for the stress images. Injected 0.4mg Lexiscan while slowly walking on treadmill. Patient's armband was discarded and shredded.

## 2018-04-25 NOTE — PROCEDURES
[facility] STRESS    Name:SUSY ROBB  MR#: [id number]  : 1940  ACCOUNT #: [de-identified]   DATE OF SERVICE: 2018    ORDERING PHYSICIAN:  Dr. Lourdes Villanueva. PRIMARY CARE PHYSICIAN:  Dr. Torres Hind:  Chronic coronary artery disease. ELECTROCARDIOGRAM:  Resting demonstrated normal sinus rhythm with rate 70 and was within normal limits. DESCRIPTION OF PROCEDURE:  Patient received 10.46 mCi of technetium 99m sestamibi intravenously via a right arm IV and had a resting myocardial perfusion study completed. She subsequently received Lexiscan 0.4 mg intravenously followed by 5 mL saline flush and was asked to walk at 0.8 miles an hour at 0 grade for 3 minutes. She complained of weakness and shortness of breath before Lexiscan administration, but had no chest pain or significant electrocardiographic changes. She subsequently received 33 mCi of technetium 99m sestamibi intravenously and had a stress myocardial perfusion study completed and compared with the resting study. FINDINGS:  There appeared to be normal perfusion throughout the left ventricular myocardium without clear cut signs of ischemia or infarction. Gated SPECT imaging demonstrated normal left ventricular volumes with very mild septal hypokinesia, probably related to past open heart surgery and minimal basal inferior wall hypokinesia, which may be within normal limits with normal overall left ventricular function of 64%. There is transient ischemic dilatation index at 1.03, which is still normal for a pharmacologic myocardial perfusion study. SUMMARY:  1. Normal and low risk Lexiscan and Cardiolite myocardial perfusion study. 2.  Normal perfusion throughout the left ventricular myocardium without signs of ischemia or infarction.   3.  Low normal left ventricular volumes with mild septal hypokinesia and very mild proximal inferior wall hypokinesia with normal overall left ventricular function estimated at 64%. 4. In comparison to the report of the study of 11/29/2016 there was felt to be some diminished uptake at the inferior base which was only minimally suggested on this study and was felt to be either from diaphragmatic soft tissue attenuation or past small inferior infarction, which is only very minimally present on the current study and felt to be within normal limits. The overall left ventricular function appeared to be approximately the same in both studies with similar wall motion abnormalities with ejection fraction 65% of that time of 64% on this study.       DO RUBINA Centeno / [place]  D: 04/24/2018 18:03     T: 04/24/2018 20:19  JOB #: [id number]

## 2018-04-25 NOTE — PROCEDURES
[facility] STRESS    Name:SUSY ROBB  MR#: [id number]  : 1940  ACCOUNT #: [de-identified]   DATE OF SERVICE: 2018    ORDERING PHYSICIAN:  Dr. Ruba Salcedo. PRIMARY CARE PHYSICIAN:  Dr. Ramos Puls:  Chronic coronary artery disease. ELECTROCARDIOGRAM:  Resting demonstrated normal sinus rhythm with rate 70 and was within normal limits. DESCRIPTION OF PROCEDURE:  Patient received 10.46 mCi of technetium 99m sestamibi intravenously via a right arm IV and had a resting myocardial perfusion study completed. She subsequently received Lexiscan 0.4 mg intravenously followed by 5 mL saline flush and was asked to walk at 0.8 miles an hour at 0 grade for 3 minutes. She complained of weakness and shortness of breath before Lexiscan administration, but had no chest pain or significant electrocardiographic changes. She subsequently received 33 mCi of technetium 99m sestamibi intravenously and had a stress myocardial perfusion study completed and compared with the resting study. FINDINGS:  There appeared to be normal perfusion throughout the left ventricular myocardium without clear cut signs of ischemia or infarction. Gated SPECT imaging demonstrated normal left ventricular volumes with very mild septal hypokinesia, probably related to past open heart surgery and minimal basal inferior wall hypokinesia, which may be within normal limits with normal overall left ventricular function of 64%. There is transient ischemic dilatation index at 1.03, which is still normal for a pharmacologic myocardial perfusion study. SUMMARY:  1. Normal and low risk Lexiscan and Cardiolite myocardial perfusion study. 2.  Normal perfusion throughout the left ventricular myocardium without signs of ischemia or infarction.   3.  Low normal left ventricular volumes with mild septal hypokinesia and very mild proximal inferior wall hypokinesia with normal overall left ventricular function estimated at 64%. 4. In comparison to the report of the study of 11/29/2016 there was felt to be some diminished uptake at the inferior base which was only minimally suggested on this study and was felt to be either from diaphragmatic soft tissue attenuation or past small inferior infarction, which is only very minimally present on the current study and felt to be within normal limits. The overall left ventricular function appeared to be approximately the same in both studies with similar wall motion abnormalities with ejection fraction 65% of that time of 64% on this study.       DO RUBINA Kay / [place]  D: 04/24/2018 18:03     T: 04/24/2018 20:19  JOB #: [id number]

## 2018-04-27 LAB
ATTENDING PHYSICIAN, CST07: NORMAL
DIAGNOSIS, 93000: NORMAL
DUKE TM SCORE RESULT, CST14: NORMAL
DUKE TREADMILL SCORE, CST13: NORMAL
ECG INTERP BEFORE EX, CST11: NORMAL
ECG INTERP DURING EX, CST12: NORMAL
FUNCTIONAL CAPACITY, CST17: NORMAL
KNOWN CARDIAC CONDITION, CST08: NORMAL
MAX. DIASTOLIC BP, CST04: 70 MMHG
MAX. HEART RATE, CST05: 100 BPM
MAX. SYSTOLIC BP, CST03: 142 MMHG
OVERALL BP RESPONSE TO EXERCISE, CST16: NORMAL
OVERALL HR RESPONSE TO EXERCISE, CST15: NORMAL
PEAK EX METS, CST10: 1.5 METS
PROTOCOL NAME, CST01: NORMAL
TEST INDICATION, CST09: NORMAL

## 2018-05-02 NOTE — PROGRESS NOTES
Per your last note \" Dyspnea on exertion. Given the fairly recent onset, I am concerned for an anginal equivalent, so I recommended a pharmacologic nuclear stress test to evaluate for any new ischemic heart disease. I have also recommended an echocardiogram to evaluate her overall LV function and assess for any valvular heart disease.

## 2018-05-03 ENCOUNTER — TELEPHONE (OUTPATIENT)
Dept: CARDIOLOGY CLINIC | Age: 78
End: 2018-05-03

## 2018-05-03 NOTE — TELEPHONE ENCOUNTER
----- Message from Donny Carrasquillo MD sent at 5/2/2018  9:02 AM EDT -----  Please let the patient know that her echocardiogram was unremarkable with exception of a mild leaky aortic valve. I am also awaiting the results of her stress test  ----- Message -----     From: Noé Castaneda RN     Sent: 5/2/2018   8:36 AM       To: Donny Carrasquillo MD    Per your last note \" Dyspnea on exertion. Given the fairly recent onset, I am concerned for an anginal equivalent, so I recommended a pharmacologic nuclear stress test to evaluate for any new ischemic heart disease. I have also recommended an echocardiogram to evaluate her overall LV function and assess for any valvular heart disease.     Stress test WNL also

## 2018-05-03 NOTE — LETTER
6/6/2018 11:07 AM 
 
Ms. Joyner Aus 
[address] [address] [address] Dear Ms. Irina, We have been unable to reach you by phone to notify you of your test results. Please call our office at [phone] and ask to speak with my nurse in order to explain these results to you and advise you of any recommendations. Sincerely, Negrito Schulte MD

## 2018-06-11 DIAGNOSIS — N63.10 MASS OF RIGHT BREAST: Primary | ICD-10-CM

## 2018-06-11 DIAGNOSIS — Z98.890 HX OF RIGHT BREAST BIOPSY: ICD-10-CM

## 2018-06-11 NOTE — TELEPHONE ENCOUNTER
Patient aware of results. This has been fully explained to the patient, who indicates understanding.

## 2018-06-14 DIAGNOSIS — Z85.3 HX OF BREAST CANCER: Primary | ICD-10-CM

## 2018-07-11 ENCOUNTER — HOSPITAL ENCOUNTER (OUTPATIENT)
Dept: MAMMOGRAPHY | Age: 78
Discharge: HOME | End: 2018-07-11
Attending: SURGERY
Payer: MEDICARE

## 2018-07-11 DIAGNOSIS — Z85.3 HX OF BREAST CANCER: ICD-10-CM

## 2018-07-11 PROCEDURE — 77061 BREAST TOMOSYNTHESIS UNI: CPT

## 2018-07-12 ENCOUNTER — OFFICE VISIT (OUTPATIENT)
Dept: SURGERY | Age: 78
End: 2018-07-12

## 2018-07-12 VITALS
SYSTOLIC BLOOD PRESSURE: 124 MMHG | WEIGHT: 158 LBS | DIASTOLIC BLOOD PRESSURE: 64 MMHG | RESPIRATION RATE: 18 BRPM | HEIGHT: 62 IN | BODY MASS INDEX: 29.08 KG/M2 | HEART RATE: 81 BPM | TEMPERATURE: 97.7 F

## 2018-07-12 DIAGNOSIS — Z98.890 H/O BENIGN BREAST BIOPSY: ICD-10-CM

## 2018-07-12 DIAGNOSIS — R92.8 CATEGORY 3 MAMMOGRAPHY RESULT WITH SHORT FOLLOW-UP INTERVAL SUGGESTED FOR PROBABLY BENIGN FINDING: ICD-10-CM

## 2018-07-12 DIAGNOSIS — N63.21 MASS OF UPPER OUTER QUADRANT OF LEFT BREAST: ICD-10-CM

## 2018-07-12 DIAGNOSIS — Z80.3 FAMILY HISTORY OF BREAST CANCER IN MOTHER: Primary | ICD-10-CM

## 2018-07-12 NOTE — PROGRESS NOTES
1. Have you been to the ER, urgent care clinic since your last visit? Hospitalized since your last visit? No    2. Have you seen or consulted any other health care providers outside of the [address] [place] [address] since your last visit? Include any pap smears or colon screening.  Yes Where: pcp

## 2018-07-12 NOTE — PATIENT INSTRUCTIONS
Breast Lumps: Care Instructions  Your Care Instructions  Breast lumps are common, especially in women between ages 27 and 48. Many women's breasts feel lumpy and tender before their menstrual period. Women also may have lumps when they are breastfeeding. Breast lumps may go away after menopause. All new breast lumps in women after menopause should be checked by a doctor. Although lumps may be normal for you, it is important to have your doctor check any lump or thickness that is not like the rest of your breast to make sure it is not cancer. A lump may be larger, harder, or different from the rest of your breast tissue. Follow-up care is a key part of your treatment and safety. Be sure to make and go to all appointments, and call your doctor if you are having problems. It's also a good idea to know your test results and keep a list of the medicines you take. How can you care for yourself at home? · Make an appointment to have a mammogram and other follow-up visits as recommended by your doctor. When should you call for help? Watch closely for changes in your health, and be sure to contact your doctor if:    · You do not get better as expected.     · Your breast has changed.     · You have pain in your breast.     · You have a discharge from your nipple.     · A breast lump changes or does not go away. Where can you learn more? Go to http://obinna-luda.info/. Enter A294 in the search box to learn more about \"Breast Lumps: Care Instructions. \"  Current as of: October 6, 2017  Content Version: 11.7  © 2997-9077 [employer]. Care instructions adapted under license by [employer] (which disclaims liability or warranty for this information). If you have questions about a medical condition or this instruction, always ask your healthcare professional. [address] any warranty or liability for your use of this information.

## 2018-07-12 NOTE — PROGRESS NOTES
Marilee Chin. SHAISTA Arellano-MEENA  PROGRESS NOTE    Subjective:    Ms. Brain Bland is a very pleasant 64 yo white female who presents today for 6 month follow up for breast exam following most recent BIRADS 3 right mammogram done 7/11/18. 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 She is currently on steroids for a \"viral infection of her joints\". She says when she comes off the steroids, all of her joints become extremely painful. She is waiting to see a rheumatologist for further evaluation. She does have a history of left, invasive, ductal carcinoma in 2011 and multiple, bilateral biopsies. Objective:    Vitals:    07/12/18 1506   BP: 124/64   Pulse: 81   Resp: 18   Temp: 97.7 °F (36.5 °C)   Weight: 71.7 kg (158 lb)   Height: 5' 2\" (1.575 m)       Physical Exam:    General: Alert and oriented x 3, cooperative, in no apparent distress Breasts:    Left:  no dimpling, discoloration, nipple inversion or retractions. no axillary or supraclavicular lymphadenopathy. Approx 5cm mass palpated in the 3:00 position starting 2cm from nipple. Right:  no dimpling, discoloration, nipple inversion or retractions. No axillary or supraclavicular lymphadenopathy. no mass      Current Medications:  Current Outpatient Prescriptions   Medication Sig Dispense Refill    aspirin 81 mg chewable tablet Take 81 mg by mouth daily.  metoprolol succinate (TOPROL-XL) 25 mg XL tablet Take 1 Tab by mouth daily. 90 Tab 3    pitavastatin (LIVALO) 1 mg tab tablet Take 1 Tab by mouth daily. 90 Tab 3    cholecalciferol, vitamin D3, (VITAMIN D3) 2,000 unit tab Take  by mouth daily.  CALCIUM PO Take 1,200 mg by mouth daily.  triamterene-hydroCHLOROthiazide (MAXZIDE) 75-50 mg per tablet Take 0.5 Tabs by mouth daily.  clonazePAM (KLONOPIN) 0.5 mg tablet 0.5 mg daily.  MULTIVITAMINS (MULTIVITAMIN PO) Take  by mouth daily.       bisacodyl (DULCOLAX) 5 mg EC tablet Take 4 tablets with 20 ounces of water 4 Tab 0    oxyCODONE-acetaminophen (PERCOCET) 5-325 mg per tablet Take 1-2 Tabs by mouth every four (4) hours as needed for Pain. Max Daily Amount: 12 Tabs. 40 Tab 0    meloxicam (MOBIC) 7.5 mg tablet Take 2 Tabs by mouth daily. 30 Tab 0       Chart and notes reviewed. Data reviewed. I have evaluated and examined the patient. Impression:  · Patient with a personal history of left breast cancer and most recently a right, benign breast biopsy in December 2017 here today for short interval follow up. She has a right BIRADS 3 mammogram today and a palpable, left breast mass upon exam.     Plan:   · Dx left U/S now  · Will follow up after U/S    Ms. Alexander Sanches has a reminder for a \"due or due soon\" health maintenance. I have asked that she contact her primary care provider for follow-up on this health maintenance. Марина Ruggiero.  Bibi Markham, SHAISTA-C

## 2018-07-17 ENCOUNTER — HOSPITAL ENCOUNTER (OUTPATIENT)
Dept: MAMMOGRAPHY | Age: 78
Discharge: HOME | End: 2018-07-17
Attending: NURSE PRACTITIONER

## 2018-07-17 DIAGNOSIS — N63.21 MASS OF UPPER OUTER QUADRANT OF LEFT BREAST: ICD-10-CM

## 2018-07-17 DIAGNOSIS — N63.21 MASS OF UPPER OUTER QUADRANT OF LEFT BREAST: Primary | ICD-10-CM

## 2018-08-23 ENCOUNTER — HOSPITAL ENCOUNTER (OUTPATIENT)
Dept: MAMMOGRAPHY | Age: 78
Discharge: HOME | End: 2018-08-23
Attending: NURSE PRACTITIONER
Payer: MEDICARE

## 2018-08-23 ENCOUNTER — HOSPITAL ENCOUNTER (OUTPATIENT)
Dept: ULTRASOUND IMAGING | Age: 78
Discharge: HOME | End: 2018-08-23
Attending: NURSE PRACTITIONER
Payer: MEDICARE

## 2018-08-23 PROCEDURE — 77061 BREAST TOMOSYNTHESIS UNI: CPT

## 2018-08-23 PROCEDURE — 76642 ULTRASOUND BREAST LIMITED: CPT

## 2018-08-24 DIAGNOSIS — Z12.39 SCREENING BREAST EXAMINATION: Primary | ICD-10-CM

## 2018-10-04 ENCOUNTER — HOSPITAL ENCOUNTER (OUTPATIENT)
Dept: LAB | Age: 78
Discharge: HOME | End: 2018-10-04
Payer: MEDICARE

## 2018-10-04 DIAGNOSIS — Z01.818 PRE-OP EVALUATION: ICD-10-CM

## 2018-10-04 LAB
ANION GAP SERPL CALC-SCNC: 9 MMOL/L (ref 3–18)
ATRIAL RATE: 71 BPM
BUN SERPL-MCNC: 22 MG/DL (ref 7–18)
BUN/CREAT SERPL: 26 (ref 12–20)
CALCIUM SERPL-MCNC: 9 MG/DL (ref 8.5–10.1)
CALCULATED P AXIS, ECG09: 14 DEGREES
CALCULATED R AXIS, ECG10: -5 DEGREES
CALCULATED T AXIS, ECG11: 41 DEGREES
CHLORIDE SERPL-SCNC: 103 MMOL/L (ref 100–108)
CO2 SERPL-SCNC: 28 MMOL/L (ref 21–32)
CREAT SERPL-MCNC: 0.86 MG/DL (ref 0.6–1.3)
DIAGNOSIS, 93000: NORMAL
GLUCOSE SERPL-MCNC: 107 MG/DL (ref 74–99)
P-R INTERVAL, ECG05: 178 MS
POTASSIUM SERPL-SCNC: 3.9 MMOL/L (ref 3.5–5.5)
Q-T INTERVAL, ECG07: 418 MS
QRS DURATION, ECG06: 66 MS
QTC CALCULATION (BEZET), ECG08: 454 MS
SODIUM SERPL-SCNC: 140 MMOL/L (ref 136–145)
VENTRICULAR RATE, ECG03: 71 BPM

## 2018-10-04 PROCEDURE — 36415 COLL VENOUS BLD VENIPUNCTURE: CPT | Performed by: PLASTIC SURGERY

## 2018-10-04 PROCEDURE — 93005 ELECTROCARDIOGRAM TRACING: CPT

## 2018-10-04 PROCEDURE — 80048 BASIC METABOLIC PNL TOTAL CA: CPT | Performed by: PLASTIC SURGERY

## 2018-10-09 ENCOUNTER — HOSPITAL ENCOUNTER (OUTPATIENT)
Dept: LAB | Age: 78
Discharge: HOME | End: 2018-10-09
Payer: MEDICARE

## 2018-10-09 PROCEDURE — 88304 TISSUE EXAM BY PATHOLOGIST: CPT

## 2018-10-09 PROCEDURE — 88331 PATH CONSLTJ SURG 1 BLK 1SPC: CPT | Performed by: PLASTIC SURGERY

## 2018-10-09 PROCEDURE — 88341 IMHCHEM/IMCYTCHM EA ADD ANTB: CPT | Performed by: PLASTIC SURGERY

## 2018-10-09 PROCEDURE — 88305 TISSUE EXAM BY PATHOLOGIST: CPT | Performed by: PLASTIC SURGERY

## 2018-10-09 PROCEDURE — 88342 IMHCHEM/IMCYTCHM 1ST ANTB: CPT | Performed by: PLASTIC SURGERY

## 2018-10-18 ENCOUNTER — OFFICE VISIT (OUTPATIENT)
Dept: CARDIOLOGY CLINIC | Age: 78
End: 2018-10-18

## 2018-10-18 VITALS
OXYGEN SATURATION: 96 % | SYSTOLIC BLOOD PRESSURE: 124 MMHG | BODY MASS INDEX: 30.73 KG/M2 | HEIGHT: 62 IN | HEART RATE: 86 BPM | DIASTOLIC BLOOD PRESSURE: 78 MMHG | WEIGHT: 167 LBS

## 2018-10-18 DIAGNOSIS — I25.10 CORONARY ARTERY DISEASE INVOLVING NATIVE CORONARY ARTERY OF NATIVE HEART WITHOUT ANGINA PECTORIS: Primary | ICD-10-CM

## 2018-10-18 DIAGNOSIS — I10 ESSENTIAL HYPERTENSION: ICD-10-CM

## 2018-10-18 DIAGNOSIS — E78.00 HYPERCHOLESTEREMIA: ICD-10-CM

## 2018-10-18 DIAGNOSIS — Z95.1 S/P CABG X 3: ICD-10-CM

## 2018-10-18 NOTE — PROGRESS NOTES
HISTORY OF PRESENT ILLNESS  Maci Gifford is a 77 y.o. female. Follow-up   Pertinent negatives include no chest pain, no abdominal pain, no headaches and no shortness of breath. Patient presents for a follow-up office visit. She was previously followed by Dr. Stevie Canales. She has a past medical history significant for coronary disease with a prior inferolateral myocardial infarction, found to have three-vessel coronary disease on cardiac catheterization in October 2015, subsequently undergoing three-vessel bypass surgery later that month using a LIMA to LAD, SVG to OM and SVG to distal RCA at [facility]. Patient last underwent noninvasive cardiac imaging in April 2018 including a pharmacologic nuclear stress test and an echocardiogram her stress test was negative for ischemia or infarction, EF 64%. Her echocardiogram demonstrated preserved LV systolic function, grade 1 diastolic dysfunction, a sclerotic aortic valve without stenosis with mild to moderate aortic insufficiency. She was last seen in our office 6 months ago. Since that time, she had extensive plastic surgery to remove what sounds like a neuroma on her wrist and also skin cancer of her nose requiring a skin graft from her ear. As result she has been off of her aspirin until all of her incisions healed. She still has sutures. She denies any recurrent chest pain or shortness of breath. No major change in her activity tolerance. No leg swelling, orthopnea, or PND. Past Medical History:   Diagnosis Date    Abnormal finding on EKG 3/26/2014    s/o inf wall mi asymptomatic     Anxiety     Ataxic gait 10/21/2010    Breast cancer ([facility] [address] 05/2014    left side    CAD (coronary artery disease) 10/2015    LM normal, LAD mid 70%, OM1 diffuse 80%, RCA dominant with distal 99% on cardiac catheterization    Cardiac nuclear imaging test 11/29/2016    Low risk. Likely inferior basal prior injury. No ischemia. EF 65%. Mild inferobasal hypk. Neg EKG on pharm stress test.   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 90 Tab 3    pitavastatin (LIVALO) 1 mg tab tablet Take 1 Tab by mouth daily. 90 Tab 3    oxyCODONE-acetaminophen (PERCOCET) 5-325 mg per tablet Take 1-2 Tabs by mouth every four (4) hours as needed for Pain. Max Daily Amount: 12 Tabs. 40 Tab 0    meloxicam (MOBIC) 7.5 mg tablet Take 2 Tabs by mouth daily. 30 Tab 0    triamterene-hydroCHLOROthiazide (MAXZIDE) 75-50 mg per tablet Take 0.5 Tabs by mouth daily.  MULTIVITAMINS (MULTIVITAMIN PO) Take  by mouth daily. Allergies   Allergen Reactions    Latex Unable to Obtain    Norco [Hydrocodone-Acetaminophen] Swelling     Patient experienced throat swelling, hoarseness and difficulty swallowing.  Adhesive Hives    Codeine Nausea Only     Pt states she is not allergic.       Social History     Tobacco Use    Smoking status: Never Smoker    Smokeless tobacco: Never Used    Tobacco comment: quit age 26   Substance Use Topics    Alcohol use: Yes     Comment: occasional    Drug use: No     Family History   Problem Relation Age of Onset   Rd.Neel Cancer Mother 48        Breast   Sultana.Neel Breast Cancer Mother 48    Heart Disease Father     Cancer Sister 76        Breast    Breast Cancer Sister 76    Heart Disease Brother     Diabetes Brother     Diabetes Sister          Review of Systems Constitutional: Negative for chills, fever, malaise/fatigue and weight loss. HENT: Negative for nosebleeds. Eyes: Negative for blurred vision and double vision. Respiratory: Negative for cough, shortness of breath and wheezing. Cardiovascular: Negative for chest pain, palpitations, orthopnea, claudication, leg swelling and PND. Gastrointestinal: Negative for abdominal pain, heartburn, nausea and vomiting. Genitourinary: Negative for dysuria and hematuria. Musculoskeletal: Negative for falls and myalgias. Skin: Negative for rash. Neurological: Negative for dizziness, focal weakness and headaches. Endo/Heme/Allergies: Does not bruise/bleed easily. Psychiatric/Behavioral: Negative for substance abuse. Visit Vitals  /78   Pulse 86   Ht 5' 2\" (1.575 m)   Wt 75.8 kg (167 lb)   SpO2 96%   BMI 30.54 kg/m²       Physical Exam   Constitutional: She is oriented to person, place, and time. She appears well-developed and well-nourished. HENT:   Head: Normocephalic and atraumatic. Eyes: Conjunctivae are normal.   Neck: Neck supple. No JVD present. Carotid bruit is not present. Cardiovascular: Normal rate, regular rhythm, S1 normal, S2 normal and normal pulses. Exam reveals distant heart sounds. Exam reveals no gallop. No murmur heard. Pulmonary/Chest: Effort normal. She has no decreased breath sounds. She has no wheezes. She has no rhonchi. She has no rales. Abdominal: Soft. Bowel sounds are normal. There is no tenderness. Musculoskeletal: She exhibits no edema. Neurological: She is alert and oriented to person, place, and time. Skin: Skin is warm and dry. October 4, 2018 EKG: Normal sinus rhythm, normal axis,  inferior Q waves, consistent with prior infarct, early RS transition, cannot exclude prior posterior infarct, no ST or T-wave abnormalities concerning for ischemia. Normal QTc interval.  No change compared to the previous EKG.     ASSESSMENT and PLAN    Coronary artery disease. Status post three-vessel bypass surgery in October 2015 using a LIMA to LAD, SVG to OM, and SVG to distal RCA. She last underwent a pharmacological nuclear stress test in April 2018 which is a low risk study. No new symptoms concerning for angina. She is currently taking a beta-blocker and a statin. Her aspirin is on hold currently with her recent plastic surgery. When she follows up with her plastic surgeon, recommend she restart aspirin 81 mg daily. Essential hypertension. Patient blood pressure appears reasonable on her current medical regimen, which I would continue. Dyslipidemia. Patient has been L[place], and this is followed closely by her PCP. Her LDL should be less than 70 from a cardiac standpoint. Follow-up in 6 months, sooner if needed.

## 2018-10-18 NOTE — PROGRESS NOTES
Gentry Phan presents today for   Chief Complaint   Patient presents with    Follow-up     6 month - no cardiac complaints       Gentry Phan preferred language for health care discussion is english/other. Is someone accompanying this pt? No    Is the patient using any DME equipment during OV? No    Depression Screening:  PHQ over the last two weeks 3/16/2018   PHQ Not Done Patient Decline   Little interest or pleasure in doing things -   Feeling down, depressed, irritable, or hopeless -   Total Score PHQ 2 -       Learning Assessment:  Learning Assessment 2/22/2017   PRIMARY LEARNER Patient   HIGHEST LEVEL OF EDUCATION - PRIMARY LEARNER  -   BARRIERS PRIMARY LEARNER NONE   PRIMARY LANGUAGE ENGLISH   LEARNER PREFERENCE PRIMARY DEMONSTRATION     -     -     -   ANSWERED BY self   RELATIONSHIP SELF       Abuse Screening:  Abuse Screening Questionnaire 3/28/2014   Do you ever feel afraid of your partner? N   Are you in a relationship with someone who physically or mentally threatens you? N   Is it safe for you to go home? Y       Fall Risk  Fall Risk Assessment, last 12 mths 3/16/2018   Able to walk? Yes   Fall in past 12 months? Yes   Fall with injury? Yes   Number of falls in past 12 months 1   Fall Risk Score 2       Pt currently taking Antiplatelet therapy? Aspirin     Coordination of Care:  1. Have you been to the ER, urgent care clinic since your last visit? Hospitalized since your last visit? No    2. Have you seen or consulted any other health care providers outside of the [facility] since your last visit? Include any pap smears or colon screening.  No

## 2019-02-25 RX ORDER — QUETIAPINE FUMARATE 25 MG/1
25 TABLET, FILM COATED ORAL
COMMUNITY

## 2019-02-25 RX ORDER — PREDNISONE 1 MG/1
4 TABLET ORAL DAILY
COMMUNITY
End: 2020-08-28

## 2019-02-28 ENCOUNTER — ANESTHESIA EVENT (OUTPATIENT)
Dept: ENDOSCOPY | Age: 79
End: 2019-02-28
Payer: MEDICARE

## 2019-03-01 ENCOUNTER — ANESTHESIA (OUTPATIENT)
Dept: ENDOSCOPY | Age: 79
End: 2019-03-01
Payer: MEDICARE

## 2019-03-01 ENCOUNTER — HOSPITAL ENCOUNTER (OUTPATIENT)
Age: 79
Setting detail: OUTPATIENT SURGERY
Discharge: HOME | End: 2019-03-01
Attending: INTERNAL MEDICINE | Admitting: INTERNAL MEDICINE
Payer: MEDICARE

## 2019-03-01 VITALS
DIASTOLIC BLOOD PRESSURE: 83 MMHG | TEMPERATURE: 98.3 F | HEART RATE: 70 BPM | OXYGEN SATURATION: 99 % | SYSTOLIC BLOOD PRESSURE: 138 MMHG | RESPIRATION RATE: 20 BRPM | WEIGHT: 166.6 LBS | HEIGHT: 63 IN | BODY MASS INDEX: 29.52 KG/M2

## 2019-03-01 LAB
BUN BLD-MCNC: 28 MG/DL (ref 7–18)
CHLORIDE BLD-SCNC: 106 MMOL/L (ref 100–108)
GLUCOSE BLD STRIP.AUTO-MCNC: 88 MG/DL (ref 70–110)
GLUCOSE BLD STRIP.AUTO-MCNC: 89 MG/DL (ref 74–106)
HCT VFR BLD CALC: 42 % (ref 36–49)
HGB BLD-MCNC: 14.3 G/DL (ref 12–16)
POTASSIUM BLD-SCNC: 4 MMOL/L (ref 3.5–5.5)
SODIUM BLD-SCNC: 142 MMOL/L (ref 136–145)

## 2019-03-01 PROCEDURE — 82962 GLUCOSE BLOOD TEST: CPT

## 2019-03-01 PROCEDURE — 74011250636 HC RX REV CODE- 250/636

## 2019-03-01 PROCEDURE — 74011000250 HC RX REV CODE- 250: Performed by: NURSE ANESTHETIST, CERTIFIED REGISTERED

## 2019-03-01 PROCEDURE — 76040000007: Performed by: INTERNAL MEDICINE

## 2019-03-01 PROCEDURE — 82947 ASSAY GLUCOSE BLOOD QUANT: CPT

## 2019-03-01 PROCEDURE — 77030019988 HC FCPS ENDOSC DISP BSC -B: Performed by: INTERNAL MEDICINE

## 2019-03-01 PROCEDURE — 76060000032 HC ANESTHESIA 0.5 TO 1 HR: Performed by: INTERNAL MEDICINE

## 2019-03-01 PROCEDURE — 74011250636 HC RX REV CODE- 250/636: Performed by: NURSE ANESTHETIST, CERTIFIED REGISTERED

## 2019-03-01 PROCEDURE — 77030008565 HC TBNG SUC IRR ERBE -B: Performed by: INTERNAL MEDICINE

## 2019-03-01 PROCEDURE — 88305 TISSUE EXAM BY PATHOLOGIST: CPT

## 2019-03-01 PROCEDURE — 77030018846 HC SOL IRR STRL H20 ICUM -A: Performed by: INTERNAL MEDICINE

## 2019-03-01 RX ORDER — SODIUM CHLORIDE 0.9 % (FLUSH) 0.9 %
5-40 SYRINGE (ML) INJECTION EVERY 8 HOURS
Status: DISCONTINUED | OUTPATIENT
Start: 2019-03-01 | End: 2019-03-01 | Stop reason: HOSPADM

## 2019-03-01 RX ORDER — LIDOCAINE HYDROCHLORIDE 10 MG/ML
0.1 INJECTION, SOLUTION EPIDURAL; INFILTRATION; INTRACAUDAL; PERINEURAL AS NEEDED
Status: DISCONTINUED | OUTPATIENT
Start: 2019-03-01 | End: 2019-03-01 | Stop reason: HOSPADM

## 2019-03-01 RX ORDER — SODIUM CHLORIDE 0.9 % (FLUSH) 0.9 %
5-40 SYRINGE (ML) INJECTION AS NEEDED
Status: DISCONTINUED | OUTPATIENT
Start: 2019-03-01 | End: 2019-03-01 | Stop reason: HOSPADM

## 2019-03-01 RX ORDER — PROPOFOL 10 MG/ML
INJECTION, EMULSION INTRAVENOUS AS NEEDED
Status: DISCONTINUED | OUTPATIENT
Start: 2019-03-01 | End: 2019-03-01 | Stop reason: HOSPADM

## 2019-03-01 RX ORDER — SODIUM CHLORIDE, SODIUM LACTATE, POTASSIUM CHLORIDE, CALCIUM CHLORIDE 600; 310; 30; 20 MG/100ML; MG/100ML; MG/100ML; MG/100ML
50 INJECTION, SOLUTION INTRAVENOUS CONTINUOUS
Status: DISCONTINUED | OUTPATIENT
Start: 2019-03-01 | End: 2019-03-01 | Stop reason: HOSPADM

## 2019-03-01 RX ADMIN — FAMOTIDINE 20 MG: 10 INJECTION INTRAVENOUS at 06:43

## 2019-03-01 RX ADMIN — PROPOFOL 50 MG: 10 INJECTION, EMULSION INTRAVENOUS at 08:15

## 2019-03-01 RX ADMIN — PROPOFOL 50 MG: 10 INJECTION, EMULSION INTRAVENOUS at 08:22

## 2019-03-01 RX ADMIN — PROPOFOL 20 MG: 10 INJECTION, EMULSION INTRAVENOUS at 08:18

## 2019-03-01 RX ADMIN — SODIUM CHLORIDE, SODIUM LACTATE, POTASSIUM CHLORIDE, AND CALCIUM CHLORIDE 50 ML/HR: 600; 310; 30; 20 INJECTION, SOLUTION INTRAVENOUS at 06:43

## 2019-03-01 RX ADMIN — SODIUM CHLORIDE, SODIUM LACTATE, POTASSIUM CHLORIDE, AND CALCIUM CHLORIDE: 600; 310; 30; 20 INJECTION, SOLUTION INTRAVENOUS at 07:35

## 2019-03-01 NOTE — H&P
[employer] 
[phone] GASTROENTEROLOGY Pre-Procedure H and P Impression/Plan: 1. This patient is consented for an EGD for dysphagia Chief Complaint: dysphagia HPI: 
Rey Rendon is a 77 y.o. female who is being is having an EGD for dysphagia PMH:  
Past Medical History:  
Diagnosis Date  Abnormal finding on EKG 3/26/2014  
 s/o inf wall mi asymptomatic  Anxiety  Ataxic gait 10/21/2010  Breast cancer ([facility] [address] 05/2014  
 left side  CAD (coronary artery disease) 10/2015 LM normal, LAD mid 70%, OM1 diffuse 80%, RCA dominant with distal 99% on cardiac catheterization  Cardiac nuclear imaging test 11/29/2016 Low risk. Likely inferior basal prior injury. No ischemia. EF 65%. Mild inferobasal hypk. Neg EKG on pharm stress test.  
 Diverticulosis  FHx: breast cancer  GERD (gastroesophageal reflux disease)  Herpes  Hypercholesterolemia  Hypertension  Insomnia  Osteopenia  Osteoporosis screening 2008 WNL per patient, GYN orders  Pap smear 8/2010 GYN, normal  
 Pre-operative cardiovascular examination 3/26/2014  
 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  
 S/P partial mastectomy, left, with sentinel lymph node biopsy, 5/11  Screening mammogram 6/2010 GYN orders PSH:  
Past Surgical History:  
Procedure Laterality Date  CARDIAC SURG PROCEDURE UNLIST  10/2015  
 triple bypass  COLONOSCOPY  09/2008 [place]  HX BREAST BIOPSY    
 right (2000), left (2011)  HX HYSTERECTOMY  10/2009  
 complete, fibroid  HX MASTECTOMY  6./8/11  
 partial left with sentinel lymph node biopsy  HX MASTECTOMY Left 9/10/2014 LEFT PARTIAL MASTECTOMY performed by Kati Maria MD at [facility] MAIN OR  
 HX [address] [facility]    
 left lower leg surgery at age 11  
 HX ORTHOPAEDIC Right 7/2014  ROTATOR CUFF  
  NH LAP, INCISIONAL HERNIA REPAIR,REDUCIBLE N/A 01/19/2017 Dr. Argelia Hampton Social HX:  
Social History Socioeconomic History  Marital status:  Spouse name: Not on file  Number of children: Not on file  Years of education: Not on file  Highest education level: Not on file Social Needs  Financial resource strain: Not on file  Food insecurity - worry: Not on file  Food insecurity - inability: Not on file  Transportation needs - medical: Not on file  Transportation needs - non-medical: Not on file Occupational History  Not on file Tobacco Use  Smoking status: Never Smoker  Smokeless tobacco: Never Used  Tobacco comment: quit age 26 Substance and Sexual Activity  Alcohol use: No  
  Frequency: Never  Drug use: No  
 Sexual activity: Not Currently Other Topics Concern  Not on file Social History Narrative  Not on file FHX:  
Family History Problem Relation Age of Onset  Cancer Mother 48 Breast  
 Breast Cancer Mother 48  
 Heart Disease Father  Cancer Sister 76 Breast  
 Breast Cancer Sister 76  
 Heart Disease Brother  Diabetes Brother  Diabetes Sister Allergy: Allergies Allergen Reactions  Latex Unable to Obtain  Norco [Hydrocodone-Acetaminophen] Swelling Patient experienced throat swelling, hoarseness and difficulty swallowing.  Adhesive Hives  Codeine Nausea Only Pt states she is not allergic. Home Medications:  
 
Medications Prior to Admission Medication Sig  predniSONE (DELTASONE) 1 mg tablet Take 7 mg by mouth daily.  BABY ASPIRIN PO Take 81 mg by mouth daily.  QUEtiapine (SEROQUEL) 25 mg tablet Take 25 mg by mouth nightly.  metoprolol succinate (TOPROL-XL) 25 mg XL tablet Take 1 Tab by mouth daily.  pitavastatin (LIVALO) 1 mg tab tablet Take 1 Tab by mouth daily.  meloxicam (MOBIC) 7.5 mg tablet Take 2 Tabs by mouth daily.  triamterene-hydroCHLOROthiazide (MAXZIDE) 75-50 mg per tablet Take 0.5 Tabs by mouth daily.  MULTIVITAMINS (MULTIVITAMIN PO) Take  by mouth daily. Review of Systems:  
 
Constitutional: No fevers, chills, weight loss, fatigue. Skin: No rashes, pruritis, jaundice, ulcerations, erythema. HENT: No headaches, nosebleeds, sinus pressure, rhinorrhea, sore throat. Eyes: No visual changes, blurred vision, eye pain, photophobia, jaundice. Cardiovascular: No chest pain, heart palpitations. Respiratory: No cough, SOB, wheezing, chest discomfort, orthopnea. Gastrointestinal:   
Genitourinary: No dysuria, bleeding, discharge, pyuria. Musculoskeletal: No weakness, arthralgias, wasting. Endo: No sweats. Heme: No bruising, easy bleeding. Allergies: As noted. Neurological: Cranial nerves intact. Alert and oriented. Gait not assessed. Psychiatric:  No anxiety, depression, hallucinations. Visit Vitals /90 Pulse 72 Temp 97 °F (36.1 °C) Resp 20 Ht 5' 3\" (1.6 m) Wt 75.6 kg (166 lb 9.6 oz) SpO2 95% Breastfeeding? No  
BMI 29.51 kg/m² Physical Assessment:  
 
constitutional: appearance: well developed, well nourished, normal habitus, no deformities, in no acute distress. skin: inspection: no rashes, ulcers, icterus or other lesions; no clubbing or telangiectasias. palpation: no induration or subcutaneos nodules. eyes: inspection: normal conjunctivae and lids; no jaundice pupils: normal 
ENMT: mouth: normal oral mucosa,lips and gums; good dentition. oropharynx: normal tongue, hard and soft palate; posterior pharynx without erithema, exudate or lesions. neck: thyroid: normal size, consistency and position; no masses or tenderness. respiratory: effort: normal chest excursion; no intercostal retraction or accessory muscle use. cardiovascular: abdominal aorta: normal size and position; no bruits. palpation: PMI of normal size and position; normal rhythm; no thrill or murmurs. abdominal: abdomen: normal consistency; no tenderness or masses. hernias: no hernias appreciated. liver: normal size and consistency. spleen: not palpable. rectal: hemoccult/guaiac: not performed. musculoskeletal: digits and nails: no clubbing, cyanosis, petechiae or other inflammatory conditions. gait: normal gait and station head and neck: normal range of motion; no pain, crepitation or contracture. spine/ribs/pelvis: normal range of motion; no pain, deformity or contracture. neurologic: cranial nerves: II-XII normal.  
psychiatric: judgement/insight: within normal limits. memory: within normal limits for recent and remote events. mood and affect: no evidence of depression, anxiety or agitation. orientation: oriented to time, space and person. Basic Metabolic Profile No results for input(s): NA, K, CL, CO2, BUN, GLU, CA, MG, PHOS in the last 72 hours. No lab exists for component: CREAT  
  
CBC w/Diff No results for input(s): WBC, RBC, HGB, HCT, MCV, MCH, MCHC, RDW, PLT, HGBEXT, HCTEXT, PLTEXT in the last 72 hours. No lab exists for component: MPV No results for input(s): GRANS, LYMPH, EOS, PRO, MYELO, METAS, BLAST in the last 72 hours. No lab exists for component: MONO, BASO Hepatic Function No results for input(s): ALB, TP, TBILI, GPT, SGOT, AP, AML, LPSE in the last 72 hours. No lab exists for component: DBILI Coags No results for input(s): PTP, INR, APTT in the last 72 hours. No lab exists for component: INREXT Wayna Cheadle, MD 
[facility] [address] Cell: [phone] Direct pager: [phone] [email or web address] 
[email or web address] By Search

## 2019-03-01 NOTE — ANESTHESIA PREPROCEDURE EVALUATION
Anesthetic History No history of anesthetic complications Review of Systems / Medical History Patient summary reviewed and pertinent labs reviewed Pulmonary Within defined limits Neuro/Psych Cardiovascular Hypertension: well controlled CAD Exercise tolerance: <4 METS 
  
GI/Hepatic/Renal 
Within defined limits Endo/Other Arthritis Other Findings Comments: Current Smoker? no 
     Elective Surgery? Yes Abstained from smoking 24 hours prior to anesthesia? NO Risk Factors for Postoperative nausea/vomiting: 
     History of postoperative nausea/vomiting? NO Female? YES Motion sickness? NO Intended opioid administration for postoperative analgesia? NO Physical Exam 
 
Airway Mallampati: III 
TM Distance: 4 - 6 cm Neck ROM: normal range of motion Mouth opening: Normal 
 
 Cardiovascular Regular rate and rhythm,  S1 and S2 normal,  no murmur, click, rub, or gallop Dental 
 
Dentition: Poor dentition Pulmonary Breath sounds clear to auscultation Abdominal 
GI exam deferred Other Findings Anesthetic Plan ASA: 3 Anesthesia type: MAC Induction: Intravenous Anesthetic plan and risks discussed with: Patient and Family

## 2019-03-01 NOTE — PERIOP NOTES
After Visit Summary   12/8/2017    Chilo Oneil    MRN: [id number]           Patient Information     Date Of Birth          1952        Visit Information        Provider Department      12/8/2017 10:00 AM JASMYN PIPER FP/ALEJANDRO RN [facility]        Today's Diagnoses     Benign essential hypertension    -  1       Follow-ups after your visit        Follow-up notes from your care team     Return for BP Recheck.      Your next 10 appointments already scheduled     Dec 18, 2017 12:00 PM CST   (Arrive by 11:45 AM)   CT LUNG MEDIASTINUM BIOPSY with FELIZ SALDIVAR IMAGING NURSEFELIZ   [facility] CT ([facility])    [address] [facility] [id number]   [phone]           Plan for an adult to drive you home and stay with you until morning.  Tell your doctor in advance:   If you have any allergies.   If you are breastfeeding or there s any chance you are pregnant.  Please bring any scans or X-rays taken at other hospitals, if they may be helpful. Also bring a list of your medicines, including vitamins, minerals and over-the-counter drugs. It is safest to leave valuables at home.  If you take blood thinners, you may need to stop taking them a few days before treatment. Talk to your doctor before stopping these medicines. You will need a blood test the morning of your exam.   Stop taking Coumadin (warfarin) 5 days before treatment. Restart the day after treatment.   If you take aspirin, you may need to stop taking it 3 days before treatment.   If you take Plavix, Ticlid, Pletal or Persantine, you may need to stop taking them 5 days before your scan.  If you have diabetes:   If you take insulin, call your diabetes care team. Ask if you should adjust your insulin before this test.   If your kidney function is normal, continue taking your metformin (Avandamet, Glucophage, Glucovance, Metaglip) on the day of your exam.   If your kidney function is abnormal, wait 48 hours  Patient confirmed by two identifiers with discharge instructions prior too being provided to patient. Patient armband removed and shredded "before restarting this medicine.  If you have any questions, please call the imaging department where you will have your exam.  The day before your exam: Drink extra fluids at least six 8-ounce glasses (unless your doctor tells you to restrict fluids). The day of your exam: No eating or drinking for 4 hours before your test. You may take medicine with small sips of water.              Who to contact     If you have questions or need follow up information about today's clinic visit or your schedule please contact [facility] directly at [phone].  Normal or non-critical lab and imaging results will be communicated to you by Odysiihart, letter or phone within 4 business days after the clinic has received the results. If you do not hear from us within 7 days, please contact the clinic through Wineristt or phone. If you have a critical or abnormal lab result, we will notify you by phone as soon as possible.  Submit refill requests through Neterion or call your pharmacy and they will forward the refill request to us. Please allow 3 business days for your refill to be completed.          Additional Information About Your Visit        MyChart Information     Neterion lets you send messages to your doctor, view your test results, renew your prescriptions, schedule appointments and more. To sign up, go to [email or web address] . Click on \"Log in\" on the left side of the screen, which will take you to the Welcome page. Then click on \"Sign up Now\" on the right side of the page.     You will be asked to enter the access code listed below, as well as some personal information. Please follow the directions to create your username and password.     Your access code is: [email or web address]  Expires: 2018 12:18 PM     Your access code will  in 90 days. If you need help or a new code, please call your [facility]3] or [phone].        Care EveryWhere ID     This is your Care EveryWhere ID. This could be used by " other organizations to access your [place] medical records  [id number]         Blood Pressure from Last 3 Encounters:   12/08/17 160/80   12/06/17 168/84   11/29/17 154/82    Weight from Last 3 Encounters:   12/06/17 192 lb (87.1 kg)   11/29/17 187 lb (84.8 kg)   08/19/16 179 lb (81.2 kg)              Today, you had the following     No orders found for display       Primary Care Provider Office Phone # Fax #    Belkys Mcgee  [phone] [address] [facility] [address]        Equal Access to Services     [place] : Hadii krysta walterso Soearl, waaxda luqadaha, qaybta kaalmada adeartemioda, celina fisher. So [facility] [phone].    ATENCIÓN: Si habla español, tiene a galaviz disposición servicios gratuitos de asistencia lingüística. Llame al [phone].    We comply with applicable federal civil rights laws and [place] laws. We do not discriminate on the basis of race, color, national origin, age, disability, sex, sexual orientation, or gender identity.            Thank you!     Thank you for choosing [facility]  for your care. Our goal is always to provide you with excellent care. Hearing back from our patients is one way we can continue to improve our services. Please take a few minutes to complete the written survey that you may receive in the mail after your visit with us. Thank you!             Your Updated Medication List - Protect others around you: Learn how to safely use, store and throw away your medicines at www.disposemymeds.org.          This list is accurate as of: 12/8/17 10:41 AM.  Always use your most recent med list.                   Brand Name Dispense Instructions for use Diagnosis    aspirin 325 MG tablet     90 tablet    Take 1 tablet (325 mg) by mouth daily    Pain of left lower leg       hydrochlorothiazide 25 MG tablet    HYDRODIURIL    90 tablet    Take 1 tablet (25 mg) by mouth daily    Benign essential  hypertension       indomethacin 25 MG capsule    INDOCIN    42 capsule    Take 1 capsule (25 mg) by mouth 2 times daily (with meals)    Chest wall pain, Rib pain on left side       metoprolol 25 MG 24 hr tablet    TOPROL XL    90 tablet    Take 1 tablet (25 mg) by mouth daily    Coronary artery disease, angina presence unspecified, unspecified vessel or lesion type, unspecified whether native or transplanted heart       metoprolol 50 MG tablet    LOPRESSOR    60 tablet    Take 1 tablet (50 mg) by mouth 2 times daily    Benign essential hypertension

## 2019-03-01 NOTE — ANESTHESIA POSTPROCEDURE EVALUATION
Procedure(s): UPPER ENDOSCOPY with Bx's. Anesthesia Post Evaluation Multimodal analgesia: multimodal analgesia used between 6 hours prior to anesthesia start to PACU discharge Patient location during evaluation: bedside Patient participation: complete - patient participated Level of consciousness: awake Pain management: adequate Airway patency: patent Anesthetic complications: no 
Cardiovascular status: stable Respiratory status: acceptable Hydration status: acceptable Post anesthesia nausea and vomiting:  controlled Visit Vitals /83 (BP Patient Position: At rest) Pulse 70 Temp 36.8 °C (98.3 °F) Resp 20 Ht 5' 3\" (1.6 m) Wt 75.6 kg (166 lb 9.6 oz) SpO2 99% Breastfeeding? No  
BMI 29.51 kg/m²

## 2019-03-01 NOTE — DISCHARGE INSTRUCTIONS
Esophagitis: Care Instructions  Your Care Instructions    Esophagitis (say \"ih-sof-uh-JY-tus\") is irritation of the esophagus, the tube that carries food from your throat to your stomach. Acid reflux is the most common cause of this condition. When you have reflux, stomach acid and juices flow upward. This can cause pain or a burning feeling in your chest. You may have a sore throat. It may be hard to swallow. Other causes of this condition include some medicines and supplements. Allergies or an infection can also cause it. Your doctor will ask about your symptoms and past health. He or she might do tests to find the cause of your symptoms. Treatment depends on what is causing the problem. Treatment might include changing your diet or taking medicine to relieve your symptoms. It might also include changing a medicine that is causing your symptoms. If you have reflux, medicine that reduces the stomach acid helps your body heal. It might take 1 to 3 weeks to heal.  Follow-up care is a key part of your treatment and safety. Be sure to make and go to all appointments, and call your doctor if you are having problems. It's also a good idea to know your test results and keep a list of the medicines you take. How can you care for yourself at home? · If you have acid reflux, your doctor may recommend that you:  ? Eat several small meals instead of two or three large meals. After you eat, wait 2 to 3 hours before you lie down. ? Avoid chocolate, mint, alcohol, and spicy foods. ? Don't smoke or use smokeless tobacco. Smoking can make this condition worse. If you need help quitting, talk to your doctor about stop-smoking programs and medicines. These can increase your chances of quitting for good. ? Raise the head of your bed 6 to 8 inches if you have symptoms at night. ? Lose weight if you are overweight. ? Take an over-the-counter antacid, such as Maalox, Mylanta, or Tums.  Be careful when you take over-the-counter antacid medicines. Many of these medicines have aspirin in them. Read the label to make sure that you are not taking more than the recommended dose. Too much aspirin can be harmful. ? Take stronger acid reducers. Examples are famotidine (such as Pepcid), omeprazole (such as Prilosec), and ranitidine (such as Zantac). · If your condition is caused by infection, allergy, or other problems, use the medicine or treatments that your doctor recommends. · Be safe with medicines. Take your medicines exactly as prescribed. Call your doctor if you think you are having a problem with your medicine. When should you call for help? Call your doctor now or seek immediate medical care if:    · You have new or worse belly pain.     · You are vomiting.    Watch closely for changes in your health, and be sure to contact your doctor if:    · You have new or worse symptoms of reflux.     · You have trouble or pain swallowing.     · You are losing weight.     · You do not get better as expected. Where can you learn more? Go to http://obinna-luda.info/. Enter Y864 in the search box to learn more about \"Esophagitis: Care Instructions. \"  Current as of: March 27, 2018  Content Version: 11.9  © 5213-9479 [employer]. Care instructions adapted under license by [employer] (which disclaims liability or warranty for this information). If you have questions about a medical condition or this instruction, always ask your healthcare professional. [place] [address] any warranty or liability for your use of this information. Hiatal Hernia: Care Instructions  Your Care Instructions  A hiatal hernia occurs when part of the stomach bulges into the chest cavity. A hiatal hernia may allow stomach acid and juices to back up into the esophagus (acid reflux). This can cause a feeling of burning, warmth, heat, or pain behind the breastbone.  This feeling may often occur after you eat, soon after you lie down, or when you bend forward, and it may come and go. You also may have a sour taste in your mouth. These symptoms are commonly known as heartburn or reflux. But not all hiatal hernias cause symptoms. Follow-up care is a key part of your treatment and safety. Be sure to make and go to all appointments, and call your doctor if you are having problems. It's also a good idea to know your test results and keep a list of the medicines you take. How can you care for yourself at home? · Take your medicines exactly as prescribed. Call your doctor if you think you are having a problem with your medicine. · Do not take aspirin or other nonsteroidal anti-inflammatory drugs (NSAIDs), such as ibuprofen (Advil, Motrin) or naproxen (Aleve), unless your doctor says it is okay. Ask your doctor what you can take for pain. · Your doctor may recommend over-the-counter medicine. For mild or occasional indigestion, antacids such as Tums, Gaviscon, Maalox, or Mylanta may help. Your doctor also may recommend over-the-counter acid reducers, such as famotidine (Pepcid AC), cimetidine (Tagamet HB), ranitidine (Zantac 75 and Zantac 150), or omeprazole (Prilosec). Read and follow all instructions on the label. If you use these medicines often, talk with your doctor. · Change your eating habits. ? It's best to eat several small meals instead of two or three large meals. ? After you eat, wait 2 to 3 hours before you lie down. Late-night snacks aren't a good idea. ? Chocolate, mint, and alcohol can make heartburn worse. They relax the valve between the esophagus and the stomach. ? Spicy foods, foods that have a lot of acid (like tomatoes and oranges), and coffee can make heartburn symptoms worse in some people. If your symptoms are worse after you eat a certain food, you may want to stop eating that food to see if your symptoms get better.   · Do not smoke or chew tobacco.  · If you get heartburn at night, raise the head of your bed 6 to 8 inches by putting the frame on blocks or placing a foam wedge under the head of your mattress. (Adding extra pillows does not work.)  · Do not wear tight clothing around your middle. · Lose weight if you need to. Losing just 5 to 10 pounds can help. When should you call for help? Call your doctor now or seek immediate medical care if:    · You have new or worse belly pain.     · You are vomiting.    Watch closely for changes in your health, and be sure to contact your doctor if:    · You have new or worse symptoms of indigestion.     · You have trouble or pain swallowing.     · You are losing weight.     · You do not get better as expected. Where can you learn more? Go to http://obinna-luda.info/. Enter S328 in the search box to learn more about \"Hiatal Hernia: Care Instructions. \"  Current as of: March 27, 2018  Content Version: 11.9  © 7823-2140 [employer]. Care instructions adapted under license by [employer] (which disclaims liability or warranty for this information). If you have questions about a medical condition or this instruction, always ask your healthcare professional. [place] [address] any warranty or liability for your use of this information. Upper GI Endoscopy: What to Expect at [address] [place] [address]  After you have an endoscopy, you will stay at the hospital or clinic for 1 to 2 hours. This will allow the medicine to wear off. You will be able to go home after your doctor or nurse checks to make sure you are not having any problems. You may have to stay overnight if you had treatment during the test. You may have a sore throat for a day or two after the test.  This care sheet gives you a general idea about what to expect after the test.  How can you care for yourself at home? Activity  · Rest as much as you need to after you go home.   · You should be able to go back to your usual activities the day after the test.  Diet  · Follow your doctor's directions for eating after the test.  · Drink plenty of fluids (unless your doctor has told you not to). Medications  · If you have a sore throat the day after the test, use an over-the-counter spray to numb your throat. Follow-up care is a key part of your treatment and safety. Be sure to make and go to all appointments, and call your doctor if you are having problems. It's also a good idea to know your test results and keep a list of the medicines you take. When should you call for help? Call 911 anytime you think you may need emergency care. For example, call if:    · You passed out (loses consciousness).     · You have trouble breathing.     · You pass maroon or bloody stools.    Call your doctor now or seek immediate medical care if:    · You have pain that does not get better after your take pain medicine.     · You have new or worse belly pain.     · You have blood in your stools.     · You are sick to your stomach and cannot keep fluids down.     · You have a fever.     · You cannot pass stools or gas.    Watch closely for changes in your health, and be sure to contact your doctor if:    · Your throat still hurts after a day or two.     · You do not get better as expected. Where can you learn more? Go to http://obinna-luda.info/. Enter [phone] 679-060 in the search box to learn more about \"Upper GI Endoscopy: What to Expect at Home. \"  Current as of: March 27, 2018  Content Version: 11.9  © 3716-6791 [employer]. Care instructions adapted under license by [employer] (which disclaims liability or warranty for this information). If you have questions about a medical condition or this instruction, always ask your healthcare professional. [place] [address] any warranty or liability for your use of this information.   DISCHARGE SUMMARY from Nurse    PATIENT INSTRUCTIONS:    After general anesthesia or intravenous sedation, for 24 hours or while taking prescription Narcotics:  · Limit your activities  · Do not drive and operate hazardous machinery  · Do not make important personal or business decisions  · Do  not drink alcoholic beverages  · If you have not urinated within 8 hours after discharge, please contact your surgeon on call. Report the following to your surgeon:  · Excessive pain, swelling, redness or odor of or around the surgical area  · Temperature over 100.5  · Nausea and vomiting lasting longer than 4 hours or if unable to take medications  · Any signs of decreased circulation or nerve impairment to extremity: change in color, persistent  numbness, tingling, coldness or increase pain  · Any questions    *  Please give a list of your current medications to your Primary Care Provider. *  Please update this list whenever your medications are discontinued, doses are      changed, or new medications (including over-the-counter products) are added. *  Please carry medication information at all times in case of emergency situations. These are general instructions for a healthy lifestyle:    No smoking/ No tobacco products/ Avoid exposure to second hand smoke  Surgeon General's Warning:  Quitting smoking now greatly reduces serious risk to your health. Obesity, smoking, and sedentary lifestyle greatly increases your risk for illness    A healthy diet, regular physical exercise & weight monitoring are important for maintaining a healthy lifestyle    You may be retaining fluid if you have a history of heart failure or if you experience any of the following symptoms:  Weight gain of 3 pounds or more overnight or 5 pounds in a week, increased swelling in our hands or feet or shortness of breath while lying flat in bed. Please call your doctor as soon as you notice any of these symptoms; do not wait until your next office visit.     Recognize signs and symptoms of STROKE:    F-face looks uneven    A-arms unable to move or move unevenly    S-speech slurred or non-existent    T-time-call 911 as soon as signs and symptoms begin-DO NOT go       Back to bed or wait to see if you get better-TIME IS BRAIN. Warning Signs of HEART ATTACK     Call 911 if you have these symptoms:   Chest discomfort. Most heart attacks involve discomfort in the center of the chest that lasts more than a few minutes, or that goes away and comes back. It can feel like uncomfortable pressure, squeezing, fullness, or pain.  Discomfort in other areas of the upper body. Symptoms can include pain or discomfort in one or both arms, the back, neck, jaw, or stomach.  Shortness of breath with or without chest discomfort.  Other signs may include breaking out in a cold sweat, nausea, or lightheadedness. Don't wait more than five minutes to call 911 - MINUTES MATTER! Fast action can save your life. Calling 911 is almost always the fastest way to get lifesaving treatment. Emergency Medical Services staff can begin treatment when they arrive -- up to an hour sooner than if someone gets to the hospital by car. The discharge information has been reviewed with the patient. The patient verbalized understanding. Discharge medications reviewed with the patient and appropriate educational materials and side effects teaching were provided.   ___________________________________________________________________________________________________________________________________

## 2019-03-01 NOTE — PROGRESS NOTES
[email or web address] 
[phone] [address] [facility], [address] [employer] Procedure Note Hemant Suarez 
1940 
[id number] Date of Procedure: 3/1/2019 Preoperative diagnosis: ELEVATED BLOOD PRESSURE 
DYSPHAGIA, UNSPECIFIED PERSONAL HISTORY OF COLON POLYPS Postoperative diagnosis: Hiatal Hernia, Errosive Esophagitis, Esophageal Stricture Type of Anesthesia: MAC (Monitored anesthesia care) Description of findings: same as post op dx Procedure: Procedure(s): UPPER ENDOSCOPY with Bx's :  Dr. Latricia Panchal MD 
 
Assistant(s): Endoscopy Technician-1: Jo Ann Santana Endoscopy RN-1: Jamari Buck RN; Brittany Sloan RN Devices/implants/grafts/tissues/prosthesis: None EBL:None Specimens:  
ID Type Source Tests Collected by Time Destination 1 : Esophagus Bx's Preservative Esophagus  Rena Betancur MD 3/1/2019 9547 Pathology Findings: See printed and scanned procedure note Complications: None Dr. Latricia Panchal MD 
3/1/2019  8:35 AM

## 2019-04-17 ENCOUNTER — OFFICE VISIT (OUTPATIENT)
Dept: CARDIOLOGY CLINIC | Age: 79
End: 2019-04-17

## 2019-04-17 VITALS
HEART RATE: 107 BPM | HEIGHT: 63 IN | BODY MASS INDEX: 29.41 KG/M2 | DIASTOLIC BLOOD PRESSURE: 66 MMHG | WEIGHT: 166 LBS | SYSTOLIC BLOOD PRESSURE: 132 MMHG | OXYGEN SATURATION: 97 %

## 2019-04-17 DIAGNOSIS — I10 ESSENTIAL HYPERTENSION: ICD-10-CM

## 2019-04-17 DIAGNOSIS — I25.10 CORONARY ARTERY DISEASE INVOLVING NATIVE CORONARY ARTERY OF NATIVE HEART WITHOUT ANGINA PECTORIS: Primary | ICD-10-CM

## 2019-04-17 DIAGNOSIS — R00.2 PALPITATIONS: ICD-10-CM

## 2019-04-17 DIAGNOSIS — Z95.1 S/P CABG X 3: ICD-10-CM

## 2019-04-17 DIAGNOSIS — E78.00 HYPERCHOLESTEREMIA: ICD-10-CM

## 2019-04-17 DIAGNOSIS — R06.02 SOB (SHORTNESS OF BREATH): ICD-10-CM

## 2019-04-17 DIAGNOSIS — R06.09 DOE (DYSPNEA ON EXERTION): ICD-10-CM

## 2019-04-17 NOTE — PROGRESS NOTES
HISTORY OF PRESENT ILLNESS Sumit Maria is a 77 y.o. female. Follow-up Associated symptoms include shortness of breath. Pertinent negatives include no chest pain, no abdominal pain and no headaches. Shortness of Breath Pertinent negatives include no fever, no headaches, no cough, no wheezing, no PND, no orthopnea, no chest pain, no vomiting, no abdominal pain, no rash, no leg swelling and no claudication. Palpitations Associated symptoms include shortness of breath. Pertinent negatives include no fever, no malaise/fatigue, no chest pain, no claudication, no orthopnea, no PND, no abdominal pain, no nausea, no vomiting, no headaches, no dizziness and no cough. Patient presents for a follow-up office visit. She was previously followed by Dr. Raynald Kehr. She has a past medical history significant for coronary disease with a prior inferolateral myocardial infarction, found to have three-vessel coronary disease on cardiac catheterization in October 2015, subsequently undergoing three-vessel bypass surgery later that month using a LIMA to LAD, SVG to OM and SVG to distal RCA at [facility]. Patient last underwent noninvasive cardiac imaging in April 2018 including a pharmacologic nuclear stress test and an echocardiogram her stress test was negative for ischemia or infarction, EF 64%. Her echocardiogram demonstrated preserved LV systolic function, grade 1 diastolic dysfunction, a sclerotic aortic valve without stenosis with mild to moderate aortic insufficiency. She was last seen in our office 6 months ago. Since has noted worsening shortness of breath especially with exertion and decreased activity level. Also been having heart palpitations which feels like her heart is racing and beating irregularly. This is associate with occasional dizziness but no azar syncope or near syncope.   She feels she is more fatigued than usual.  She recently was evaluated by her primary care provider and extensive blood work all of which was normal according to the patient. Should be noted that she was started on prednisone within the past year for rheumatoid arthritis, and she feels this may be contributing to her symptoms. Past Medical History:  
Diagnosis Date  Abnormal finding on EKG 3/26/2014  
 s/o inf wall mi asymptomatic  Anxiety  Ataxic gait 10/21/2010  Breast cancer ([facility] [address] 05/2014  
 left side  CAD (coronary artery disease) 10/2015 LM normal, LAD mid 70%, OM1 diffuse 80%, RCA dominant with distal 99% on cardiac catheterization  Cardiac nuclear imaging test 11/29/2016 Low risk. Likely inferior basal prior injury. No ischemia. EF 65%. Mild inferobasal hypk. Neg EKG on pharm stress test.  
 Diverticulosis  FHx: breast cancer  GERD (gastroesophageal reflux disease)  Herpes  Hypercholesterolemia  Hypertension  Insomnia  Osteopenia  Osteoporosis screening 2008 WNL per patient, GYN orders  Pap smear 8/2010 GYN, normal  
 Pre-operative cardiovascular examination 3/26/2014  
 for shoulder surgery  Rheumatoid arthritis ([facility] [address]  S/P CABG x 3 10/2015 LIMA to LAD, SVG to OM, SVG to distal RCA  
 S/P partial mastectomy, left, with sentinel lymph node biopsy, 5/11  Screening mammogram 6/2010 GYN orders Current Outpatient Medications Medication Sig Dispense Refill  predniSONE (DELTASONE) 1 mg tablet Take 7 mg by mouth daily.  BABY ASPIRIN PO Take 81 mg by mouth daily.  QUEtiapine (SEROQUEL) 25 mg tablet Take 25 mg by mouth nightly.  metoprolol succinate (TOPROL-XL) 25 mg XL tablet Take 1 Tab by mouth daily. 90 Tab 3  pitavastatin (LIVALO) 1 mg tab tablet Take 1 Tab by mouth daily. 90 Tab 3  
 meloxicam (MOBIC) 7.5 mg tablet Take 2 Tabs by mouth daily. 30 Tab 0  
 triamterene-hydroCHLOROthiazide (MAXZIDE) 75-50 mg per tablet Take 0.5 Tabs by mouth daily.  MULTIVITAMINS (MULTIVITAMIN PO) Take  by mouth daily. Allergies Allergen Reactions  Latex Unable to Obtain  Norco [Hydrocodone-Acetaminophen] Swelling Patient experienced throat swelling, hoarseness and difficulty swallowing.  Adhesive Hives  Codeine Nausea Only Pt states she is not allergic. Social History Tobacco Use  Smoking status: Never Smoker  Smokeless tobacco: Never Used  Tobacco comment: quit age 26 Substance Use Topics  Alcohol use: No  
  Frequency: Never  Drug use: No  
 
Family History Problem Relation Age of Onset  Cancer Mother 48 Breast  
 Breast Cancer Mother 48  
 Heart Disease Father  Cancer Sister 76 Breast  
 Breast Cancer Sister 76  
 Heart Disease Brother  Diabetes Brother  Diabetes Sister Review of Systems Constitutional: Negative for chills, fever, malaise/fatigue and weight loss. HENT: Negative for nosebleeds. Eyes: Negative for blurred vision and double vision. Respiratory: Positive for shortness of breath. Negative for cough and wheezing. Cardiovascular: Positive for palpitations. Negative for chest pain, orthopnea, claudication, leg swelling and PND. Gastrointestinal: Negative for abdominal pain, heartburn, nausea and vomiting. Genitourinary: Negative for dysuria and hematuria. Musculoskeletal: Negative for falls and myalgias. Skin: Negative for rash. Neurological: Negative for dizziness, focal weakness and headaches. Endo/Heme/Allergies: Does not bruise/bleed easily. Psychiatric/Behavioral: Negative for substance abuse. Visit Vitals /66 Pulse (!) 107 Ht 5' 3\" (1.6 m) Wt 75.3 kg (166 lb) SpO2 97% BMI 29.41 kg/m² Physical Exam  
Constitutional: She is oriented to person, place, and time. She appears well-developed and well-nourished. HENT:  
Head: Normocephalic and atraumatic.   
Eyes: Conjunctivae are normal.  
 Neck: Neck supple. No JVD present. Carotid bruit is not present. Cardiovascular: Regular rhythm, S1 normal, S2 normal and normal pulses. Occasional extrasystoles are present. Tachycardia present. Exam reveals distant heart sounds. Exam reveals no gallop. No murmur heard. Pulmonary/Chest: Effort normal. She has no decreased breath sounds. She has no wheezes. She has no rhonchi. She has no rales. Abdominal: Soft. Bowel sounds are normal. There is no tenderness. Musculoskeletal: She exhibits no edema. Neurological: She is alert and oriented to person, place, and time. Skin: Skin is warm and dry. EKG: Sinus tachycardia, occasional atrial premature contractions, left axis deviation, borderline inferior Q waves, low voltage throughout, poor R wave progression, nonspecific T wave abnormality. Compared to the previous EKG, the heart rate has increased, voltage is decreased ASSESSMENT and PLAN Dyspnea on exertion. This may be cardiac in nature. I have recommended a follow-up echocardiogram to reevaluate her LV function and assess for any worsening valvular heart disease and eval for any pericardial effusion given her low voltage. Heart palpitations. Patient's heart rate is a little faster than baseline today. She is also having occasional atrial premature contraction. I have recommended a 48-hour Holter monitor for further evaluation. Coronary artery disease. Status post three-vessel bypass surgery in October 2015 using a LIMA to LAD, SVG to OM, and SVG to distal RCA. She last underwent a pharmacological nuclear stress test in April 2018 which is a low risk study. She remains on aspirin, statin and beta-blocker, which I would continue. If her echocardiogram is unremarkable, she may need a repeat stress test to rule out coronary ischemia. Essential hypertension. Patient blood pressure appears reasonable on her current medical regimen, which I would continue. Dyslipidemia. Patient has been [place], and this is followed closely by her PCP. Her LDL should be less than 70 from a cardiac standpoint. Follow-up in 4 months, sooner if needed.

## 2019-04-24 ENCOUNTER — HOSPITAL ENCOUNTER (OUTPATIENT)
Dept: NON INVASIVE DIAGNOSTICS | Age: 79
Discharge: HOME | End: 2019-04-24
Attending: INTERNAL MEDICINE
Payer: MEDICARE

## 2019-04-24 VITALS
BODY MASS INDEX: 29.41 KG/M2 | SYSTOLIC BLOOD PRESSURE: 132 MMHG | DIASTOLIC BLOOD PRESSURE: 66 MMHG | WEIGHT: 166 LBS | HEIGHT: 63 IN

## 2019-04-24 DIAGNOSIS — I25.10 CORONARY ARTERY DISEASE INVOLVING NATIVE CORONARY ARTERY OF NATIVE HEART WITHOUT ANGINA PECTORIS: ICD-10-CM

## 2019-04-24 DIAGNOSIS — R06.02 SOB (SHORTNESS OF BREATH): ICD-10-CM

## 2019-04-24 DIAGNOSIS — R06.09 DOE (DYSPNEA ON EXERTION): ICD-10-CM

## 2019-04-24 DIAGNOSIS — R00.2 PALPITATIONS: ICD-10-CM

## 2019-04-24 LAB
ECHO LV INTERNAL DIMENSION DIASTOLIC: 3.77 CM (ref 3.9–5.3)
ECHO LV INTERNAL DIMENSION SYSTOLIC: 2.96 CM
ECHO LV IVSD: 1.16 CM (ref 0.6–0.9)
ECHO LV MASS 2D: 168.8 G (ref 67–162)
ECHO LV MASS INDEX 2D: 94.5 G/M2 (ref 43–95)
ECHO LV POSTERIOR WALL DIASTOLIC: 1.19 CM (ref 0.6–0.9)

## 2019-04-24 PROCEDURE — 93226 XTRNL ECG REC<48 HR SCAN A/R: CPT

## 2019-04-24 PROCEDURE — 93306 TTE W/DOPPLER COMPLETE: CPT

## 2019-04-25 NOTE — PROGRESS NOTES
Per your last note\" Dyspnea on exertion. This may be cardiac in nature. I have recommended a follow-up echocardiogram to reevaluate her LV function and assess for any worsening valvular heart disease and eval for any pericardial effusion given her low voltage.

## 2019-04-26 ENCOUNTER — TELEPHONE (OUTPATIENT)
Dept: CARDIOLOGY CLINIC | Age: 79
End: 2019-04-26

## 2019-04-26 NOTE — TELEPHONE ENCOUNTER
----- Message from Seda Whitten MD sent at 4/26/2019  8:49 AM EDT -----  Please let the patient that her echocardiogram was unremarkable.  ----- Message -----  From: Trinh Baer LPN  Sent: 2/43/5535   8:28 AM  To: Seda Whitten MD    Per your last note\" Dyspnea on exertion. This may be cardiac in nature.   I have recommended a follow-up echocardiogram to reevaluate her LV function and assess for any worsening valvular heart disease and eval for any pericardial effusion given her low voltage.

## 2019-05-07 NOTE — PROGRESS NOTES
Per your last note' Heart palpitations. Patient's heart rate is a little faster than baseline today. She is also having occasional atrial premature contraction. I have recommended a 48-hour Holter monitor for further evaluation.

## 2019-05-08 ENCOUNTER — TELEPHONE (OUTPATIENT)
Dept: CARDIOLOGY CLINIC | Age: 79
End: 2019-05-08

## 2019-05-08 DIAGNOSIS — R00.2 PALPITATIONS: Primary | ICD-10-CM

## 2019-05-08 NOTE — TELEPHONE ENCOUNTER
Verified patient name and . Results have been fully explained to the patient, who indicates understanding. Event monitor order place with preventice, patient states she is still having increased palpitations.

## 2019-05-08 NOTE — TELEPHONE ENCOUNTER
----- Message from Nicola Deluca MD sent at 5/7/2019 11:50 AM EDT -----  Please let the patient know that she was having frequent atrial premature contractions during her Holter monitor she her heart also went out of rhythm briefly, which will need to keep a close eye on. She should let us know if she is having increased heart palpitations. If that is the case, we may need to arrange for a 30-day event  ----- Message -----  From: Jannie Crawford LPN  Sent: 3/5/5629   8:37 AM  To: Nicola Deluca MD    Per your last note' Heart palpitations. Patient's heart rate is a little faster than baseline today. She is also having occasional atrial premature contraction.   I have recommended a 48-hour Holter monitor for further evaluation.

## 2019-06-03 ENCOUNTER — TELEPHONE (OUTPATIENT)
Dept: CARDIOLOGY CLINIC | Age: 79
End: 2019-06-03

## 2019-06-03 NOTE — TELEPHONE ENCOUNTER
Event recorder company called to report patient had a recorded episode of SVT with rates as high as 181 bpm today at 1033 am. Called patient to inquire if she was symptomatic, if she has taken her medications today, etc. LM on AM for patient to call back.

## 2019-06-03 NOTE — TELEPHONE ENCOUNTER
Patient called back to report she has been having the same symptoms as she has been, SOB and palpitations. Stated\" nothing different than what I have been experiencing for a little while now. \" All day medications taken this morning such as her Toprol-XL.

## 2019-06-04 ENCOUNTER — OFFICE VISIT (OUTPATIENT)
Dept: NEUROLOGY | Age: 79
End: 2019-06-04

## 2019-06-04 VITALS
HEART RATE: 84 BPM | OXYGEN SATURATION: 96 % | TEMPERATURE: 99.1 F | SYSTOLIC BLOOD PRESSURE: 130 MMHG | RESPIRATION RATE: 18 BRPM | WEIGHT: 164 LBS | HEIGHT: 63 IN | BODY MASS INDEX: 29.06 KG/M2 | DIASTOLIC BLOOD PRESSURE: 66 MMHG

## 2019-06-04 DIAGNOSIS — I48.0 PAROXYSMAL ATRIAL FIBRILLATION (HCC): ICD-10-CM

## 2019-06-04 DIAGNOSIS — R27.0 ATAXIA: Primary | ICD-10-CM

## 2019-06-04 DIAGNOSIS — R41.89 COGNITIVE IMPAIRMENT: ICD-10-CM

## 2019-06-04 DIAGNOSIS — I67.9 CEREBROVASCULAR DISEASE: ICD-10-CM

## 2019-06-04 NOTE — PATIENT INSTRUCTIONS
Thank you for choosing [facility] and [facility] for your     care. You may receive a survey about your visit. We appreciate you taking time     to complete this survey as we use your feedback to improve our services. We     realize we are not perfect, but we strive to provide excellent care.

## 2019-06-04 NOTE — LETTER
6/4/19 Patient: Bean Cooper  
YOB: 1940 Date of Visit: 6/4/2019 Jud Tse MD 
[address] [place] [address] VIA Facsimile: [phone] Dear Jud Tse MD, Thank you for referring Ms. Carol Mondragon to [facility] for evaluation. My notes for this consultation are attached. If you have questions, please do not hesitate to call me. I look forward to following your patient along with you.  
 
 
Sincerely, 
 
Gi Bailey MD

## 2019-06-04 NOTE — PROGRESS NOTES
HPI: 69-year-old female with history of polymyalgia rheumatica on long-term steroids, history of breast cancer, CAD s/p CABG X 3 in Oct 2015, and osteopenia of spinereferred by Dr. Debbie Day for evaluation of dizziness and falls. She reports that she has had problems with balance since she started steroids started in May of 2018. She reports she wobbles, she staggers, has to really concentrate and know where she is going. She does mention that she feels like her eyes are rolling, there is blurred vision too. She turns in bed and feels something is rolling in her head. She denies nausea. She gets lightheaded when she wakes up. What bothers her the most is her imbalance when walking. She does not feel weaker on one side more than the other. She denies any numbness of her feet. Her glucose levels reportedly have been good. She complains prednisone made her paranoid early on, that it has affected her memory and vision. Denies any probs with walking, memory, dizziness prior to starting steroids. They are currently being weaned. The only neuroimaging available is an MRI of the brain from October 2010, which was done for complaints of \"dizziness, loss of balance. Her speech, CVA\". By report this study was negative for an acute infarct, had a 6 mm right cerebellar cavernoma, with mention of enlargement of the lateral ventricles and third ventricle with some prominence of the CSF spaces in the sylvian fissures which radiology thought raised the possibility of normal pressure hydrocephalus. She also had mild to moderate chronic small vessel disease in the white matter. The last vitamin B12 was from September 2010 at 639. She is on vitamin B12 injections. The last TSH I have available is from October 2015 at 1.59. Other potentially relevantly info is that she is that she has an irregular hearbeat so she is wearing an extended cardiac monitor. Echo in April showed mild LVH, EF at 51-56%.  Holter in April showed she was in afib/flutter 1.29% of the time. She is under the care of Dr. Rika Price, a Cardiologist. I saw a note from Dr. Rika Price acknowledging that ' she was having frequent atrial premature contractions during her Holter monitor she her heart also went out of rhythm briefly, which will need to keep a close eye on. \" She has a f/u with him on June 22.          Social History     Socioeconomic History    Marital status:      Spouse name: Not on file    Number of children: Not on file    Years of education: Not on file    Highest education level: Not on file   Occupational History    Not on file   Social Needs    Financial resource strain: Not on file    Food insecurity:     Worry: Not on file     Inability: Not on file    Transportation needs:     Medical: Not on file     Non-medical: Not on file   Tobacco Use    Smoking status: Never Smoker    Smokeless tobacco: Never Used    Tobacco comment: quit age 26   Substance and Sexual Activity    Alcohol use: No     Frequency: Never    Drug use: No    Sexual activity: Not Currently   Lifestyle    Physical activity:     Days per week: Not on file     Minutes per session: Not on file    Stress: Not on file   Relationships    Social connections:     Talks on phone: Not on file     Gets together: Not on file     Attends Faith service: Not on file     Active member of club or organization: Not on file     Attends meetings of clubs or organizations: Not on file     Relationship status: Not on file    Intimate partner violence:     Fear of current or ex partner: Not on file     Emotionally abused: Not on file     Physically abused: Not on file     Forced sexual activity: Not on file   Other Topics Concern    Not on file   Social History Narrative    Not on file       Family History   Problem Relation Age of Onset    Cancer Mother 48        Breast    Breast Cancer Mother 48    Heart Disease Father     Cancer Sister 76        Breast    Breast Cancer Sister 76    Heart Disease Brother     Diabetes Brother     Diabetes Sister        Current Outpatient Medications   Medication Sig Dispense Refill    predniSONE (DELTASONE) 1 mg tablet Take 4 mg by mouth daily. Indications: rheumatoid arthritis      BABY ASPIRIN PO Take 81 mg by mouth daily.  QUEtiapine (SEROQUEL) 25 mg tablet Take 25 mg by mouth nightly.  metoprolol succinate (TOPROL-XL) 25 mg XL tablet Take 1 Tab by mouth daily. 90 Tab 3    pitavastatin (LIVALO) 1 mg tab tablet Take 1 Tab by mouth daily. 90 Tab 3    MULTIVITAMINS (MULTIVITAMIN PO) Take  by mouth daily.  meloxicam (MOBIC) 7.5 mg tablet Take 2 Tabs by mouth daily. 30 Tab 0    triamterene-hydroCHLOROthiazide (MAXZIDE) 75-50 mg per tablet Take 0.5 Tabs by mouth daily. Past Medical History:   Diagnosis Date    Abnormal finding on EKG 3/26/2014    s/o inf wall mi asymptomatic     Anxiety     Ataxic gait 10/21/2010    Breast cancer ([facility] [address] 05/2014    left side    CAD (coronary artery disease) 10/2015    LM normal, LAD mid 70%, OM1 diffuse 80%, RCA dominant with distal 99% on cardiac catheterization    Cardiac nuclear imaging test 11/29/2016    Low risk. Likely inferior basal prior injury. No ischemia. EF 65%. Mild inferobasal hypk.   Neg EKG on pharm stress test.    Diverticulosis     FHx: breast cancer     GERD (gastroesophageal reflux disease)     Herpes     Hypercholesterolemia     Hypertension     Insomnia     Osteopenia     Osteoporosis screening 2008    WNL per patient, GYN orders    Pap smear 8/2010    GYN, normal    Pre-operative cardiovascular examination 3/26/2014    for shoulder surgery     Rheumatoid arthritis ([facility] [address]     S/P CABG x 3 10/2015    LIMA to LAD, SVG to OM, SVG to distal RCA    S/P partial mastectomy, left, with sentinel lymph node biopsy, 5/11     Screening mammogram 6/2010    GYN orders       Past Surgical History:   Procedure Laterality Date    CARDIAC SURG PROCEDURE UNLIST  10/2015    triple bypass    COLONOSCOPY  09/2008    HX BACK SURGERY  1980    HX BREAST BIOPSY      right (2000), left (2011)    HX HYSTERECTOMY  10/2009    complete, fibroid    HX MASTECTOMY  6./8/11    partial left with sentinel lymph node biopsy    HX MASTECTOMY Left 9/10/2014    LEFT PARTIAL MASTECTOMY performed by Cuco Howard MD at [place] [address]      left lower leg surgery at age 11    HX ORTHOPAEDIC Right 7/2014    ROTATOR CUFF    MT LAP, INCISIONAL HERNIA REPAIR,REDUCIBLE N/A 01/19/2017    Dr. Crys lBanchard       Allergies   Allergen Reactions    Latex Unable to Obtain    Norco [Hydrocodone-Acetaminophen] Swelling     Patient experienced throat swelling, hoarseness and difficulty swallowing.  Adhesive Hives    Codeine Nausea Only     Pt states she is not allergic.        Patient Active Problem List   Diagnosis Code    HTN (hypertension) I10    Hypercholesteremia E78.00    Anxiety F41.9    Vitamin D deficiency E55.9    Ataxic gait R26.0    Insomnia G47.00    Breast cancer ([facility] [address] C50.919    S/P partial mastectomy, left, with sentinel lymph node biopsy, 5/11 Z90.10    FHx: breast cancer Z80.3    Pre-operative cardiovascular examination Z01.810    Abnormal finding on EKG R94.31    Personal history of malignant neoplasm of breast Z85.3    Coronary artery disease involving native coronary artery of native heart with angina pectoris (HCC) I25.119    Coronary artery disease involving native coronary artery of native heart without angina pectoris I25.10    Precordial pain R07.2    Incisional hernia without mention of obstruction or gangrene K43.2    S/P CABG x 3 Z95.1         Review of Systems:   Constitutional: no fever or chills, reports fatigue  Skin denies rash or itching  HEENT:  Denies tinnitus, hearing loss, or visual changes  Respiratory: denies shortness of breath  Cardiovascular: denies chest pain, dyspnea on exertion  Gastrointestinal: does not report nausea or vomiting  Genitourinary: does not report dysuria or incontinence  Musculoskeletal: Reports joint pain or swelling  Endocrine: denies weight change  Hematology: denies easy bruising or bleeding   Neurological: as above in HPI      PHYSICAL EXAMINATION:         Vital signs:    Visit Vitals  /66 (BP 1 Location: Left arm, BP Patient Position: Sitting)   Pulse 84   Temp 99.1 °F (37.3 °C)   Resp 18   Ht 5' 3\" (1.6 m)   Wt 74.4 kg (164 lb)   SpO2 96%   BMI 29.05 kg/m²         GENERAL:                  Well developed, well nourished, in no apparent distress. HEART:                       RR, no murmurs  EXTREMITIES:           No edema is identified. Pulses are +2. HEAD:                         Normocephalic, atraumatic. NEUROLOGIC EXAMINATION       MENTAL STATUS:     Awake, alert, and oriented x 4. Attention and STM are grossly normal. There is no aphasia. Fund of knowledge is adequate. Mood and affect are appropriate  CRANIAL NERVES:   Visual fields are full to confrontation. Pupils are reactive to light and accommodation. Unable to see fundi, status post cataracts OU. Extraocular movements are intact and there is no nystagmus. Facial sensation is normal  Face is symmetrical.   Hard of hearing. SCM/TPZ 5/5  Tongue protrudes midline, palate elevates symmetrically. MOTOR:          5/5 strength throughout, normal tone. CEREBELLAR:           No tremors or dysmetria     SENSORY:     Normal distal pinprick, proprioception, and vibration. Romberg is positive. DTR's:                         +3 throughout with some spread in 1-2 beats of bilateral clonus, no long tract signs                 GAIT:                            Ataxic, narrow based gait with short steps, not myelopathic, decreased clearance of the floor, she staggers to bed when taking 3-4 steps to turn 180 and she is obviously unstable.   She is unable to do tandem gait.      Impression: Her dizziness is essentially an ataxia and imbalance rather than dizziness related to  cardiovascular disease or vestibular dysfunction, as most of the symptoms are when walking. Given what I mentioned in the history there is concern for the possibility she has had progression of cerebrovascular disease which was observed back in 2010, normal pressure hydrocephalus, and the diffuse long track signs also raise concern for a cervical myelopathy. She has history of recently detected paroxysmal atrial fibrillation, with a history of a CABG, HTN, hyperlipidemia and considering her age her CHADS2-VASc2 score is 4, so she is a [address] candidate. Plan: 1Ideally I would like to obtain an MRI of the brain and the cervical spine. She has stainless steel wires on her chest from her CABG. We will ask radiology to tell us whether we may determine if she can have this, if not then we will have to settle for a CT of the head and cervical spine. 2 thyroid function test and B12 levels. 3Discussed the fact she is an [address] candidate. I see no obvious contraindications, perhaps some fall risk. SHe will be seeing Dr. Berny Guzman to f/u on her holter and event monitor results on June 22.   4-F/U after #1. PLEASE NOTE:   Portions of this document may have been produced using voice recognition software. Unrecognized errors in transcription may be present. This note will not be viewable in [address]

## 2019-06-04 NOTE — PROGRESS NOTES
Chief Complaint   Patient presents with    Dizziness     3 most recent PHQ Screens 4/17/2019   PHQ Not Done -   Little interest or pleasure in doing things Not at all   Feeling down, depressed, irritable, or hopeless Not at all   Total Score PHQ 2 0     Fall Risk Assessment, last 12 mths 6/4/2019   Able to walk? Yes   Fall in past 12 months? Yes   Fall with injury?  No   Number of falls in past 12 months 1   Fall Risk Score 1

## 2019-06-05 ENCOUNTER — HOSPITAL ENCOUNTER (OUTPATIENT)
Dept: LAB | Age: 79
Discharge: HOME | End: 2019-06-05
Payer: MEDICARE

## 2019-06-05 DIAGNOSIS — R27.0 ATAXIA: ICD-10-CM

## 2019-06-05 DIAGNOSIS — I67.9 CEREBROVASCULAR DISEASE: ICD-10-CM

## 2019-06-05 DIAGNOSIS — I48.0 PAROXYSMAL ATRIAL FIBRILLATION (HCC): ICD-10-CM

## 2019-06-05 DIAGNOSIS — R41.89 COGNITIVE IMPAIRMENT: ICD-10-CM

## 2019-06-05 LAB
T4 FREE SERPL-MCNC: 1.1 NG/DL (ref 0.7–1.5)
TSH SERPL DL<=0.05 MIU/L-ACNC: 2.65 UIU/ML (ref 0.36–3.74)
VIT B12 SERPL-MCNC: 411 PG/ML (ref 211–911)

## 2019-06-05 PROCEDURE — 82607 VITAMIN B-12: CPT

## 2019-06-05 PROCEDURE — 36415 COLL VENOUS BLD VENIPUNCTURE: CPT

## 2019-06-05 PROCEDURE — 84439 ASSAY OF FREE THYROXINE: CPT

## 2019-06-07 ENCOUNTER — TELEPHONE (OUTPATIENT)
Dept: CARDIOLOGY CLINIC | Age: 79
End: 2019-06-07

## 2019-06-07 RX ORDER — METOPROLOL SUCCINATE 50 MG/1
25 TABLET, EXTENDED RELEASE ORAL
COMMUNITY
End: 2020-02-21 | Stop reason: ALTCHOICE

## 2019-06-07 NOTE — TELEPHONE ENCOUNTER
[employer] called to report patient's first document run of A FIb 150 bpm for 1 minute at 8:35pm.  Reviewed this information with Dr. Berny Guzman. Verbal order and read back per Anderson Cruz MD    Increase torpol xl to 50 mg once daily. Continue to monitor. This has been fully explained to the patient, who indicates understanding.

## 2019-06-13 ENCOUNTER — HOSPITAL ENCOUNTER (OUTPATIENT)
Age: 79
Discharge: HOME | End: 2019-06-13
Attending: PSYCHIATRY & NEUROLOGY
Payer: MEDICARE

## 2019-06-13 DIAGNOSIS — I48.0 PAROXYSMAL ATRIAL FIBRILLATION (HCC): ICD-10-CM

## 2019-06-13 DIAGNOSIS — R27.0 ATAXIA: ICD-10-CM

## 2019-06-13 DIAGNOSIS — I67.9 CEREBROVASCULAR DISEASE: ICD-10-CM

## 2019-06-13 DIAGNOSIS — R41.89 COGNITIVE IMPAIRMENT: ICD-10-CM

## 2019-06-13 PROCEDURE — 70551 MRI BRAIN STEM W/O DYE: CPT

## 2019-06-13 PROCEDURE — 72141 MRI NECK SPINE W/O DYE: CPT

## 2019-06-14 DIAGNOSIS — R00.2 PALPITATIONS: ICD-10-CM

## 2019-06-19 ENCOUNTER — TELEPHONE (OUTPATIENT)
Dept: CARDIOLOGY CLINIC | Age: 79
End: 2019-06-19

## 2019-06-19 DIAGNOSIS — E78.5 DYSLIPIDEMIA: Primary | ICD-10-CM

## 2019-06-19 RX ORDER — ROSUVASTATIN CALCIUM 10 MG/1
10 TABLET, COATED ORAL
Qty: 30 TAB | Refills: 6 | Status: SHIPPED | OUTPATIENT
Start: 2019-06-19 | End: 2020-01-27 | Stop reason: SDUPTHER

## 2019-07-15 ENCOUNTER — OFFICE VISIT (OUTPATIENT)
Dept: NEUROLOGY | Age: 79
End: 2019-07-15

## 2019-07-15 VITALS
HEIGHT: 63 IN | BODY MASS INDEX: 29.06 KG/M2 | RESPIRATION RATE: 18 BRPM | SYSTOLIC BLOOD PRESSURE: 150 MMHG | WEIGHT: 164 LBS | OXYGEN SATURATION: 92 % | DIASTOLIC BLOOD PRESSURE: 70 MMHG | HEART RATE: 72 BPM

## 2019-07-15 DIAGNOSIS — I67.9 CEREBROVASCULAR DISEASE: ICD-10-CM

## 2019-07-15 DIAGNOSIS — R41.89 COGNITIVE IMPAIRMENT: ICD-10-CM

## 2019-07-15 DIAGNOSIS — R27.0 ATAXIA: Primary | ICD-10-CM

## 2019-07-15 DIAGNOSIS — I48.0 PAROXYSMAL ATRIAL FIBRILLATION (HCC): ICD-10-CM

## 2019-07-15 NOTE — PROGRESS NOTES
ROOM # 4    Coretta Vieira presents today for   Chief Complaint   Patient presents with    Follow-up    Results         Visit Vitals  /70 (BP 1 Location: Left arm, BP Patient Position: Sitting)   Pulse 72   Resp 18   Ht 5' 3\" (1.6 m)   Wt 164 lb (74.4 kg)   SpO2 92%   BMI 29.05 kg/m²         Advance Directive:  1. Do you have an advance directive in place? Patient Reply: No    2. If not, would you like material regarding how to put one in place? Patient Reply: No    Coordination of Care:  1. Have you been to the ER, urgent care clinic since your last visit? Hospitalized since your last visit?  No

## 2019-07-15 NOTE — LETTER
7/15/19 Patient: Florentin Campos  
YOB: 1940 Date of Visit: 7/15/2019 Ellen Quach MD 
[address] [place] [address] VIA Facsimile: [phone] Dear Ellen Quach MD, Thank you for referring Ms. Lorraine Sevilla to [facility] for evaluation. My notes for this consultation are attached. If you have questions, please do not hesitate to call me. I look forward to following your patient along with you.  
 
 
Sincerely, 
 
Jaye Buchanan MD

## 2019-07-15 NOTE — PROGRESS NOTES
7/15/2019 8:56 AM    SSN: [id number]    Subjective:   50-year-old female who I saw in June for evaluation of dizziness. Essentially she had a problem of ataxia and had concerns for progression of cerebrovascular disease as well as the possibility of normal pressure hydrocephalus and a cervical myelopathy, the latter because of presence of some diffuse long tract signs. Given her history and a CHADS2-VASc2 score of 4, I thought she was on oral anticoagulation candidate. We had reviewed vitamin B12 levels and thyroid function tests that had been normal.  She is on vitamin B12 injections. An MRI of the brain that showed moderate cerebral cortical atrophy with ventricular enlargement believed to be unchanged from 2010 in proportion to the amount of atrophy, along with moderate periventricular white matter patchy signal anomalies consistent with chronic microvascular ischemia. MRI of the cervical spine showed degenerative changes at C3-C7 with mild spinal canal stenosis, but no cord signal anomalies.     Social History     Socioeconomic History    Marital status:      Spouse name: Not on file    Number of children: Not on file    Years of education: Not on file    Highest education level: Not on file   Occupational History    Not on file   Social Needs    Financial resource strain: Not on file    Food insecurity:     Worry: Not on file     Inability: Not on file    Transportation needs:     Medical: Not on file     Non-medical: Not on file   Tobacco Use    Smoking status: Never Smoker    Smokeless tobacco: Never Used    Tobacco comment: quit age 26   Substance and Sexual Activity    Alcohol use: No     Frequency: Never    Drug use: No    Sexual activity: Not Currently   Lifestyle    Physical activity:     Days per week: Not on file     Minutes per session: Not on file    Stress: Not on file   Relationships    Social connections:     Talks on phone: Not on file Gets together: Not on file     Attends Rest[employer] service: Not on file     Active member of club or organization: Not on file     Attends meetings of clubs or organizations: Not on file     Relationship status: Not on file    Intimate partner violence:     Fear of current or ex partner: Not on file     Emotionally abused: Not on file     Physically abused: Not on file     Forced sexual activity: Not on file   Other Topics Concern    Not on file   Social History Narrative    Not on file       Family History   Problem Relation Age of Onset    Cancer Mother 48        Breast    Breast Cancer Mother 48    Heart Disease Father     Cancer Sister 76        Breast    Breast Cancer Sister 76    Heart Disease Brother     Diabetes Brother     Diabetes Sister        Current Outpatient Medications   Medication Sig Dispense Refill    rosuvastatin (CRESTOR) 10 mg tablet Take 1 Tab by mouth nightly. 30 Tab 6    metoprolol succinate (TOPROL XL) 50 mg XL tablet Take  by mouth daily.  predniSONE (DELTASONE) 1 mg tablet Take 4 mg by mouth daily. Indications: rheumatoid arthritis      BABY ASPIRIN PO Take 81 mg by mouth daily.  MULTIVITAMINS (MULTIVITAMIN PO) Take  by mouth daily.  QUEtiapine (SEROQUEL) 25 mg tablet Take 25 mg by mouth nightly.  meloxicam (MOBIC) 7.5 mg tablet Take 2 Tabs by mouth daily. 30 Tab 0    triamterene-hydroCHLOROthiazide (MAXZIDE) 75-50 mg per tablet Take 0.5 Tabs by mouth daily. Past Medical History:   Diagnosis Date    Abnormal finding on EKG 3/26/2014    s/o inf wall mi asymptomatic     Anxiety     Ataxic gait 10/21/2010    Breast cancer ([facility] [address] 05/2014    left side    CAD (coronary artery disease) 10/2015    LM normal, LAD mid 70%, OM1 diffuse 80%, RCA dominant with distal 99% on cardiac catheterization    Cardiac nuclear imaging test 11/29/2016    Low risk. Likely inferior basal prior injury. No ischemia. EF 65%. Mild inferobasal hypk.   Neg EKG on pharm stress test.    Diverticulosis     FHx: breast cancer     GERD (gastroesophageal reflux disease)     Herpes     Hypercholesterolemia     Hypertension     Insomnia     Osteopenia     Osteoporosis screening 2008    WNL per patient, GYN orders    Pap smear 8/2010    GYN, normal    Pre-operative cardiovascular examination 3/26/2014    for shoulder surgery     Rheumatoid arthritis ([facility] [address]     S/P CABG x 3 10/2015    LIMA to LAD, SVG to OM, SVG to distal RCA    S/P partial mastectomy, left, with sentinel lymph node biopsy, 5/11     Screening mammogram 6/2010    GYN orders       Past Surgical History:   Procedure Laterality Date    CARDIAC SURG PROCEDURE UNLIST  10/2015    triple bypass    COLONOSCOPY  09/2008    HX BACK SURGERY  1980    HX BREAST BIOPSY      right (2000), left (2011)    HX HYSTERECTOMY  10/2009    complete, fibroid    HX MASTECTOMY  6./8/11    partial left with sentinel lymph node biopsy    HX MASTECTOMY Left 9/10/2014    LEFT PARTIAL MASTECTOMY performed by Sonya Christiansen MD at [place] [address]      left lower leg surgery at age 11   [facility] ORTHOPAEDIC Right 7/2014    ROTATOR CUFF    DE LAP, INCISIONAL HERNIA REPAIR,REDUCIBLE N/A 01/19/2017    Dr. Miguel Odom       Allergies   Allergen Reactions    Latex Unable to Obtain    Norco [Hydrocodone-Acetaminophen] Swelling     Patient experienced throat swelling, hoarseness and difficulty swallowing.  Adhesive Hives    Codeine Nausea Only     Pt states she is not allergic. Vital signs:    Visit Vitals  /70 (BP 1 Location: Left arm, BP Patient Position: Sitting)   Pulse 72   Resp 18   Ht 5' 3\" (1.6 m)   Wt 74.4 kg (164 lb)   LMP  (LMP Unknown)   SpO2 92%   BMI 29.05 kg/m²       Review of Systems:   GENERAL: Denies fever or fatigue  CARDIAC: No CP or SOB  PULMONARY: No cough of SOB  MUSCULOSKELETAL: No new joint pain  NEURO: SEE HPI      EXAM: Alert, in NAD. Heart is regular.  Oriented x3, EOM's are full, PERRL, no facial asymmetries. Strength and tone are normal. 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 I counseled her about addressing all these factors that may be modified. Once again, given a CHADS2-VASc2 score of 4 and her paroxysmal atrial fibrillation, I think that she ought to be on oral anticoagulant therapy. She will be following up with her primary care physician soon as well as with her cardiologist, Dr. Charanjit Jurado, in August.  I have no further neurological work-up at this point, so she will return to see me as needed. PLEASE NOTE:   Portions of this document may have been produced using voice recognition software. Unrecognized errors in transcription may be present. This note will not be viewable in [address]

## 2019-08-15 ENCOUNTER — OFFICE VISIT (OUTPATIENT)
Dept: CARDIOLOGY CLINIC | Age: 79
End: 2019-08-15

## 2019-08-15 VITALS
BODY MASS INDEX: 29.41 KG/M2 | HEIGHT: 63 IN | HEART RATE: 86 BPM | OXYGEN SATURATION: 97 % | SYSTOLIC BLOOD PRESSURE: 130 MMHG | DIASTOLIC BLOOD PRESSURE: 80 MMHG | WEIGHT: 166 LBS

## 2019-08-15 DIAGNOSIS — I10 ESSENTIAL HYPERTENSION: ICD-10-CM

## 2019-08-15 DIAGNOSIS — E78.00 HYPERCHOLESTEREMIA: ICD-10-CM

## 2019-08-15 DIAGNOSIS — I48.0 PAF (PAROXYSMAL ATRIAL FIBRILLATION) (HCC): Primary | ICD-10-CM

## 2019-08-15 DIAGNOSIS — I25.119 CORONARY ARTERY DISEASE INVOLVING NATIVE CORONARY ARTERY OF NATIVE HEART WITH ANGINA PECTORIS (HCC): ICD-10-CM

## 2019-08-15 DIAGNOSIS — Z95.1 S/P CABG X 3: ICD-10-CM

## 2019-08-15 NOTE — PROGRESS NOTES
HISTORY OF PRESENT ILLNESS  Ora Sharpe is a 77 y.o. female. Shortness of Breath   Pertinent negatives include no fever, no headaches, no cough, no wheezing, no PND, no orthopnea, no chest pain, no vomiting, no abdominal pain, no rash, no leg swelling and no claudication. Follow-up   Associated symptoms include shortness of breath. Pertinent negatives include no chest pain, no abdominal pain and no headaches. Dizziness   Associated symptoms include shortness of breath. Pertinent negatives include no chest pain, no abdominal pain and no headaches. Patient presents for a follow-up office visit. She was previously followed by Dr. Meli Holliday. She has a past medical history significant for coronary disease with a prior inferolateral myocardial infarction, found to have three-vessel coronary disease on cardiac catheterization in October 2015, subsequently undergoing three-vessel bypass surgery later that month using a LIMA to LAD, SVG to OM and SVG to distal RCA at [facility]. Patient last underwent noninvasive cardiac imaging in April 2018 including a pharmacologic nuclear stress test and an echocardiogram her stress test was negative for ischemia or infarction, EF 64%. Her echocardiogram demonstrated preserved LV systolic function, grade 1 diastolic dysfunction, a sclerotic aortic valve without stenosis with mild to moderate aortic insufficiency. Patient underwent a repeat echocardiogram in April 2019 which showed a low normal left ventricular ejection fraction of 51 to 55% with mild concentric LVH but no valvular heart disease. She then wore a Holter monitor and a 30-day event monitor in May and June 2019 to evaluate her heart palpitations. She was found to have several episodes of atrial fibrillation with heart rates up to 140 bpm, longest episode lasted for 2 hours. Patient reports that she was symptomatic with the episodes. She was last seen in our office 1 months ago.   Since that time she has had no recurrent heart palpitations since the episodes in June of this year. She denies any chest pain. No worsening shortness of breath from baseline. No worsening dizzy spells, no lightheadedness, no syncope or near syncope. Past Medical History:   Diagnosis Date    Abnormal finding on EKG 3/26/2014    s/o inf wall mi asymptomatic     Anxiety     Ataxic gait 10/21/2010    Breast cancer ([facility] 05/2014    left side    CAD (coronary artery disease) 10/2015    LM normal, LAD mid 70%, OM1 diffuse 80%, RCA dominant with distal 99% on cardiac catheterization    Cardiac nuclear imaging test 11/29/2016    Low risk. Likely inferior basal prior injury. No ischemia. EF 65%. Mild inferobasal hypk. Neg EKG on pharm stress test.    Diverticulosis     FHx: breast cancer     GERD (gastroesophageal reflux disease)     Herpes     Hypercholesterolemia     Hypertension     Insomnia     Osteopenia     Osteoporosis screening 2008    WNL per patient, GYN orders    PAF (paroxysmal atrial fibrillation) ([facility] 06/07/2019    Diagnosed on 30-day event monitor    Pap smear 8/2010    GYN, normal    Pre-operative cardiovascular examination 3/26/2014    for shoulder surgery     Rheumatoid arthritis ([facility]     S/P CABG x 3 10/2015    LIMA to LAD, SVG to OM, SVG to distal RCA    S/P partial mastectomy, left, with sentinel lymph node biopsy, 5/11     Screening mammogram 6/2010    GYN orders     Current Outpatient Medications   Medication Sig Dispense Refill    rosuvastatin (CRESTOR) 10 mg tablet Take 1 Tab by mouth nightly. 30 Tab 6    metoprolol succinate (TOPROL XL) 50 mg XL tablet Take  by mouth daily.  predniSONE (DELTASONE) 1 mg tablet Take 4 mg by mouth daily. Indications: rheumatoid arthritis      BABY ASPIRIN PO Take 81 mg by mouth daily.  QUEtiapine (SEROQUEL) 25 mg tablet Take 25 mg by mouth nightly.  meloxicam (MOBIC) 7.5 mg tablet Take 2 Tabs by mouth daily.  30 Tab 0    triamterene-hydroCHLOROthiazide (MAXZIDE) 75-50 mg per tablet Take 0.5 Tabs by mouth daily.  MULTIVITAMINS (MULTIVITAMIN PO) Take  by mouth daily. Allergies   Allergen Reactions    Latex Unable to Obtain    Norco [Hydrocodone-Acetaminophen] Swelling     Patient experienced throat swelling, hoarseness and difficulty swallowing.  Adhesive Hives    Codeine Nausea Only     Pt states she is not allergic. Social History     Tobacco Use    Smoking status: Never Smoker    Smokeless tobacco: Never Used    Tobacco comment: quit age 26   Substance Use Topics    Alcohol use: No     Frequency: Never    Drug use: No     Family History   Problem Relation Age of Onset   [facility] Cancer Mother 48        Breast   [facility] Breast Cancer Mother 48    Heart Disease Father     Cancer Sister 76        Breast    Breast Cancer Sister 76    Heart Disease Brother     Diabetes Brother     Diabetes Sister          Review of Systems   Constitutional: Negative for chills, fever and weight loss. HENT: Negative for nosebleeds. Eyes: Negative for blurred vision and double vision. Respiratory: Positive for shortness of breath. Negative for cough and wheezing. Cardiovascular: Negative for chest pain, palpitations, orthopnea, claudication, leg swelling and PND. Gastrointestinal: Negative for abdominal pain, heartburn, nausea and vomiting. Genitourinary: Negative for dysuria and hematuria. Musculoskeletal: Negative for falls and myalgias. Skin: Negative for rash. Neurological: Positive for dizziness. Negative for focal weakness and headaches. Endo/Heme/Allergies: Does not bruise/bleed easily. Psychiatric/Behavioral: Negative for substance abuse. Visit Vitals  /80 (BP 1 Location: Left arm, BP Patient Position: Sitting)   Pulse 86   Ht 5' 3\" (1.6 m)   Wt 75.3 kg (166 lb)   LMP  (LMP Unknown)   SpO2 97%   BMI 29.41 kg/m²       Physical Exam   Constitutional: She is oriented to person, place, and time. She appears well-developed and well-nourished. HENT:   Head: Normocephalic and atraumatic. Eyes: Conjunctivae are normal.   Neck: Neck supple. No JVD present. Carotid bruit is not present. Cardiovascular: Regular rhythm, S1 normal, S2 normal and normal pulses. No extrasystoles are present. Tachycardia present. Exam reveals distant heart sounds. Exam reveals no gallop. No murmur heard. Pulmonary/Chest: Effort normal. She has no decreased breath sounds. She has no wheezes. She has no rhonchi. She has no rales. Abdominal: Soft. Bowel sounds are normal. There is no tenderness. Musculoskeletal: She exhibits no edema. Neurological: She is alert and oriented to person, place, and time. Skin: Skin is warm and dry. ASSESSMENT and PLAN    Paroxysmal atrial fibrillation. This was documented on both Holter monitor and an echocardiogram in May/June 2019. Her longest episode lasted for 2 hours for which she was symptomatic. Her heart rates were about 140 bpm.  Her metoprolol dosage was increased to 50 mg daily. She did not wish to be started on anticoagulation. She has had no recurrent heart palpitations for the past 2 months. I did discuss at length the risk of having a stroke with atrial fibrillation. Patient would be willing to start an anticoagulant if she does have a recurrent episode in the future. Coronary artery disease. Status post three-vessel bypass surgery in October 2015 using a LIMA to LAD, SVG to OM, and SVG to distal RCA. She last underwent a pharmacological nuclear stress test in April 2018 which is a low risk study. She remains on aspirin, statin and beta-blocker, which I would continue. No new symptoms concerning for angina. Essential hypertension. Patient blood pressure appears reasonable on her current medical regimen, which I would continue. Dyslipidemia. Patient has been [place], and this is followed closely by her PCP.   Her LDL should be less than 70 from a cardiac standpoint. Follow-up in 6 months, sooner if needed.

## 2019-08-15 NOTE — PROGRESS NOTES
Billydora Lundy presents today for   Chief Complaint   Patient presents with    Coronary Artery Disease     4 month follow up     Shortness of Breath     exertion    Dizziness     sometimes        Claudean Hall preferred language for health care discussion is english/other. Is someone accompanying this pt? no    Is the patient using any DME equipment during [address]? no    Depression Screening:  3 most recent PHQ Screens 4/17/2019   PHQ Not Done -   Little interest or pleasure in doing things Not at all   Feeling down, depressed, irritable, or hopeless Not at all   Total Score PHQ 2 0       Learning Assessment:  Learning Assessment 4/17/2019   PRIMARY LEARNER Patient   HIGHEST LEVEL OF EDUCATION - PRIMARY LEARNER  -   BARRIERS PRIMARY LEARNER -   PRIMARY LANGUAGE ENGLISH   LEARNER PREFERENCE PRIMARY DEMONSTRATION     -     -     -   ANSWERED BY Patient   RELATIONSHIP SELF       Abuse Screening:  Abuse Screening Questionnaire 3/28/2014   Do you ever feel afraid of your partner? N   Are you in a relationship with someone who physically or mentally threatens you? N   Is it safe for you to go home? Y       Fall Risk  Fall Risk Assessment, last 12 mths 6/4/2019   Able to walk? Yes   Fall in past 12 months? Yes   Fall with injury? No   Number of falls in past 12 months 1   Fall Risk Score 1       Pt currently taking Anticoagulant therapy? ASA 81mg every day     Coordination of Care:  1. Have you been to the ER, urgent care clinic since your last visit? Hospitalized since your last visit? no    2. Have you seen or consulted any other health care providers outside of the [address] [place] [address] since your last visit? Include any pap smears or colon screening.  no

## 2019-10-25 ENCOUNTER — OFFICE VISIT (OUTPATIENT)
Dept: SURGERY | Age: 79
End: 2019-10-25

## 2019-10-25 VITALS
DIASTOLIC BLOOD PRESSURE: 83 MMHG | WEIGHT: 165 LBS | HEIGHT: 63 IN | OXYGEN SATURATION: 94 % | SYSTOLIC BLOOD PRESSURE: 128 MMHG | HEART RATE: 71 BPM | BODY MASS INDEX: 29.23 KG/M2 | TEMPERATURE: 96.9 F

## 2019-10-25 DIAGNOSIS — K43.2 INCISIONAL HERNIA, WITHOUT OBSTRUCTION OR GANGRENE: Primary | ICD-10-CM

## 2019-10-25 RX ORDER — GLUCOSAMINE SULFATE 1500 MG
POWDER IN PACKET (EA) ORAL DAILY
COMMUNITY

## 2019-10-25 NOTE — PATIENT INSTRUCTIONS
Abdominal Hernia Repair: Before Your Surgery  What is abdominal hernia repair surgery? An abdominal hernia repair is a type of surgery. It fixes a problem called a hernia. A hernia is a bulge under the skin in your belly. It happens when you have a weak spot in your belly muscles and a piece of your intestines or tissues pokes through your muscles. This can cause pain. You may notice the pain most when you lift something heavy. You can have a hernia near your belly button. Or it may be in a scar from an earlier surgery. To fix it, the doctor will do one of two kinds of surgery. In open surgery, the doctor makes one cut near the hernia. This cut is called an incision. In laparoscopic surgery, the doctor makes several very small incisions and uses a thin, lighted scope and small tools. In either type of surgery, the doctor pushes the bulge back in place, if needed. Then the doctor sews the healthy tissue back together. Often the doctor patches the weak spot with a piece of material.  Laparoscopic surgery leaves several small scars. Open surgery leaves one long scar. The scars fade with time. You will probably need to take 1 to 2 weeks off from work. But if your job requires heavy lifting or other physical work, you may need to take 4 to 6 weeks off. Follow-up care is a key part of your treatment and safety. Be sure to make and go to all appointments, and call your doctor if you are having problems. It's also a good idea to know your test results and keep a list of the medicines you take. What happens before surgery?   Surgery can be stressful. This information will help you understand what you can expect. And it will help you safely prepare for surgery.   Preparing for surgery    · Understand exactly what surgery is planned, along with the risks, benefits, and other options. · Tell your doctors ALL the medicines, vitamins, supplements, and herbal remedies you take.  Some of these can increase the risk of bleeding or interact with anesthesia.     · If you take blood thinners, such as warfarin (Coumadin), clopidogrel (Plavix), or aspirin, be sure to talk to your doctor. He or she will tell you if you should stop taking these medicines before your surgery. Make sure that you understand exactly what your doctor wants you to do.     · Your doctor will tell you which medicines to take or stop before your surgery. You may need to stop taking certain medicines a week or more before surgery. So talk to your doctor as soon as you can.     · If you have an advance directive, let your doctor know. It may include a living will and a durable power of  for health care. Bring a copy to the hospital. If you don't have one, you may want to prepare one. It lets your doctor and loved ones know your health care wishes. Doctors advise that everyone prepare these papers before any type of surgery or procedure. What happens on the day of surgery? · Follow the instructions exactly about when to stop eating and drinking. If you don't, your surgery may be canceled. If your doctor told you to take your medicines on the day of surgery, take them with only a sip of water.     · Take a bath or shower before you come in for your surgery. Do not apply lotions, perfumes, deodorants, or nail polish.     · Do not shave the surgical site yourself.     · Take off all jewelry and piercings. And take out contact lenses, if you wear them.    At the hospital or surgery center   · Bring a picture ID.     · The area for surgery is often marked to make sure there are no errors.     · You will be kept comfortable and safe by your anesthesia provider. You will be asleep during the surgery.     · The surgery will take 30 minutes to 2 hours. It depends on how large the hernia is and where it is. Going home   · Be sure you have someone to drive you home.  Anesthesia and pain medicine make it unsafe for you to drive.     · You will be given more specific instructions about recovering from your surgery. They will cover things like diet, wound care, follow-up care, driving, and getting back to your normal routine. When should you call your doctor? · You have questions or concerns.     · You don't understand how to prepare for your surgery.     · You become ill before the surgery (such as fever, flu, or a cold).     · You need to reschedule or have changed your mind about having the surgery. Where can you learn more? Go to http://obinna-luda.info/. Enter U288 in the search box to learn more about \"Abdominal Hernia Repair: Before Your Surgery. \"  Current as of: November 7, 2018  Content Version: 12.2  © 8926-7026 [employer]. Care instructions adapted under license by [employer] (which disclaims liability or warranty for this information). If you have questions about a medical condition or this instruction, always ask your healthcare professional. [address] any warranty or liability for your use of this information.

## 2019-10-25 NOTE — LETTER
10/25/19 Patient: Shayy Cabrera  
YOB: 1940 Date of Visit: 10/25/2019 Avelina Gomez MD 
[address] [place] [address] VIA Facsimile: [phone] Dear Avelina Gomez MD, Thank you for referring Ms. Daniel Haro to [facility] for evaluation. My notes for this consultation are attached. If you have questions, please do not hesitate to call me. I look forward to following your patient along with you. Sincerely, Genny Leon MD

## 2019-10-25 NOTE — PROGRESS NOTES
[facility]  General Surgery    Name: Kathy Sewell MRN: [id number]   : 1940 Hospital: [facility]   Date: 10/25/2019 Admission Date: No admission date for patient encounter. Subjective:  Patient presents today with complaints of sharp left upper quadrant abdominal pain which started last weekend and lasted until Monday for 3 days. She states that the pain was constant and severe. She could hardly breathe. It felt like a knife. She had the pain again last night it lasted for 20 minutes. She had nausea last week prior to having the pain. She saw her primary medical doctor at this week who referred her to us for further evaluation. I did see the patient for this epigastric abdominal pain 2017. A CT of the abdomen pelvis was ordered at that that time. No hernia was identified. Objective:  Vitals:    10/25/19 1126   BP: 128/83   Pulse: 71   Temp: 96.9 °F (36.1 °C)   TempSrc: Oral   SpO2: 94%   Weight: 74.8 kg (165 lb)   Height: 5' 3\" (1.6 m)       Physical Exam:    General: Awake alert, oriented x4, no apparent distress   Abdomen: abdomen is soft with bulge in the epigastrium above the chest tube sites from her open heart surgery. There is tenderness at the bulge. With cough it is unclear if her hernia is present. Incision(s) are C/D/I. No masses, organomegaly or guarding    Current Medications:  Current Outpatient Medications   Medication Sig Dispense Refill    cholecalciferol (VITAMIN D3) 1,000 unit cap Take  by mouth daily.  rosuvastatin (CRESTOR) 10 mg tablet Take 1 Tab by mouth nightly. 30 Tab 6    metoprolol succinate (TOPROL XL) 50 mg XL tablet Take  by mouth daily.  predniSONE (DELTASONE) 1 mg tablet Take 4 mg by mouth daily. Indications: rheumatoid arthritis      BABY ASPIRIN PO Take 81 mg by mouth daily.  QUEtiapine (SEROQUEL) 25 mg tablet Take 25 mg by mouth nightly.       meloxicam (MOBIC) 7.5 mg tablet Take 2 Tabs by mouth daily. 30 Tab 0    triamterene-hydroCHLOROthiazide (MAXZIDE) 75-50 mg per tablet Take 0.5 Tabs by mouth daily.  MULTIVITAMINS (MULTIVITAMIN PO) Take  by mouth daily. Chart and notes reviewed. Data reviewed. I have evaluated and examined the patient. IMPRESSION:   · Patient with possible incisional hernia in the epigastrium which is causing pain. This could also be diastases. PLAN:/DISCUSION:   · CT abdomen pelvis to evaluate for hernia.   · Follow-up in 2 weeks        Jessica Austin MD

## 2019-11-06 ENCOUNTER — HOSPITAL ENCOUNTER (OUTPATIENT)
Dept: CT IMAGING | Age: 79
Discharge: HOME | End: 2019-11-06
Attending: SURGERY
Payer: MEDICARE

## 2019-11-06 DIAGNOSIS — K43.2 INCISIONAL HERNIA, WITHOUT OBSTRUCTION OR GANGRENE: ICD-10-CM

## 2019-11-06 LAB — CREAT UR-MCNC: 0.9 MG/DL (ref 0.6–1.3)

## 2019-11-06 PROCEDURE — 74177 CT ABD & PELVIS W/CONTRAST: CPT

## 2019-11-06 PROCEDURE — 74011636320 HC RX REV CODE- 636/320: Performed by: SURGERY

## 2019-11-06 PROCEDURE — 82565 ASSAY OF CREATININE: CPT

## 2019-11-06 RX ADMIN — IOPAMIDOL 100 ML: 612 INJECTION, SOLUTION INTRAVENOUS at 11:05

## 2019-11-08 ENCOUNTER — OFFICE VISIT (OUTPATIENT)
Dept: SURGERY | Age: 79
End: 2019-11-08

## 2019-11-08 VITALS
TEMPERATURE: 97 F | RESPIRATION RATE: 17 BRPM | HEART RATE: 71 BPM | WEIGHT: 163 LBS | OXYGEN SATURATION: 96 % | BODY MASS INDEX: 28.88 KG/M2 | DIASTOLIC BLOOD PRESSURE: 73 MMHG | HEIGHT: 63 IN | SYSTOLIC BLOOD PRESSURE: 139 MMHG

## 2019-11-08 DIAGNOSIS — K43.9 EPIGASTRIC HERNIA: Primary | ICD-10-CM

## 2019-11-11 ENCOUNTER — HOSPITAL ENCOUNTER (OUTPATIENT)
Dept: GENERAL RADIOLOGY | Age: 79
Discharge: HOME | End: 2019-11-11
Payer: MEDICARE

## 2019-11-11 ENCOUNTER — HOSPITAL ENCOUNTER (OUTPATIENT)
Dept: LAB | Age: 79
Discharge: HOME | End: 2019-11-11
Payer: MEDICARE

## 2019-11-11 DIAGNOSIS — K43.9 EPIGASTRIC HERNIA: ICD-10-CM

## 2019-11-11 LAB
ANION GAP SERPL CALC-SCNC: 4 MMOL/L (ref 3–18)
ATRIAL RATE: 69 BPM
BASOPHILS # BLD: 0 K/UL (ref 0–0.1)
BASOPHILS NFR BLD: 1 % (ref 0–2)
BUN SERPL-MCNC: 20 MG/DL (ref 7–18)
BUN/CREAT SERPL: 17 (ref 12–20)
CALCIUM SERPL-MCNC: 9.1 MG/DL (ref 8.5–10.1)
CALCULATED P AXIS, ECG09: 28 DEGREES
CALCULATED R AXIS, ECG10: -3 DEGREES
CALCULATED T AXIS, ECG11: 28 DEGREES
CHLORIDE SERPL-SCNC: 108 MMOL/L (ref 100–111)
CO2 SERPL-SCNC: 31 MMOL/L (ref 21–32)
CREAT SERPL-MCNC: 1.18 MG/DL (ref 0.6–1.3)
DIAGNOSIS, 93000: NORMAL
DIFFERENTIAL METHOD BLD: ABNORMAL
EOSINOPHIL # BLD: 0.2 K/UL (ref 0–0.4)
EOSINOPHIL NFR BLD: 4 % (ref 0–5)
ERYTHROCYTE [DISTWIDTH] IN BLOOD BY AUTOMATED COUNT: 15.5 % (ref 11.6–14.5)
GLUCOSE SERPL-MCNC: 96 MG/DL (ref 74–99)
HCT VFR BLD AUTO: 44 % (ref 35–45)
HGB BLD-MCNC: 14.4 G/DL (ref 12–16)
LYMPHOCYTES # BLD: 2.5 K/UL (ref 0.9–3.6)
LYMPHOCYTES NFR BLD: 41 % (ref 21–52)
MCH RBC QN AUTO: 30.4 PG (ref 24–34)
MCHC RBC AUTO-ENTMCNC: 32.7 G/DL (ref 31–37)
MCV RBC AUTO: 93 FL (ref 74–97)
MONOCYTES # BLD: 0.5 K/UL (ref 0.05–1.2)
MONOCYTES NFR BLD: 7 % (ref 3–10)
NEUTS SEG # BLD: 3 K/UL (ref 1.8–8)
NEUTS SEG NFR BLD: 47 % (ref 40–73)
P-R INTERVAL, ECG05: 200 MS
PLATELET # BLD AUTO: 246 K/UL (ref 135–420)
PMV BLD AUTO: 9.6 FL (ref 9.2–11.8)
POTASSIUM SERPL-SCNC: 4.2 MMOL/L (ref 3.5–5.5)
Q-T INTERVAL, ECG07: 424 MS
QRS DURATION, ECG06: 76 MS
QTC CALCULATION (BEZET), ECG08: 454 MS
RBC # BLD AUTO: 4.73 M/UL (ref 4.2–5.3)
SODIUM SERPL-SCNC: 143 MMOL/L (ref 136–145)
VENTRICULAR RATE, ECG03: 69 BPM
WBC # BLD AUTO: 6.2 K/UL (ref 4.6–13.2)

## 2019-11-11 PROCEDURE — 93005 ELECTROCARDIOGRAM TRACING: CPT

## 2019-11-11 PROCEDURE — 71046 X-RAY EXAM CHEST 2 VIEWS: CPT

## 2019-11-11 PROCEDURE — 36415 COLL VENOUS BLD VENIPUNCTURE: CPT

## 2019-11-11 PROCEDURE — 80048 BASIC METABOLIC PNL TOTAL CA: CPT

## 2019-11-11 PROCEDURE — 85025 COMPLETE CBC W/AUTO DIFF WBC: CPT

## 2019-11-14 ENCOUNTER — ANESTHESIA EVENT (OUTPATIENT)
Dept: SURGERY | Age: 79
End: 2019-11-14
Payer: MEDICARE

## 2019-11-17 NOTE — H&P (VIEW-ONLY)
[facility] General Surgery Subjective: HPI: Patient is a very pleasant [de-identified] female with a past medical history remarkable for hypertension, hypercholesterolemia, breast cancer status post left mastectomy with sentinel lymph node biopsy in 2011, coronary artery disease status post coronary artery bypass grafting in 2018 and paroxysmal atrial fibrillation. Her recent CAT scan abdomen pelvis performed November 6, 2019 confirms an epigastric hernia. The patient has been having pain at the epigastric hernia site for several months. Patient Active Problem List  
 Diagnosis Date Noted  PAF (paroxysmal atrial fibrillation) ([facility] [address] 08/15/2019  S/P CABG x 3 04/12/2018  Incisional hernia without mention of obstruction or gangrene 01/19/2017  Coronary artery disease involving native coronary artery of native heart without angina pectoris 12/16/2016  Precordial pain 12/16/2016  Coronary artery disease involving native coronary artery of native heart with angina pectoris ([facility] [address] 10/30/2016  Personal history of malignant neoplasm of breast 03/29/2016  Pre-operative cardiovascular examination 03/26/2014  Abnormal finding on EKG 03/26/2014  S/P partial mastectomy, left, with sentinel lymph node biopsy, 5/11  FHx: breast cancer  Breast cancer ([facility] [address] 12/06/2011  Insomnia 09/13/2011  Ataxic gait 10/21/2010  Vitamin D deficiency 09/14/2010  
 HTN (hypertension) 03/27/2010  Hypercholesteremia 03/27/2010  Anxiety 03/27/2010 Past Medical History:  
Diagnosis Date  Abnormal finding on EKG 3/26/2014  
 s/o inf wall mi asymptomatic  Anxiety  Ataxic gait 10/21/2010  Breast cancer ([facility] [address] 05/2014  
 left side  CAD (coronary artery disease) 10/2015 LM normal, LAD mid 70%, OM1 diffuse 80%, RCA dominant with distal 99% on cardiac catheterization  Cardiac nuclear imaging test 11/29/2016 Low risk. Likely inferior basal prior injury. No ischemia. EF 65%. Mild inferobasal hypk. Neg EKG on pharm stress test.  
 Diverticulosis  FHx: breast cancer  GERD (gastroesophageal reflux disease)  Herpes  Hypercholesterolemia  Hypertension  Insomnia  Osteopenia  Osteoporosis screening 2008 WNL per patient, GYN orders  PAF (paroxysmal atrial fibrillation) ([facility] [address] 06/07/2019 Diagnosed on 30-day event monitor  Pap smear 8/2010 GYN, normal  
 Pre-operative cardiovascular examination 3/26/2014  
 for shoulder surgery  Rheumatoid arthritis ([facility] [address]  S/P CABG x 3 10/2015 LIMA to LAD, SVG to OM, SVG to distal RCA  
 S/P partial mastectomy, left, with sentinel lymph node biopsy, 5/11  Screening mammogram 6/2010 GYN orders Past Surgical History:  
Procedure Laterality Date  CARDIAC SURG PROCEDURE UNLIST  10/2015  
 triple bypass  COLONOSCOPY  09/2008 [place]  HX BREAST BIOPSY    
 right (2000), left (2011)  HX HYSTERECTOMY  10/2009  
 complete, fibroid  HX MASTECTOMY  6./8/11  
 partial left with sentinel lymph node biopsy  HX MASTECTOMY Left 9/10/2014 LEFT PARTIAL MASTECTOMY performed by Lizette Bueno MD at [facility] MAIN OR  
 [facility]    
 left lower leg surgery at age 11  
 HX ORTHOPAEDIC Right 7/2014 ROTATOR CUFF  
 [place] LAP, INCISIONAL HERNIA REPAIR,REDUCIBLE N/A 01/19/2017 Dr. Shaniqua Cervantes Family History Problem Relation Age of Onset  Cancer Mother 48 Breast  
 Breast Cancer Mother 48  
 Heart Disease Father  Cancer Sister 76 Breast  
 Breast Cancer Sister 76  
 Heart Disease Brother  Diabetes Brother  Diabetes Sister Social History Tobacco Use  Smoking status: Never Smoker  Smokeless tobacco: Never Used  Tobacco comment: quit age 26 Substance Use Topics  Alcohol use: No  
  Frequency: Never Allergies Allergen Reactions  Latex Unable to Obtain To tape with latex  Norco [Hydrocodone-Acetaminophen] Swelling Patient experienced throat swelling, hoarseness and difficulty swallowing.  Adhesive Hives  Codeine Nausea Only Pt states she is not allergic. Prior to Admission medications Medication Sig Start Date End Date Taking? Authorizing Provider  
cholecalciferol (VITAMIN D3) 1,000 unit cap Take  by mouth daily. Yes Provider, Historical  
rosuvastatin (CRESTOR) 10 mg tablet Take 1 Tab by mouth nightly. 6/19/19  Yes Delmy Rios P, NP  
metoprolol succinate (TOPROL XL) 50 mg XL tablet Take 25 mg by mouth nightly. Yes Provider, Historical  
predniSONE (DELTASONE) 1 mg tablet Take 4 mg by mouth daily. Indications: rheumatoid arthritis   Yes Provider, Historical  
BABY ASPIRIN PO Take 81 mg by mouth daily. Yes Provider, Historical  
QUEtiapine (SEROQUEL) 25 mg tablet Take 25 mg by mouth nightly. Yes Provider, Historical  
meloxicam (MOBIC) 7.5 mg tablet Take 2 Tabs by mouth daily. 2/10/17  Yes Luiza Dixon MD  
triamterene-hydroCHLOROthiazide [facility]) 75-50 mg per tablet Take 0.5 Tabs by mouth daily. Yes Provider, Historical  
MULTIVITAMINS (MULTIVITAMIN PO) Take  by mouth daily. Yes Provider, Historical  
 
 
Review of Systems:   
14 systems were reviewed. The results are as above in the HPI and otherwise negative. Objective:  
 
Vitals:  
 11/08/19 1307 BP: 139/73 Pulse: 71 Resp: 17 Temp: 97 °F (36.1 °C) TempSrc: Oral  
SpO2: 96% Weight: 73.9 kg (163 lb) Height: 5' 3\" (1.6 m) Physical Exam: 
GENERAL: alert, cooperative, no distress, appears stated age, EYE: conjunctivae/corneas clear. PERRL, EOM's intact. THROAT & NECK: normal and no erythema or exudates noted. ,   
LYMPHATIC: Cervical, supraclavicular, and axillary nodes normal. ,  
LUNG: clear to auscultation bilaterally, HEART: regular rate and rhythm, S1, S2 normal, no murmur, click, rub or gallop, ABDOMEN: soft, non-distended, tender nonreducible epigastric hernia. . Bowel sounds normal. No masses,  no organomegaly, EXTREMITIES:  extremities normal, atraumatic, no cyanosis or edema, SKIN: Normal., NEUROLOGIC: AOx3. Cranial nerves 2-12 and sensation grossly intact. ,  
 
Data Review:  to be done Ms. Arevalo has a reminder for a \"due or due soon\" health maintenance. I have asked that she contact her primary care provider for follow-up on this health maintenance. Impression: · Patient with symptomatic nonreducible epigastric hernia. Plan:  
 
· Robot-assisted laparoscopic epigastric hernia repair with mesh (pelvic docking) · Consent on chart · Preoperative orders written Signed By: Remonia Goldberg, MD   
 November 17, 2019

## 2019-11-17 NOTE — PROGRESS NOTES
[facility]  General Surgery    Subjective:      HPI: Patient is a very pleasant 79-year-old female with a past medical history remarkable for hypertension, hypercholesterolemia, breast cancer status post left mastectomy with sentinel lymph node biopsy in 2011, coronary artery disease status post coronary artery bypass grafting in 2018 and paroxysmal atrial fibrillation. Her recent CAT scan abdomen pelvis performed November 6, 2019 confirms an epigastric hernia. The patient has been having pain at the epigastric hernia site for several months. Patient Active Problem List    Diagnosis Date Noted    PAF (paroxysmal atrial fibrillation) ([facility] [address] 08/15/2019    S/P CABG x 3 04/12/2018    Incisional hernia without mention of obstruction or gangrene 01/19/2017    Coronary artery disease involving native coronary artery of native heart without angina pectoris 12/16/2016    Precordial pain 12/16/2016    Coronary artery disease involving native coronary artery of native heart with angina pectoris ([address] 10/30/2016    Personal history of malignant neoplasm of breast 03/29/2016    Pre-operative cardiovascular examination 03/26/2014    Abnormal finding on EKG 03/26/2014    S/P partial mastectomy, left, with sentinel lymph node biopsy, 5/11     FHx: breast cancer     Breast cancer ([address] 12/06/2011    Insomnia 09/13/2011    Ataxic gait 10/21/2010    Vitamin D deficiency 09/14/2010    HTN (hypertension) 03/27/2010    Hypercholesteremia 03/27/2010    Anxiety 03/27/2010     Past Medical History:   Diagnosis Date    Abnormal finding on EKG 3/26/2014    s/o inf wall mi asymptomatic     Anxiety     Ataxic gait 10/21/2010    Breast cancer ([facility] [address] 05/2014    left side    CAD (coronary artery disease) 10/2015    LM normal, LAD mid 70%, OM1 diffuse 80%, RCA dominant with distal 99% on cardiac catheterization    Cardiac nuclear imaging test 11/29/2016    Low risk.   Likely inferior basal prior injury. No ischemia. EF 65%. Mild inferobasal hypk.   Neg EKG on pharm stress test.    Diverticulosis     FHx: breast cancer     GERD (gastroesophageal reflux disease)     Herpes     Hypercholesterolemia     Hypertension     Insomnia     Osteopenia     Osteoporosis screening 2008    WNL per patient, GYN orders    PAF (paroxysmal atrial fibrillation) ([facility] [address] 06/07/2019    Diagnosed on 30-day event monitor    Pap smear 8/2010    GYN, normal    Pre-operative cardiovascular examination 3/26/2014    for shoulder surgery     Rheumatoid arthritis ([facility] [address]     S/P CABG x 3 10/2015    LIMA to LAD, SVG to OM, SVG to distal RCA    S/P partial mastectomy, left, with sentinel lymph node biopsy, 5/11     Screening mammogram 6/2010    GYN orders      Past Surgical History:   Procedure Laterality Date    CARDIAC SURG PROCEDURE UNLIST  10/2015    triple bypass    COLONOSCOPY  09/2008    HX [facility]    HX BREAST BIOPSY      right (2000), left (2011)    HX HYSTERECTOMY  10/2009    complete, fibroid    HX MASTECTOMY  6./8/11    partial left with sentinel lymph node biopsy    HX MASTECTOMY Left 9/10/2014    LEFT PARTIAL MASTECTOMY performed by Shaniqua Alvarez MD at [place] [id number]      left lower leg surgery at age 11    HX ORTHOPAEDIC Right 7/2014    ROTATOR CUFF    [place] LAP, INCISIONAL HERNIA REPAIR,REDUCIBLE N/A 01/19/2017    Dr. Cyrus Gomez      Family History   Problem Relation Age of Onset    Cancer Mother 48        Breast    Breast Cancer Mother 48    Heart Disease Father     Cancer Sister 76        Breast    Breast Cancer Sister 76    Heart Disease Brother     Diabetes Brother     Diabetes Sister       Social History     Tobacco Use    Smoking status: Never Smoker    Smokeless tobacco: Never Used    Tobacco comment: quit age 26   Substance Use Topics    Alcohol use: No     Frequency: Never      Allergies   Allergen Reactions    Latex Unable to Obtain     To tape with latex  Norco [Hydrocodone-Acetaminophen] Swelling     Patient experienced throat swelling, hoarseness and difficulty swallowing.  Adhesive Hives    Codeine Nausea Only     Pt states she is not allergic. Prior to Admission medications    Medication Sig Start Date End Date Taking? Authorizing Provider   cholecalciferol (VITAMIN D3) 1,000 unit cap Take  by mouth daily. Yes Provider, Historical   rosuvastatin (CRESTOR) 10 mg tablet Take 1 Tab by mouth nightly. 6/19/19  Yes Marlene Trejo P, NP   metoprolol succinate (TOPROL XL) 50 mg XL tablet Take 25 mg by mouth nightly. Yes Provider, Historical   predniSONE (DELTASONE) 1 mg tablet Take 4 mg by mouth daily. Indications: rheumatoid arthritis   Yes Provider, Historical   BABY ASPIRIN PO Take 81 mg by mouth daily. Yes Provider, Historical   QUEtiapine (SEROQUEL) 25 mg tablet Take 25 mg by mouth nightly. Yes Provider, Historical   meloxicam (MOBIC) 7.5 mg tablet Take 2 Tabs by mouth daily. 2/10/17  Yes Raymundo Morley MD   triamterene-hydroCHLOROthiazide [facility]) 75-50 mg per tablet Take 0.5 Tabs by mouth daily. Yes Provider, Historical   MULTIVITAMINS (MULTIVITAMIN PO) Take  by mouth daily. Yes Provider, Historical       Review of Systems:    14 systems were reviewed. The results are as above in the HPI and otherwise negative. Objective:     Vitals:    11/08/19 1307   BP: 139/73   Pulse: 71   Resp: 17   Temp: 97 °F (36.1 °C)   TempSrc: Oral   SpO2: 96%   Weight: 73.9 kg (163 lb)   Height: 5' 3\" (1.6 m)       Physical Exam:  GENERAL: alert, cooperative, no distress, appears stated age,   EYE: conjunctivae/corneas clear. PERRL, EOM's intact.   THROAT & NECK: normal and no erythema or exudates noted. ,    LYMPHATIC: Cervical, supraclavicular, and axillary nodes normal. ,   LUNG: clear to auscultation bilaterally,   HEART: regular rate and rhythm, S1, S2 normal, no murmur, click, rub or gallop,   ABDOMEN: soft, non-distended, tender nonreducible epigastric hernia. . Bowel sounds normal. No masses,  no organomegaly,   EXTREMITIES:  extremities normal, atraumatic, no cyanosis or edema,   SKIN: Normal.,   NEUROLOGIC: AOx3. Cranial nerves 2-12 and sensation grossly intact. ,     Data Review:  to be done    Ms. Arevalo has a reminder for a \"due or due soon\" health maintenance. I have asked that she contact her primary care provider for follow-up on this health maintenance. Impression:     · Patient with symptomatic nonreducible epigastric hernia.       Plan:     · Robot-assisted laparoscopic epigastric hernia repair with mesh (pelvic docking)  · Consent on chart  · Preoperative orders written    Signed By: Remonia Goldberg, MD     November 17, 2019

## 2019-11-17 NOTE — PATIENT INSTRUCTIONS
Abdominal Hernia Repair: Before Your Surgery  What is abdominal hernia repair surgery? An abdominal hernia repair is a type of surgery. It fixes a problem called a hernia. A hernia is a bulge under the skin in your belly. It happens when you have a weak spot in your belly muscles and a piece of your intestines or tissues pokes through your muscles. This can cause pain. You may notice the pain most when you lift something heavy. You can have a hernia near your belly button. Or it may be in a scar from an earlier surgery. To fix it, the doctor will do one of two kinds of surgery. In open surgery, the doctor makes one cut near the hernia. This cut is called an incision. In laparoscopic surgery, the doctor makes several very small incisions and uses a thin, lighted scope and small tools. In either type of surgery, the doctor pushes the bulge back in place, if needed. Then the doctor sews the healthy tissue back together. Often the doctor patches the weak spot with a piece of material.  Laparoscopic surgery leaves several small scars. Open surgery leaves one long scar. The scars fade with time. You will probably need to take 1 to 2 weeks off from work. But if your job requires heavy lifting or other physical work, you may need to take 4 to 6 weeks off. Follow-up care is a key part of your treatment and safety. Be sure to make and go to all appointments, and call your doctor if you are having problems. It's also a good idea to know your test results and keep a list of the medicines you take. What happens before surgery?   Surgery can be stressful. This information will help you understand what you can expect. And it will help you safely prepare for surgery.   Preparing for surgery    · Understand exactly what surgery is planned, along with the risks, benefits, and other options. · Tell your doctors ALL the medicines, vitamins, supplements, and herbal remedies you take.  Some of these can increase the risk of bleeding or interact with anesthesia.     · If you take blood thinners, such as warfarin (Coumadin), clopidogrel (Plavix), or aspirin, be sure to talk to your doctor. He or she will tell you if you should stop taking these medicines before your surgery. Make sure that you understand exactly what your doctor wants you to do.     · Your doctor will tell you which medicines to take or stop before your surgery. You may need to stop taking certain medicines a week or more before surgery. So talk to your doctor as soon as you can.     · If you have an advance directive, let your doctor know. It may include a living will and a durable power of  for health care. Bring a copy to the hospital. If you don't have one, you may want to prepare one. It lets your doctor and loved ones know your health care wishes. Doctors advise that everyone prepare these papers before any type of surgery or procedure. What happens on the day of surgery? · Follow the instructions exactly about when to stop eating and drinking. If you don't, your surgery may be canceled. If your doctor told you to take your medicines on the day of surgery, take them with only a sip of water.     · Take a bath or shower before you come in for your surgery. Do not apply lotions, perfumes, deodorants, or nail polish.     · Do not shave the surgical site yourself.     · Take off all jewelry and piercings. And take out contact lenses, if you wear them.    At the hospital or surgery center   · Bring a picture ID.     · The area for surgery is often marked to make sure there are no errors.     · You will be kept comfortable and safe by your anesthesia provider. You will be asleep during the surgery.     · The surgery will take 30 minutes to 2 hours. It depends on how large the hernia is and where it is. Going home   · Be sure you have someone to drive you home.  Anesthesia and pain medicine make it unsafe for you to drive.     · You will be given more specific instructions about recovering from your surgery. They will cover things like diet, wound care, follow-up care, driving, and getting back to your normal routine. When should you call your doctor? · You have questions or concerns.     · You don't understand how to prepare for your surgery.     · You become ill before the surgery (such as fever, flu, or a cold).     · You need to reschedule or have changed your mind about having the surgery. Where can you learn more? Go to http://obinna-luda.info/. Enter U288 in the search box to learn more about \"Abdominal Hernia Repair: Before Your Surgery. \"  Current as of: November 7, 2018  Content Version: 12.2  © 7023-1263 [employer]. Care instructions adapted under license by [employer] (which disclaims liability or warranty for this information). If you have questions about a medical condition or this instruction, always ask your healthcare professional. [address] any warranty or liability for your use of this information.

## 2019-11-18 ENCOUNTER — ANESTHESIA (OUTPATIENT)
Dept: SURGERY | Age: 79
End: 2019-11-18
Payer: MEDICARE

## 2019-11-18 ENCOUNTER — HOSPITAL ENCOUNTER (OUTPATIENT)
Age: 79
Setting detail: OUTPATIENT SURGERY
Discharge: HOME | End: 2019-11-18
Attending: SURGERY | Admitting: SURGERY
Payer: MEDICARE

## 2019-11-18 VITALS
SYSTOLIC BLOOD PRESSURE: 116 MMHG | DIASTOLIC BLOOD PRESSURE: 71 MMHG | HEIGHT: 63 IN | HEART RATE: 68 BPM | BODY MASS INDEX: 28.77 KG/M2 | RESPIRATION RATE: 14 BRPM | WEIGHT: 162.38 LBS | TEMPERATURE: 98.6 F | OXYGEN SATURATION: 93 %

## 2019-11-18 DIAGNOSIS — G89.18 POSTOPERATIVE PAIN: Primary | ICD-10-CM

## 2019-11-18 PROCEDURE — 74011000250 HC RX REV CODE- 250: Performed by: NURSE ANESTHETIST, CERTIFIED REGISTERED

## 2019-11-18 PROCEDURE — 74011250636 HC RX REV CODE- 250/636: Performed by: SURGERY

## 2019-11-18 PROCEDURE — 77030018836 HC SOL IRR NACL ICUM -A: Performed by: SURGERY

## 2019-11-18 PROCEDURE — 77030022704 HC SUT VLOC COVD -B: Performed by: SURGERY

## 2019-11-18 PROCEDURE — 77030009848 HC PASSR SUT SET COOP -C: Performed by: SURGERY

## 2019-11-18 PROCEDURE — 74011000250 HC RX REV CODE- 250: Performed by: SURGERY

## 2019-11-18 PROCEDURE — 74011250636 HC RX REV CODE- 250/636: Performed by: NURSE ANESTHETIST, CERTIFIED REGISTERED

## 2019-11-18 PROCEDURE — 74011000250 HC RX REV CODE- 250

## 2019-11-18 PROCEDURE — 74011000258 HC RX REV CODE- 258: Performed by: NURSE ANESTHETIST, CERTIFIED REGISTERED

## 2019-11-18 PROCEDURE — 77030008683 HC TU ET CUF COVD -A: Performed by: ANESTHESIOLOGY

## 2019-11-18 PROCEDURE — 77030020703 HC SEAL CANN DISP INTU -B: Performed by: SURGERY

## 2019-11-18 PROCEDURE — 77030031139 HC SUT VCRL2 J&J -A: Performed by: SURGERY

## 2019-11-18 PROCEDURE — 76010000875 HC OR TIME 1.5 TO 2HR INTENSV - TIER 2: Performed by: SURGERY

## 2019-11-18 PROCEDURE — 74011250637 HC RX REV CODE- 250/637: Performed by: NURSE ANESTHETIST, CERTIFIED REGISTERED

## 2019-11-18 PROCEDURE — 77030019605: Performed by: SURGERY

## 2019-11-18 PROCEDURE — 77030003028 HC SUT VCRL J&J -A: Performed by: SURGERY

## 2019-11-18 PROCEDURE — 77030040922 HC BLNKT HYPOTHRM STRY -A: Performed by: SURGERY

## 2019-11-18 PROCEDURE — 77030035277 HC OBTRTR BLDELSS DISP INTU -B: Performed by: SURGERY

## 2019-11-18 PROCEDURE — 76210000026 HC REC RM PH II 1 TO 1.5 HR: Performed by: SURGERY

## 2019-11-18 PROCEDURE — 76060000034 HC ANESTHESIA 1.5 TO 2 HR: Performed by: SURGERY

## 2019-11-18 PROCEDURE — 77030040361 HC SLV COMPR DVT MDII -B: Performed by: SURGERY

## 2019-11-18 PROCEDURE — 77030008771 HC TU NG SALEM SUMP -A: Performed by: ANESTHESIOLOGY

## 2019-11-18 PROCEDURE — 77030002933 HC SUT MCRYL J&J -A: Performed by: SURGERY

## 2019-11-18 PROCEDURE — 74011250637 HC RX REV CODE- 250/637: Performed by: SURGERY

## 2019-11-18 PROCEDURE — 74011000258 HC RX REV CODE- 258

## 2019-11-18 PROCEDURE — 77030018673: Performed by: SURGERY

## 2019-11-18 PROCEDURE — 76210000016 HC OR PH I REC 1 TO 1.5 HR: Performed by: SURGERY

## 2019-11-18 RX ORDER — BUPIVACAINE HYDROCHLORIDE 2.5 MG/ML
INJECTION, SOLUTION EPIDURAL; INFILTRATION; INTRACAUDAL AS NEEDED
Status: DISCONTINUED | OUTPATIENT
Start: 2019-11-18 | End: 2019-11-18 | Stop reason: HOSPADM

## 2019-11-18 RX ORDER — DEXTROSE 50 % IN WATER (D50W) INTRAVENOUS SYRINGE
25-50 AS NEEDED
Status: DISCONTINUED | OUTPATIENT
Start: 2019-11-18 | End: 2019-11-18 | Stop reason: HOSPADM

## 2019-11-18 RX ORDER — DOCUSATE SODIUM 100 MG/1
100 CAPSULE, LIQUID FILLED ORAL 2 TIMES DAILY
Qty: 60 CAP | Refills: 2 | Status: SHIPPED | OUTPATIENT
Start: 2019-11-18 | End: 2020-02-16

## 2019-11-18 RX ORDER — FAMOTIDINE 20 MG/1
20 TABLET, FILM COATED ORAL ONCE
Status: COMPLETED | OUTPATIENT
Start: 2019-11-18 | End: 2019-11-18

## 2019-11-18 RX ORDER — ONDANSETRON 2 MG/ML
INJECTION INTRAMUSCULAR; INTRAVENOUS AS NEEDED
Status: DISCONTINUED | OUTPATIENT
Start: 2019-11-18 | End: 2019-11-18 | Stop reason: HOSPADM

## 2019-11-18 RX ORDER — HYDROCORTISONE SODIUM SUCCINATE 100 MG/2ML
INJECTION, POWDER, FOR SOLUTION INTRAMUSCULAR; INTRAVENOUS AS NEEDED
Status: DISCONTINUED | OUTPATIENT
Start: 2019-11-18 | End: 2019-11-18 | Stop reason: HOSPADM

## 2019-11-18 RX ORDER — GLYCOPYRROLATE 0.2 MG/ML
INJECTION INTRAMUSCULAR; INTRAVENOUS AS NEEDED
Status: DISCONTINUED | OUTPATIENT
Start: 2019-11-18 | End: 2019-11-18 | Stop reason: HOSPADM

## 2019-11-18 RX ORDER — SODIUM CHLORIDE, SODIUM LACTATE, POTASSIUM CHLORIDE, CALCIUM CHLORIDE 600; 310; 30; 20 MG/100ML; MG/100ML; MG/100ML; MG/100ML
25 INJECTION, SOLUTION INTRAVENOUS CONTINUOUS
Status: DISCONTINUED | OUTPATIENT
Start: 2019-11-18 | End: 2019-11-18 | Stop reason: HOSPADM

## 2019-11-18 RX ORDER — ONDANSETRON 2 MG/ML
4 INJECTION INTRAMUSCULAR; INTRAVENOUS ONCE
Status: DISCONTINUED | OUTPATIENT
Start: 2019-11-18 | End: 2019-11-18 | Stop reason: HOSPADM

## 2019-11-18 RX ORDER — DEXAMETHASONE SODIUM PHOSPHATE 4 MG/ML
INJECTION, SOLUTION INTRA-ARTICULAR; INTRALESIONAL; INTRAMUSCULAR; INTRAVENOUS; SOFT TISSUE AS NEEDED
Status: DISCONTINUED | OUTPATIENT
Start: 2019-11-18 | End: 2019-11-18 | Stop reason: HOSPADM

## 2019-11-18 RX ORDER — OXYCODONE AND ACETAMINOPHEN 5; 325 MG/1; MG/1
1 TABLET ORAL
Qty: 25 TAB | Refills: 0 | Status: SHIPPED | OUTPATIENT
Start: 2019-11-18 | End: 2019-11-25

## 2019-11-18 RX ORDER — NEOSTIGMINE METHYLSULFATE 1 MG/ML
INJECTION, SOLUTION INTRAVENOUS AS NEEDED
Status: DISCONTINUED | OUTPATIENT
Start: 2019-11-18 | End: 2019-11-18 | Stop reason: HOSPADM

## 2019-11-18 RX ORDER — MAGNESIUM SULFATE 100 %
4 CRYSTALS MISCELLANEOUS AS NEEDED
Status: DISCONTINUED | OUTPATIENT
Start: 2019-11-18 | End: 2019-11-18 | Stop reason: HOSPADM

## 2019-11-18 RX ORDER — OXYCODONE AND ACETAMINOPHEN 5; 325 MG/1; MG/1
1 TABLET ORAL
Status: COMPLETED | OUTPATIENT
Start: 2019-11-18 | End: 2019-11-18

## 2019-11-18 RX ORDER — PROPOFOL 10 MG/ML
INJECTION, EMULSION INTRAVENOUS AS NEEDED
Status: DISCONTINUED | OUTPATIENT
Start: 2019-11-18 | End: 2019-11-18 | Stop reason: HOSPADM

## 2019-11-18 RX ORDER — INSULIN LISPRO 100 [IU]/ML
INJECTION, SOLUTION INTRAVENOUS; SUBCUTANEOUS ONCE
Status: DISCONTINUED | OUTPATIENT
Start: 2019-11-18 | End: 2019-11-18 | Stop reason: HOSPADM

## 2019-11-18 RX ORDER — SUCCINYLCHOLINE CHLORIDE 20 MG/ML
INJECTION INTRAMUSCULAR; INTRAVENOUS AS NEEDED
Status: DISCONTINUED | OUTPATIENT
Start: 2019-11-18 | End: 2019-11-18 | Stop reason: HOSPADM

## 2019-11-18 RX ORDER — FENTANYL CITRATE 50 UG/ML
25 INJECTION, SOLUTION INTRAMUSCULAR; INTRAVENOUS
Status: DISCONTINUED | OUTPATIENT
Start: 2019-11-18 | End: 2019-11-18 | Stop reason: HOSPADM

## 2019-11-18 RX ORDER — FENTANYL CITRATE 50 UG/ML
INJECTION, SOLUTION INTRAMUSCULAR; INTRAVENOUS AS NEEDED
Status: DISCONTINUED | OUTPATIENT
Start: 2019-11-18 | End: 2019-11-18 | Stop reason: HOSPADM

## 2019-11-18 RX ORDER — CEFAZOLIN SODIUM 2 G/50ML
2 SOLUTION INTRAVENOUS
Status: COMPLETED | OUTPATIENT
Start: 2019-11-18 | End: 2019-11-18

## 2019-11-18 RX ORDER — ROCURONIUM BROMIDE 10 MG/ML
INJECTION, SOLUTION INTRAVENOUS AS NEEDED
Status: DISCONTINUED | OUTPATIENT
Start: 2019-11-18 | End: 2019-11-18 | Stop reason: HOSPADM

## 2019-11-18 RX ORDER — SODIUM CHLORIDE 0.9 % (FLUSH) 0.9 %
5-40 SYRINGE (ML) INJECTION EVERY 8 HOURS
Status: DISCONTINUED | OUTPATIENT
Start: 2019-11-18 | End: 2019-11-18 | Stop reason: HOSPADM

## 2019-11-18 RX ORDER — SODIUM CHLORIDE 0.9 % (FLUSH) 0.9 %
5-40 SYRINGE (ML) INJECTION AS NEEDED
Status: DISCONTINUED | OUTPATIENT
Start: 2019-11-18 | End: 2019-11-18 | Stop reason: HOSPADM

## 2019-11-18 RX ORDER — BISACODYL 5 MG
5 TABLET, DELAYED RELEASE (ENTERIC COATED) ORAL DAILY
Qty: 7 TAB | Refills: 0 | Status: SHIPPED | OUTPATIENT
Start: 2019-11-18 | End: 2021-07-06

## 2019-11-18 RX ORDER — LIDOCAINE HYDROCHLORIDE 20 MG/ML
INJECTION, SOLUTION EPIDURAL; INFILTRATION; INTRACAUDAL; PERINEURAL AS NEEDED
Status: DISCONTINUED | OUTPATIENT
Start: 2019-11-18 | End: 2019-11-18 | Stop reason: HOSPADM

## 2019-11-18 RX ADMIN — CEFAZOLIN 2 G: 10 INJECTION, POWDER, FOR SOLUTION INTRAVENOUS at 11:11

## 2019-11-18 RX ADMIN — GLYCOPYRROLATE 0.6 MG: 0.2 INJECTION INTRAMUSCULAR; INTRAVENOUS at 12:05

## 2019-11-18 RX ADMIN — PROPOFOL 150 MG: 10 INJECTION, EMULSION INTRAVENOUS at 10:59

## 2019-11-18 RX ADMIN — SODIUM CHLORIDE 4 MCG: 0.9 INJECTION INTRAVENOUS at 11:20

## 2019-11-18 RX ADMIN — PHENYLEPHRINE HYDROCHLORIDE 50 MCG: 10 INJECTION INTRAVENOUS at 11:04

## 2019-11-18 RX ADMIN — LIDOCAINE HYDROCHLORIDE 80 MG: 20 INJECTION, SOLUTION EPIDURAL; INFILTRATION; INTRACAUDAL; PERINEURAL at 10:59

## 2019-11-18 RX ADMIN — ROCURONIUM BROMIDE 5 MG: 10 INJECTION, SOLUTION INTRAVENOUS at 11:50

## 2019-11-18 RX ADMIN — SODIUM CHLORIDE 6 MCG: 0.9 INJECTION INTRAVENOUS at 12:12

## 2019-11-18 RX ADMIN — SUCCINYLCHOLINE CHLORIDE 120 MG: 20 INJECTION, SOLUTION INTRAMUSCULAR; INTRAVENOUS at 10:59

## 2019-11-18 RX ADMIN — OXYCODONE AND ACETAMINOPHEN 1 TABLET: 5; 325 TABLET ORAL at 14:53

## 2019-11-18 RX ADMIN — FENTANYL CITRATE 25 MCG: 50 INJECTION INTRAMUSCULAR; INTRAVENOUS at 11:18

## 2019-11-18 RX ADMIN — ROCURONIUM BROMIDE 5 MG: 10 INJECTION, SOLUTION INTRAVENOUS at 10:59

## 2019-11-18 RX ADMIN — ROCURONIUM BROMIDE 25 MG: 10 INJECTION, SOLUTION INTRAVENOUS at 11:11

## 2019-11-18 RX ADMIN — HYDROCORTISONE SODIUM SUCCINATE 100 MG: 100 INJECTION, POWDER, FOR SOLUTION INTRAMUSCULAR; INTRAVENOUS at 11:04

## 2019-11-18 RX ADMIN — FAMOTIDINE 20 MG: 20 TABLET ORAL at 08:42

## 2019-11-18 RX ADMIN — SODIUM CHLORIDE 4 MCG: 0.9 INJECTION INTRAVENOUS at 11:16

## 2019-11-18 RX ADMIN — ONDANSETRON 4 MG: 2 INJECTION INTRAMUSCULAR; INTRAVENOUS at 12:01

## 2019-11-18 RX ADMIN — SODIUM CHLORIDE, SODIUM LACTATE, POTASSIUM CHLORIDE, AND CALCIUM CHLORIDE 25 ML/HR: 600; 310; 30; 20 INJECTION, SOLUTION INTRAVENOUS at 08:30

## 2019-11-18 RX ADMIN — SODIUM CHLORIDE 4 MCG: 0.9 INJECTION INTRAVENOUS at 11:18

## 2019-11-18 RX ADMIN — PHENYLEPHRINE HYDROCHLORIDE 50 MCG: 10 INJECTION INTRAVENOUS at 11:11

## 2019-11-18 RX ADMIN — DEXAMETHASONE SODIUM PHOSPHATE 4 MG: 4 INJECTION, SOLUTION INTRAMUSCULAR; INTRAVENOUS at 11:08

## 2019-11-18 RX ADMIN — Medication 3 MG: at 12:05

## 2019-11-18 RX ADMIN — FENTANYL CITRATE 50 MCG: 50 INJECTION INTRAMUSCULAR; INTRAVENOUS at 10:59

## 2019-11-18 NOTE — DISCHARGE INSTRUCTIONS
[place] [address] [facility]  Saniya Santiago MD, [facility]  General Surgery    Pt may remove the dressing and shower in two days. Allow soap and water to run over the incision. No driving or operating heavy machinery while on narcotic pain medications. Please apply an ice pack to the operative site for 30 minutes 3 times daily to help reduce pain and swelling and the need for narcotic pain medication. No strenuous activity or contact sports for two weeks. No lifting greater than 15 lbs for 2 weeks. Call MD for any redness, swelling, bleeding or pus at the incision. Also call for any nausea, vomiting, increased pain or pain uncontrolled by pain medicine. Patient Education        High-Fiber Diet: Care Instructions  Your Care Instructions    A high-fiber diet may help you relieve constipation and feel less bloated. Your doctor and dietitian will help you make a high-fiber eating plan based on your personal needs. The plan will include the things you like to eat. It will also make sure that you get 30 grams of fiber a day. Before you make changes to the way you eat, be sure to talk with your doctor or dietitian. Follow-up care is a key part of your treatment and safety. Be sure to make and go to all appointments, and call your doctor if you are having problems. It's also a good idea to know your test results and keep a list of the medicines you take. How can you care for yourself at home? · You can increase how much fiber you get if you eat more of certain foods. These foods include:  ? Whole-grain breads and cereals. ? Fruits, such as pears, apples, and peaches. Eat the skins, peels, and seeds, if you can.  ? Vegetables, such as broccoli, cabbage, spinach, carrots, asparagus, and squash. ? Starchy vegetables. These include potatoes with skins, kidney beans, and lima beans. · Take a fiber supplement every day if your doctor recommends it.  Examples are Benefiber, Citrucel, FiberCon, and Metamucil. Ask your doctor how much to take. · Drink plenty of fluids, enough so that your urine is light yellow or clear like water. If you have kidney, heart, or liver disease and have to limit fluids, talk with your doctor before you increase the amount of fluids you drink. · Get some exercise every day. Exercise helps stool move through the colon. It also helps prevent constipation. · Keep a food diary. Try to notice and write down what foods cause gas, pain, or other symptoms. Then you can avoid these foods. Where can you learn more? Go to http://obinna-luda.info/. Enter X462 in the search box to learn more about \"High-Fiber Diet: Care Instructions. \"  Current as of: November 7, 2018  Content Version: 12.2  © 7736-6219 [employer]. Care instructions adapted under license by [employer] (which disclaims liability or warranty for this information). If you have questions about a medical condition or this instruction, always ask your healthcare professional. [place] [address] any warranty or liability for your use of this information. Patient Education   Oxycodone/Acetaminophen (By mouth)   Acetaminophen (s-hlyq-k-MIN-oh-fen), Oxycodone Hydrochloride (id-a-DWM-done alison-droe-KLOR-letitia)  Treats moderate to moderately severe pain. This medicine is a narcotic pain reliever. Brand Name(s): Endocet, Percocet, Primlev, Xartemis XR   There may be other brand names for this medicine. When This Medicine Should Not Be Used: This medicine is not right for everyone. Do not use it if you had an allergic reaction to acetaminophen or oxycodone, or if you have serious breathing problems or paralytic ileus. How to Use This Medicine:   Capsule, Liquid, Tablet, Long Acting Tablet  · Your doctor will tell you how much medicine to use. Do not use more than directed. · An overdose can be dangerous.  Follow directions carefully so you do not get too much medicine at one time. · Oral liquid: Measure the oral liquid medicine with a marked measuring spoon, oral syringe, or medicine cup. · Swallow the extended-release tablet whole. Do not crush, break, or chew it. Do not lick or wet the tablet before placing it in your mouth. Do not give this medicine through a feeding tube. · This medicine should come with a Medication Guide. Ask your pharmacist for a copy if you do not have one. · Missed dose: If you miss a dose of this medicine, skip the missed dose and go back to your regular dosing schedule. Do not double doses. · Store the medicine in a closed container at room temperature, away from heat, moisture, and direct light. Ask your pharmacist about the best way to dispose of medicine you do not use. Drugs and Foods to Avoid:   Ask your doctor or pharmacist before using any other medicine, including over-the-counter medicines, vitamins, and herbal products. · Do not use Xartemis XR if you are using or have used an MAO inhibitor in the past 14 days. · Some medicines can affect how this medicine works. Tell your doctor if you are using any of the following:   ¨ Carbamazepine, erythromycin, ketoconazole, lamotrigine, mirtazapine, naltrexone, phenytoin, propranolol, rifampin, ritonavir, tramadol, trazodone, or zidovudine  ¨ Birth control pills  ¨ Diuretic (water pill)  ¨ Medicine to treat depression  ¨ Phenothiazine medicine  ¨ Triptan medicine to treat migraine headaches  · Do not drink alcohol while you are using this medicine. Acetaminophen can damage your liver, and alcohol can increase this risk. Do not take acetaminophen without asking your doctor if you have 3 or more drinks of alcohol every day. · Tell your doctor if you use anything else that makes you sleepy. Some examples are allergy medicine, narcotic pain medicine, and alcohol. Tell your doctor if you are using buprenorphine, butorphanol, nalbuphine, pentazocine, a benzodiazepine, or a muscle relaxer.   Warnings While Using This Medicine:   · Tell your doctor if you are pregnant or breastfeeding, or if you have kidney disease, liver disease, heart disease, low blood pressure, breathing problems or lung disease (such as asthma, COPD), thyroid problems, Licking disease, pancreas or gallbladder problems, prostate problems, trouble urinating, or a stomach problems, or a history of head injury or brain damage, seizures, or alcohol or drug abuse. Tell your doctor if you are allergic to codeine. · This medicine may cause the following problems:  ¨ High risk of overdose, which can lead to death  ¨ Respiratory depression (serious breathing problem that can be life-threatening)  ¨ Liver problems  ¨ Serious skin reactions  ¨ Serotonin syndrome (when used with certain medicines)  · This medicine may make you dizzy or drowsy. Do not drive or do anything that could be dangerous until you know how this medicine affects you. Sit or lie down if you feel dizzy. Stand up carefully. · This medicine contains acetaminophen. Read the labels of all other medicines you are using to see if they also contain acetaminophen, or ask your doctor or pharmacist. Tiago Sons not use more than 4 grams (4,000 milligrams) total of acetaminophen in one day. · This medicine can be habit-forming. Do not use more than your prescribed dose. Call your doctor if you think your medicine is not working. · Do not stop using this medicine suddenly. Your doctor will need to slowly decrease your dose before you stop it completely. · This medicine could cause infertility. Talk with your doctor before using this medicine if you plan to have children. · This medicine may cause constipation, especially with long-term use. Ask your doctor if you should use a laxative to prevent and treat constipation. · Keep all medicine out of the reach of children. Never share your medicine with anyone.   Possible Side Effects While Using This Medicine:   Call your doctor right away if you notice any of these side effects:  · Allergic reaction: Itching or hives, swelling in your face or hands, swelling or tingling in your mouth or throat, chest tightness, trouble breathing  · Anxiety, restlessness, fast heartbeat, fever, muscle spasms, twitching, diarrhea, seeing or hearing things that are not there  · Blistering, peeling, red skin rash  · Blue lips, fingernails, or skin  · Dark urine or pale stools, loss of appetite, stomach pain, yellow skin or eyes  · Extreme weakness, shallow breathing, uneven heartbeat, seizures, sweating, or cold or clammy skin  · Severe confusion, lightheadedness, dizziness, or fainting  · Severe constipation, nausea, or vomiting  · Trouble breathing or slow breathing  If you notice these less serious side effects, talk with your doctor:   · Headache  · Mild constipation, nausea, or vomiting  · Mild sleepiness or drowsiness  If you notice other side effects that you think are caused by this medicine, tell your doctor. Call your doctor for medical advice about side effects. You may report side effects to FDA at 9-785-FDA-5318  © 2017 [facility] Information is for End User's use only and may not be sold, redistributed or otherwise used for commercial purposes. The above information is an  only. It is not intended as medical advice for individual conditions or treatments. Talk to your doctor, nurse or pharmacist before following any medical regimen to see if it is safe and effective for you. DISCHARGE SUMMARY from Nurse    PATIENT INSTRUCTIONS:    After general anesthesia or intravenous sedation, for 24 hours or while taking prescription Narcotics:  · Limit your activities  · Do not drive and operate hazardous machinery  · Do not make important personal or business decisions  · Do  not drink alcoholic beverages  · If you have not urinated within 8 hours after discharge, please contact your surgeon on call.     Report the following to your surgeon:  · Excessive pain, swelling, redness or odor of or around the surgical area  · Temperature over 100.5  · Nausea and vomiting lasting longer than 4 hours or if unable to take medications  · Any signs of decreased circulation or nerve impairment to extremity: change in color, persistent  numbness, tingling, coldness or increase pain  · Any questions    What to do at Home:  Recommended activity: Activity as tolerated and no driving for today, or while taking percocet. *  Please give a list of your current medications to your Primary Care Provider. *  Please update this list whenever your medications are discontinued, doses are      changed, or new medications (including over-the-counter products) are added. *  Please carry medication information at all times in case of emergency situations. These are general instructions for a healthy lifestyle:    No smoking/ No tobacco products/ Avoid exposure to second hand smoke  Surgeon General's Warning:  Quitting smoking now greatly reduces serious risk to your health. Obesity, smoking, and sedentary lifestyle greatly increases your risk for illness    A healthy diet, regular physical exercise & weight monitoring are important for maintaining a healthy lifestyle    You may be retaining fluid if you have a history of heart failure or if you experience any of the following symptoms:  Weight gain of 3 pounds or more overnight or 5 pounds in a week, increased swelling in our hands or feet or shortness of breath while lying flat in bed. Please call your doctor as soon as you notice any of these symptoms; do not wait until your next office visit. The discharge information has been reviewed with the patient and spouse. The patient and spouse verbalized understanding.   Discharge medications reviewed with the patient and spouse and appropriate educational materials and side effects teaching were provided.   ___________________________________________________________________________________________________________________________________

## 2019-11-18 NOTE — INTERVAL H&P NOTE
H&P Update:  Gust Bumpers was seen and examined. History and physical has been reviewed. The patient has been examined.  There have been no significant clinical changes since the completion of the originally dated History and Physical.

## 2019-11-18 NOTE — ANESTHESIA POSTPROCEDURE EVALUATION
Procedure(s):  ROBITIC ASSISTED LAPAROSCOPIC LYSIS OF ADHESIONS. general    Anesthesia Post Evaluation      Multimodal analgesia: multimodal analgesia used between 6 hours prior to anesthesia start to PACU discharge  Patient location during evaluation: bedside  Patient participation: complete - patient participated  Level of consciousness: awake  Pain score: 0  Pain management: adequate  Airway patency: patent  Anesthetic complications: no  Cardiovascular status: stable  Respiratory status: acceptable  Hydration status: acceptable  Post anesthesia nausea and vomiting:  controlled      Vitals Value Taken Time   /56 11/18/2019  1:30 PM   Temp 37 °C (98.6 °F) 11/18/2019 12:32 PM   Pulse 68 11/18/2019  1:31 PM   Resp 15 11/18/2019  1:31 PM   SpO2 95 % 11/18/2019  1:31 PM   Vitals shown include unvalidated device data.

## 2019-11-18 NOTE — OP NOTES
[place] [address]  Yony Narayan                                 OPERATIVE REPORT    PATIENT:     Eula Robertson  MRN:           [id number]    DATE:   2019  BILLIN  ROOM:        PACU/  ATTENDING:   Dewayne De Leon MD  DICTATING:   Dewayne De Leon MD        REOPERATIVE DIAGNOSIS: Epigastric hernia and epigastric abdominal pain    POSTOPERATIVE DIAGNOSIS: Adhesions    PROCEDURES PERFORMED: Robot-assisted laparoscopic lysis of adhesions  SURGEON: Richmond Fairbanks MD.    Demetria Matt SA    ANESTHESIA: General and local (0.25% Marcaine). FINDINGS: Adhesions    SPECIMENS REMOVED: Hernia sac    ESTIMATED BLOOD LOSS: 10 mL. FLUIDS: 750 mL. Implants: None    OPERATIVE INDICATION: The patient with a several month history of epigastric abdominal pain    DESCRIPTION OF OPERATION:  The patient was identified in the holding area, where consent for robot assisted laparoscopic epigastric hernia repair with mesh was verified. The patient was taken to the operating room  where she was placed under general anesthesia in the supine position. The  left arm was tucked to the patient's side. The abdomen was prepped and  draped in a sterile fashion using chlorhexidine solution and sterile  drapes. Stephan Fell was used to add an additional protective barrier between the  skin and the foreign bodies. The time-out was performed to ensure correct  procedure. The local anesthetic was infiltrated into the skin and deep  dermal tissues at each trocar site prior to incision. The initial trocar was placed in the right lower quadrant at the anterior superior iliac crest. This was to accommodate a 12 mm  blunt-tip trocar assembled to the 5 mm 0-degree scope to create the  Visi-Port system. Safe entry into the peritoneal cavity was visualized.  The  pneumoperitoneum was obtained using carbon dioxide gas to 15 mmHg. The  abdomen was briefly explored laparoscopically. The second 8 mm trocar was placed on the left side at the anterior superior iliac crest.  A third trocar -8 mm - was placed in the suprapubic area. The camera was then moved to the suprapubic trocar. The robot was then docked. The patient's position prior to docking was supine. The scissor was used at arm #1. The prograsp was used at arm #2 with the camera inserted in the midaxillary trocar. The attention was turned first to the adhesions of omentum to the anterior abdominal wall. Sharp dissection and electrocautery were used to dissect the omentum and bowel away from the abdominal wall. The mesh which was placed previously was in good position. There is no evidence of hernia recurrence. In the upper abdomen the abdomen the liver was adherent to the mesh. This was also taken down. No evidence of hernia defect was noted from the xiphoid process down to the umbilicus. No injuries to the bowel were noted. No bleeding was present. The 12 mm trocar site was closed using the [employer] and 0 Vicryl suture. Pneumoperitoneum was was allowed to deflate. All trochars were removed under direct visualization. 4-0 Monocryl suture was used to reapproximate the skin at each incision. Mastisol, Steri-Strips and Band-Aids were used to dress the incisions. The patient tolerated procedure very well. Disposition: She was extubated and stable upon transport to recovery room. Richmond Betancourt MD FACS

## 2019-11-18 NOTE — DISCHARGE SUMMARY
[facility]  Adam Reynolds MD, FACS  General Surgery  Discharge Summary     Patient ID:  Edgardo Perez  [id number]  1 y.o.  1940    Admit Date: 11/18/2019    Discharge Date: 11/18/2019    Admission Diagnoses: K43.9 EPIGASTRIC HERNIA    Discharge Diagnoses:    Problem List as of 11/18/2019 Date Reviewed: 11/8/2019          Codes Class Noted - Resolved    PAF (paroxysmal atrial fibrillation) (HCC) ICD-10-CM: I48.0  ICD-9-CM: 427.31  8/15/2019 - Present        S/P CABG x 3 ICD-10-CM: Z95.1  ICD-9-CM: V45.81  4/12/2018 - Present        Incisional hernia without mention of obstruction or gangrene ICD-10-CM: K43.2  ICD-9-CM: 553.21  1/19/2017 - Present        Coronary artery disease involving native coronary artery of native heart without angina pectoris ICD-10-CM: I25.10  ICD-9-CM: 414.01  12/16/2016 - Present        Precordial pain ICD-10-CM: R07.2  ICD-9-CM: 786.51  12/16/2016 - Present        Coronary artery disease involving native coronary artery of native heart with angina pectoris ([facility] [address] ICD-10-CM: I25.119  ICD-9-CM: 414.01, 413.9  10/30/2016 - Present        Personal history of malignant neoplasm of breast ICD-10-CM: Z85.3  ICD-9-CM: V10.3  3/29/2016 - Present        Pre-operative cardiovascular examination ICD-10-CM: Z01.810  ICD-9-CM: V72.81  3/26/2014 - Present    Overview Signed 3/26/2014 12:08 PM by Ricardo Suarez MD     for shoulder surgery             Abnormal finding on EKG ICD-10-CM: R94.31  ICD-9-CM: 794.31  3/26/2014 - Present    Overview Signed 3/26/2014 12:08 PM by Ricardo Suarez MD     s/o inf wall mi  asymptomatic             S/P partial mastectomy, left, with sentinel lymph node biopsy, 5/11 ICD-10-CM: Z90.10  ICD-9-CM: V45.71  Unknown - Present        FHx: breast cancer ICD-10-CM: Z80.3  ICD-9-CM: V16.3  Unknown - Present        Breast cancer ([facility] ICD-10-CM: C50.919  ICD-9-CM: 174.9  12/6/2011 - Present        Insomnia ICD-10-CM: G47.00  ICD-9-CM: 780.52 9/13/2011 - Present        Ataxic gait ICD-10-CM: R26.0  ICD-9-CM: 781.2  10/21/2010 - Present    Overview Signed 10/21/2010  9:16 AM by Marnie Workman     10+ years with balance issues  10/11/10: Dr. Justin Montano testing MRI brain and EMG RLE             Vitamin D deficiency ICD-10-CM: E55.9  ICD-9-CM: 268.9  9/14/2010 - Present        HTN (hypertension) ICD-10-CM: I10  ICD-9-CM: 401.9  3/27/2010 - Present        Hypercholesteremia ICD-10-CM: E78.00  ICD-9-CM: 272.0  3/27/2010 - Present        Anxiety ICD-10-CM: F41.9  ICD-9-CM: 300.00  3/27/2010 - Present               Admission Condition: Good    Discharge Condition: Good    Last Procedure: Procedure(s):  ROBITIC ASSISTED LAPAROSCOPIC LYSIS OF ADHESIONS    Hospital Course:   Normal hospital course for this procedure. Consults: None    Significant Diagnostic Studies: None    Disposition: home    Patient Instructions:   Current Discharge Medication List      START taking these medications    Details   oxyCODONE-acetaminophen (PERCOCET) 5-325 mg per tablet Take 1 Tab by mouth every six (6) hours as needed for Pain for up to 7 days. Max Daily Amount: 4 Tabs. Indications: pain  Qty: 25 Tab, Refills: 0    Associated Diagnoses: Postoperative pain      docusate sodium (COLACE) 100 mg capsule Take 1 Cap by mouth two (2) times a day for 90 days. Qty: 60 Cap, Refills: 2      bisacodyl (DULCOLAX) 5 mg EC tablet Take 1 Tab by mouth daily. Qty: 7 Tab, Refills: 0         CONTINUE these medications which have NOT CHANGED    Details   cholecalciferol (VITAMIN D3) 1,000 unit cap Take  by mouth daily. rosuvastatin (CRESTOR) 10 mg tablet Take 1 Tab by mouth nightly. Qty: 30 Tab, Refills: 6      metoprolol succinate (TOPROL XL) 50 mg XL tablet Take 25 mg by mouth nightly. predniSONE (DELTASONE) 1 mg tablet Take 4 mg by mouth daily. Indications: rheumatoid arthritis      QUEtiapine (SEROQUEL) 25 mg tablet Take 25 mg by mouth nightly.       MULTIVITAMINS (MULTIVITAMIN PO) Take  by mouth daily. BABY ASPIRIN PO Take 81 mg by mouth daily. meloxicam (MOBIC) 7.5 mg tablet Take 2 Tabs by mouth daily. Qty: 30 Tab, Refills: 0      triamterene-hydroCHLOROthiazide (MAXZIDE) 75-50 mg per tablet Take 0.5 Tabs by mouth daily. Activity: See surgical instructions  Diet: Low fat, Low cholesterol  Wound Care: As directed    Follow-up with Dr. Earl Monday in 2 weeks.   Follow-up tests/labs None    Signed:  Neo Hall MD  11/18/2019  12:39 PM

## 2019-11-18 NOTE — ANESTHESIA PREPROCEDURE EVALUATION
Relevant Problems   No relevant active problems       Anesthetic History   No history of anesthetic complications            Review of Systems / Medical History  Patient summary reviewed and pertinent labs reviewed    Pulmonary  Within defined limits                 Neuro/Psych   Within defined limits           Cardiovascular    Hypertension: well controlled        Dysrhythmias (PAF)   CAD and CABG    Exercise tolerance: >4 METS     GI/Hepatic/Renal     GERD: well controlled           Endo/Other        Arthritis (Rheumatoid) and cancer (H/O Left breast cancer)     Other Findings            Physical Exam    Airway  Mallampati: III  TM Distance: 4 - 6 cm  Neck ROM: normal range of motion   Mouth opening: Diminished (comment)     Cardiovascular  Regular rate and rhythm,  S1 and S2 normal,  no murmur, click, rub, or gallop             Dental  No notable dental hx       Pulmonary  Breath sounds clear to auscultation               Abdominal  GI exam deferred       Other Findings            Anesthetic Plan    ASA: 3  Anesthesia type: MAC          Induction: Intravenous  Anesthetic plan and risks discussed with: Patient

## 2019-12-04 ENCOUNTER — OFFICE VISIT (OUTPATIENT)
Dept: SURGERY | Age: 79
End: 2019-12-04

## 2019-12-04 VITALS
TEMPERATURE: 97.1 F | WEIGHT: 164 LBS | HEIGHT: 63 IN | HEART RATE: 72 BPM | BODY MASS INDEX: 29.06 KG/M2 | RESPIRATION RATE: 16 BRPM | DIASTOLIC BLOOD PRESSURE: 80 MMHG | OXYGEN SATURATION: 95 % | SYSTOLIC BLOOD PRESSURE: 151 MMHG

## 2019-12-04 DIAGNOSIS — Z09 POSTOPERATIVE EXAMINATION: Primary | ICD-10-CM

## 2019-12-04 NOTE — PROGRESS NOTES
[facility] [employer] [facility]  General Surgery    Name: Shena Wakefield MRN: [id number]   : 1940 [facility]   Date: 2019 Admission Date: No admission date for patient encounter. Subjective:  Patient presents today without complaints. She states that the abdominal pain which she was experiencing prior to the procedure has resolved. She is concerned about having a flatter stomach. We discussed that her stomach will get flatter she continues to lose weight by making better meal decisions and increasing her level of activity. Objective:  Vitals:    19 1005   BP: 151/80   Pulse: 72   Resp: 16   Temp: 97.1 °F (36.2 °C)   TempSrc: Oral   SpO2: 95%   Weight: 74.4 kg (164 lb)   Height: 5' 3\" (1.6 m)       Physical Exam:    General: Awake and alert, oriented x4, no apparent distress   Abdomen: abdomen is soft with minimal incisional tenderness. Incision(s) are C/D/I. No   masses, organomegaly or guarding    Current Medications:  Current Outpatient Medications   Medication Sig Dispense Refill    docusate sodium (COLACE) 100 mg capsule Take 1 Cap by mouth two (2) times a day for 90 days. 60 Cap 2    bisacodyl (DULCOLAX) 5 mg EC tablet Take 1 Tab by mouth daily. 7 Tab 0    cholecalciferol (VITAMIN D3) 1,000 unit cap Take  by mouth daily.  rosuvastatin (CRESTOR) 10 mg tablet Take 1 Tab by mouth nightly. 30 Tab 6    metoprolol succinate (TOPROL XL) 50 mg XL tablet Take 25 mg by mouth nightly.  predniSONE (DELTASONE) 1 mg tablet Take 4 mg by mouth daily. Indications: rheumatoid arthritis      BABY ASPIRIN PO Take 81 mg by mouth daily.  QUEtiapine (SEROQUEL) 25 mg tablet Take 25 mg by mouth nightly.  meloxicam (MOBIC) 7.5 mg tablet Take 2 Tabs by mouth daily. 30 Tab 0    triamterene-hydroCHLOROthiazide (MAXZIDE) 75-50 mg per tablet Take 0.5 Tabs by mouth daily.  MULTIVITAMINS (MULTIVITAMIN PO) Take  by mouth daily. Chart and notes reviewed. Data reviewed. I have evaluated and examined the patient. IMPRESSION:   · Patient doing well 3 weeks out from robot-assisted laparoscopic lysis of adhesions for abdominal pain and suspected hernia recurrence.       PLAN:/DISCUSION:   · Follow-up CASSANDRA Perez MD

## 2020-01-17 ENCOUNTER — HOSPITAL ENCOUNTER (OUTPATIENT)
Dept: MAMMOGRAPHY | Age: 80
Discharge: HOME | End: 2020-01-17
Attending: FAMILY MEDICINE
Payer: MEDICARE

## 2020-01-17 DIAGNOSIS — Z12.31 VISIT FOR SCREENING MAMMOGRAM: ICD-10-CM

## 2020-01-17 PROCEDURE — 77063 BREAST TOMOSYNTHESIS BI: CPT

## 2020-01-27 RX ORDER — ROSUVASTATIN CALCIUM 10 MG/1
10 TABLET, COATED ORAL
Qty: 30 TAB | Refills: 6 | Status: SHIPPED | OUTPATIENT
Start: 2020-01-27 | End: 2020-09-09

## 2020-01-27 RX ORDER — ROSUVASTATIN CALCIUM 10 MG/1
TABLET, COATED ORAL
Qty: 30 TAB | Refills: 5 | OUTPATIENT
Start: 2020-01-27

## 2020-02-21 ENCOUNTER — OFFICE VISIT (OUTPATIENT)
Dept: CARDIOLOGY CLINIC | Age: 80
End: 2020-02-21

## 2020-02-21 VITALS
DIASTOLIC BLOOD PRESSURE: 76 MMHG | WEIGHT: 155 LBS | OXYGEN SATURATION: 96 % | BODY MASS INDEX: 27.46 KG/M2 | SYSTOLIC BLOOD PRESSURE: 126 MMHG | HEIGHT: 63 IN | HEART RATE: 70 BPM

## 2020-02-21 DIAGNOSIS — Z95.1 S/P CABG X 3: ICD-10-CM

## 2020-02-21 DIAGNOSIS — I48.0 PAF (PAROXYSMAL ATRIAL FIBRILLATION) (HCC): ICD-10-CM

## 2020-02-21 DIAGNOSIS — I25.119 CORONARY ARTERY DISEASE INVOLVING NATIVE CORONARY ARTERY OF NATIVE HEART WITH ANGINA PECTORIS (HCC): Primary | ICD-10-CM

## 2020-02-21 DIAGNOSIS — I10 ESSENTIAL HYPERTENSION: ICD-10-CM

## 2020-02-21 DIAGNOSIS — E78.00 HYPERCHOLESTEREMIA: ICD-10-CM

## 2020-02-21 NOTE — PROGRESS NOTES
HISTORY OF PRESENT ILLNESS  Seda Briggs is a [de-identified] y.o. female. Shortness of Breath   Associated symptoms include leg swelling. Pertinent negatives include no fever, no headaches, no cough, no wheezing, no PND, no orthopnea, no chest pain, no vomiting, no abdominal pain, no rash and no claudication. Dizziness   Associated symptoms include shortness of breath. Pertinent negatives include no chest pain, no abdominal pain and no headaches. Follow-up   Associated symptoms include shortness of breath. Pertinent negatives include no chest pain, no abdominal pain and no headaches. Patient presents for a follow-up office visit. She was previously followed by Dr. Lori Turner. She has a past medical history significant for coronary disease with a prior inferolateral myocardial infarction, found to have three-vessel coronary disease on cardiac catheterization in October 2015, subsequently undergoing three-vessel bypass surgery later that month using a LIMA to LAD, SVG to OM and SVG to distal RCA at [facility]. Patient last underwent noninvasive cardiac imaging in April 2018 including a pharmacologic nuclear stress test and an echocardiogram her stress test was negative for ischemia or infarction, EF 64%. Patient underwent a repeat echocardiogram in April 2019 which showed a low normal left ventricular ejection fraction of 51 to 55% with mild concentric LVH but no valvular heart disease. She then wore a Holter monitor and a 30-day event monitor in May and June 2019 to evaluate her heart palpitations. She was found to have several episodes of atrial fibrillation with heart rates up to 140 bpm, longest episode lasted for 2 hours. Patient reports that she was symptomatic with the episodes. She was last seen in our office approximately 6 months ago. Since last visit, she states she has been feeling quite well. She is had no recurrent heart palpitations.   She does complain of intermittent dizziness when she stands up quickly or bends over, however, the symptoms are short-lived lasting only a few seconds at most in duration. No associated syncope or near syncope. No major change in her activity level. Describes chronic shortness of breath with heavy activity but this is unchanged. She also complains of intermittent swelling in her feet and ankles at the end of the day but this improves at night. She recently tripped and fell injuring her left foot is now wearing a walking boot. Past Medical History:   Diagnosis Date    Abnormal finding on EKG 3/26/2014    s/o inf wall mi asymptomatic     Anxiety     Ataxic gait 10/21/2010    [facility]    left side    CAD (coronary artery disease) 10/2015    LM normal, LAD mid 70%, OM1 diffuse 80%, RCA dominant with distal 99% on cardiac catheterization    Cardiac nuclear imaging test 11/29/2016    Low risk. Likely inferior basal prior injury. No ischemia. EF 65%. Mild inferobasal hypk. Neg EKG on pharm stress test.    Diverticulosis     FHx: breast cancer     GERD (gastroesophageal reflux disease)     Herpes     Hypercholesterolemia     Hypertension     Insomnia     Osteopenia     Osteoporosis screening 2008    WNL per patient, GYN orders    [facility]ox[facility]mal [facility] 06/07/2019    Diagnosed on 30-day event monitor    Pap smear 8/2010    GYN, normal    Pre-operative cardiovascular examination 3/26/2014    for shoulder surgery     Rheumatoid arthritis ([facility]     S/P CABG x 3 10/2015    LIMA to LAD, SVG to OM, SVG to distal RCA    S/P partial mastectomy, left, with sentinel lymph node biopsy, 5/11     Screening mammogram 6/2010    GYN orders     Current Outpatient Medications   Medication Sig Dispense Refill    rosuvastatin (CRESTOR) 10 mg tablet Take 1 Tab by mouth nightly. 30 Tab 6    bisacodyl (DULCOLAX) 5 mg EC tablet Take 1 Tab by mouth daily.  7 Tab 0    cholecalciferol (VITAMIN D3) 1,000 unit cap Take  by mouth daily.      predniSONE (DELTASONE) 1 mg tablet Take 4 mg by mouth daily. Indications: rheumatoid arthritis      BABY ASPIRIN PO Take 81 mg by mouth daily.  QUEtiapine (SEROQUEL) 25 mg tablet Take 25 mg by mouth nightly.  meloxicam (MOBIC) 7.5 mg tablet Take 2 Tabs by mouth daily. 30 Tab 0    triamterene-hydroCHLOROthiazide (MAXZIDE) 75-50 mg per tablet Take 0.5 Tabs by mouth daily.  MULTIVITAMINS (MULTIVITAMIN PO) Take  by mouth daily. Allergies   Allergen Reactions    Latex Unable to Obtain     To tape with latex    Norco [Hydrocodone-Acetaminophen] Swelling     Patient experienced throat swelling, hoarseness and difficulty swallowing.  Adhesive Hives    Codeine Nausea Only     Pt states she is not allergic. Social History     Tobacco Use    Smoking status: Never Smoker    Smokeless tobacco: Never Used    Tobacco comment: quit age 26   Substance Use Topics    Alcohol use: No     Frequency: Never    Drug use: No     Family History   Problem Relation Age of Onset   [employer] Cancer Mother 48        Breast   [employer] Breast Cancer Mother 48    Heart Disease Father     Cancer Sister 76        Breast    Breast Cancer Sister 76    Heart Disease Brother     Diabetes Brother     Diabetes Sister          Review of Systems   Constitutional: Negative for chills, fever and weight loss. HENT: Negative for nosebleeds. Eyes: Negative for blurred vision and double vision. Respiratory: Positive for shortness of breath. Negative for cough and wheezing. Cardiovascular: Positive for leg swelling. Negative for chest pain, palpitations, orthopnea, claudication and PND. Gastrointestinal: Negative for abdominal pain, heartburn, nausea and vomiting. Genitourinary: Negative for dysuria and hematuria. Musculoskeletal: Negative for falls and myalgias. Skin: Negative for rash. Neurological: Negative for dizziness, focal weakness and headaches.    Endo/Heme/Allergies: Does not bruise/bleed easily. Psychiatric/Behavioral: Negative for substance abuse. Visit Vitals  /76 (BP 1 Location: Left arm, BP Patient Position: Sitting)   Pulse 70   Ht 5' 3\" (1.6 m)   Wt 70.3 kg (155 lb)   LMP  (LMP Unknown)   SpO2 96%   BMI 27.46 kg/m²       Physical Exam   Constitutional: She is oriented to person, place, and time. She appears well-developed and well-nourished. HENT:   Head: Normocephalic and atraumatic. Eyes: Conjunctivae are normal.   Neck: Neck supple. No JVD present. Carotid bruit is not present. Cardiovascular: Normal rate, regular rhythm, S1 normal, S2 normal and normal pulses. No extrasystoles are present. Exam reveals no gallop and no distant heart sounds. No murmur heard. Pulmonary/Chest: Effort normal. She has no decreased breath sounds. She has no wheezes. She has no rhonchi. She has no rales. Abdominal: Soft. Bowel sounds are normal. There is no abdominal tenderness. Musculoskeletal:         General: No edema. Neurological: She is alert and oriented to person, place, and time. Skin: Skin is warm and dry. EKG: Normal sinus rhythm, normal axis, normal QTc interval, low voltage throughout, no ST or T wave abnormalities concerning for ischemia. No change compared to the previous EKG. ASSESSMENT and PLAN    Paroxysmal atrial fibrillation. This was documented on both Holter monitor and an echocardiogram in May/June 2019. Her longest episode lasted for 2 hours for which she was symptomatic. Her heart rates were about 140 bpm.  Her metoprolol dosage was increased to 50 mg daily, however, the patient reports that this medication was changed by her PCP. She did not wish to be started on anticoagulation. She has had no recurrent heart palpitations for the past 8 months. Patient is willing to start oral anticoagulation if she does have recurrent heart palpitations.   I have asked the patient to call us when she gets home with her new medication list.    Coronary artery disease. Status post three-vessel bypass surgery in October 2015 using a LIMA to LAD, SVG to OM, and SVG to distal RCA. She last underwent a pharmacological nuclear stress test in April 2018 which is a low risk study. She remains on aspirin, statin and beta-blocker, which I would continue. No new symptoms concerning for angina. Essential hypertension. Patient blood pressure appears reasonable on her current medical regimen, which I would continue. Dyslipidemia. Patient is now taking rosuvastatin 10 mg daily. This is followed by her PCP. Her LDL should be less than 70 from a cardiac standpoint. Follow-up in 6 months, sooner if needed.

## 2020-02-21 NOTE — PROGRESS NOTES
Jewel Fallon presents today for   Chief Complaint   Patient presents with    Irregular Heart Beat     6 month follow up     Dizziness     sometimes    Leg Swelling     mild    Shortness of Breath     exertion       Jeewl Fallon preferred language for health care discussion is english/other. Is someone accompanying this pt? no    Is the patient using any DME equipment during [address]? no    Depression Screening:  3 most recent PHQ Screens 2/21/2020   PHQ Not Done -   Little interest or pleasure in doing things Not at all   Feeling down, depressed, irritable, or hopeless Not at all   Total Score PHQ 2 0       Learning Assessment:  Learning Assessment 4/17/2019   PRIMARY LEARNER Patient   HIGHEST LEVEL OF EDUCATION - PRIMARY LEARNER  -   BARRIERS PRIMARY LEARNER -   PRIMARY LANGUAGE ENGLISH   LEARNER PREFERENCE PRIMARY DEMONSTRATION     -     -     -   ANSWERED BY Patient   RELATIONSHIP SELF       Abuse Screening:  Abuse Screening Questionnaire 3/28/2014   Do you ever feel afraid of your partner? N   Are you in a relationship with someone who physically or mentally threatens you? N   Is it safe for you to go home? Y       Fall Risk  Fall Risk Assessment, last 12 mths 2/21/2020   Able to walk? Yes   Fall in past 12 months? No   Fall with injury? -   Number of falls in past 12 months -   Fall Risk Score -       Pt currently taking Anticoagulant therapy? ASA 81mg every day     Coordination of Care:  1. Have you been to the ER, urgent care clinic since your last visit? Hospitalized since your last visit? no    2. Have you seen or consulted any other health care providers outside of the [address] [place] [address] since your last visit? Include any pap smears or colon screening.  no

## 2020-06-29 ENCOUNTER — OFFICE VISIT (OUTPATIENT)
Dept: ORTHOPEDIC SURGERY | Facility: CLINIC | Age: 80
End: 2020-06-29

## 2020-06-29 VITALS
HEIGHT: 63 IN | OXYGEN SATURATION: 98 % | SYSTOLIC BLOOD PRESSURE: 149 MMHG | DIASTOLIC BLOOD PRESSURE: 83 MMHG | WEIGHT: 153.6 LBS | HEART RATE: 68 BPM | TEMPERATURE: 98.8 F | BODY MASS INDEX: 27.21 KG/M2

## 2020-06-29 DIAGNOSIS — G89.29 CHRONIC LEFT HIP PAIN: ICD-10-CM

## 2020-06-29 DIAGNOSIS — M16.12 PRIMARY OSTEOARTHRITIS OF LEFT HIP: ICD-10-CM

## 2020-06-29 DIAGNOSIS — M25.551 HIP PAIN, RIGHT: ICD-10-CM

## 2020-06-29 DIAGNOSIS — M16.11 PRIMARY OSTEOARTHRITIS OF RIGHT HIP: Primary | ICD-10-CM

## 2020-06-29 DIAGNOSIS — M25.552 CHRONIC LEFT HIP PAIN: ICD-10-CM

## 2020-06-29 NOTE — PROGRESS NOTES
Patient: Fidencio Severance                MRN: [id number]       SSN: [id number]  YOB: 1940        AGE: [de-identified] y.o. SEX: female    PCP: Jordon Lantigua MD  06/29/20    Chief Complaint   Patient presents with    Hip Pain     bilateral     HISTORY:  Fidencio Severance is a [de-identified] y.o. female who is seen for bilateral hip pain R>L. She has been experiencing lateral right hip pain for the past several months. She does not recall any injury. She feels pain in her  lateral hip with standing and walking. Her hip stiffens up after she sits for long periods of time. She was previously seen for left shoulder pain following an injury. She states she tripped over a rubber mat at work on 3/9/18 and injured her left shoulder. She was seen at [facility] [place] ED on 3/12/18 where x-rays were negative fracture. She was last seen for left shoulder pain in August of 2015. She states she is having trouble with left shoulder ROM. She feels left shoulder pain with overhead activities and at night. She is s/p right RCR on 4/2/14. Pain Assessment  6/29/2020   Location of Pain Hip   Location Modifiers Left;Right   Severity of Pain 10   Quality of Pain Burning   Duration of Pain Persistent   Frequency of Pain Constant   Aggravating Factors Standing   Aggravating Factors Comment sitting   Limiting Behavior Yes   Relieving Factors Nothing   Result of Injury No   Work-Related Injury -   Type of Injury -     Occupation, etc:  Ms. Layla Garvey previously worked at [employer] a [employer] in [place]. She stopped working at [employer] a year ago. 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 She lives alone in [place], right by Rudy. She has 1 adult son. She has 4 grandchildren that live in [place]. She responded nicely to an aquatic therapy program at the [facility] in the past. Current weight is 155 pounds. She is 5'2\" tall.       Lab Results   Component Value Date/Time    Hemoglobin A1c 5.7 05/12/2010 09:50 AM     Weight Metrics 6/29/2020 2/21/2020 12/4/2019 11/18/2019 11/8/2019 10/25/2019 8/15/2019   Weight 153 lb 9.6 oz 155 lb 164 lb 162 lb 6 oz 163 lb 165 lb 166 lb   BMI 27.21 kg/m2 27.46 kg/m2 29.05 kg/m2 28.76 kg/m2 28.87 kg/m2 29.23 kg/m2 29.41 kg/m2       Patient Active Problem List   Diagnosis Code    HTN (hypertension) I10    Hypercholesteremia E78.00    Anxiety F41.9    Vitamin D deficiency E55.9    Ataxic gait R26.0    Insomnia G47.00    Breast [facility] C50.919    S/P partial mastectomy, left, with sentinel lymph node biopsy, 5/11 Z90.10    FHx: breast cancer Z80.3    Pre-operative cardiovascular examination Z01.810    Abnormal finding on EKG R94.31    Personal history of malignant neoplasm of breast Z85.3    Coronary artery disease involving native coronary artery of native heart with angina pectoris [facility] I25.119    Coronary artery disease involving native coronary artery of native heart without angina pectoris I25.10    Precordial pain R07.2    Incisional hernia without mention of obstruction or gangrene K43.2    S/P CABG x 3 Z95.1    PAF [facility]oxysmal atrial fibrillation) ([facility] I48.0     REVIEW OF SYSTEMS: All Below are Negative except: See HPI   Constitutional: negative for fever, chills, and weight loss. Cardiovascular: negative for chest pain, claudication, leg swelling, SOB, RODRIGUEZ   Gastrointestinal: Negative for pain, N/V/C/D, Blood in stool or urine, dysuria,  hematuria, incontinence, pelvic pain. Musculoskeletal: See HPI   Neurological: Negative for dizziness and weakness. Negative for headaches, Visual changes, confusion, seizures   Phychiatric/Behavioral: Negative for depression, memory loss, substance  abuse. Extremities: Negative for hair changes, rash, or skin lesion changes. Hematologic: Negative for bleeding problems, bruising, pallor or swollen lymph  nodes   Peripheral Vascular: No calf pain, no circulation deficits.     Social History Socioeconomic History    Marital status:      Spouse name: Not on file    Number of children: Not on file    Years of education: Not on file    Highest education level: Not on file   Occupational History    Not on file   Social Needs    Financial resource strain: Not on file    Food insecurity     Worry: Not on file     Inability: Not on file    Transportation needs     Medical: Not on file     Non-medical: Not on file   Tobacco Use    Smoking status: Never Smoker    Smokeless tobacco: Never Used    Tobacco comment: quit age 26   Substance and Sexual Activity    Alcohol use: No     Frequency: Never    Drug use: No    Sexual activity: Not Currently   Lifestyle    Physical activity     Days per week: Not on file     Minutes per session: Not on file    Stress: Not on file   Relationships    Social connections     Talks on phone: Not on file     Gets together: Not on file     Attends Yarsani service: Not on file     Active member of club or organization: Not on file     Attends meetings of clubs or organizations: Not on file     Relationship status: Not on file    Intimate partner violence     Fear of current or ex partner: Not on file     Emotionally abused: Not on file     Physically abused: Not on file     Forced sexual activity: Not on file   Other Topics Concern    Not on file   Social History Narrative    Not on file      Allergies   Allergen Reactions    Latex Unable to Obtain     To tape with latex    Norco [Hydrocodone-Acetaminophen] Swelling     Patient experienced throat swelling, hoarseness and difficulty swallowing.  Adhesive Hives    Codeine Nausea Only     Pt states she is not allergic. Current Outpatient Medications   Medication Sig    rosuvastatin (CRESTOR) 10 mg tablet Take 1 Tab by mouth nightly.  cholecalciferol (VITAMIN D3) 1,000 unit cap Take  by mouth daily.  BABY ASPIRIN PO Take 81 mg by mouth daily.     QUEtiapine (SEROQUEL) 25 mg tablet Take 25 mg by mouth nightly.  MULTIVITAMINS (MULTIVITAMIN PO) Take  by mouth daily.  bisacodyl (DULCOLAX) 5 mg EC tablet Take 1 Tab by mouth daily.  predniSONE (DELTASONE) 1 mg tablet Take 4 mg by mouth daily. Indications: rheumatoid arthritis    meloxicam (MOBIC) 7.5 mg tablet Take 2 Tabs by mouth daily.  triamterene-hydroCHLOROthiazide (MAXZIDE) 75-50 mg per tablet Take 0.5 Tabs by mouth daily. No current facility-administered medications for this visit. PHYSICAL EXAMINATION:  Visit Vitals  /83 (BP 1 Location: Left arm)   Pulse 68   Temp 98.8 °F (37.1 °C)   Ht 5' 3\" (1.6 m)   Wt 153 lb 9.6 oz (69.7 kg)   LMP  (LMP Unknown)   SpO2 98%   BMI 27.21 kg/m²      ORTHO EXAMINATION:  Examination Right hip Left hip   Skin Intact Intact   External Rotation ROM 10 20   Internal Rotation ROM 5 10   Trochanteric tenderness + +   Hip flexion contracture - -   Antalgic gait - -   Trendelenberg sign - -   Lumbar tenderness - -   Straight leg raise - -   Calf tenderness - -   Neurovascular Intact Intact      RADIOGRAPHS:  XR RIGHT HIP 6/29/20 NEHEMIAS   IMPRESSION:  AP pelvis and two views - No fractures, moderate L>R joint space narrowing, no osteophytes present. Tonnis grade 3     XR LEFT RIB 3/12/18  IMPRESSION: No acute pulmonary disease. No left rib fracture. XR FACIAL BONES 3/12/18  IMPRESSION: Normal.     XR LEFT SHOULDER 3/12/18  IMPRESSION: Normal left shoulder.   -I have independently reviewed these images during this office visit. -Dr. Josephine Ward:      ICD-10-CM ICD-9-CM    1. Primary osteoarthritis of right hip M16.11 715.15 REFERRAL TO PHYSICAL THERAPY   2. Hip pain, right M25.551 719.45 AMB POC X-RAY RADEX HIP UNI WITH PELVIS 2-3 VIEWS      REFERRAL TO PHYSICAL THERAPY   3. Chronic left hip pain M25.552 719.45 REFERRAL TO PHYSICAL THERAPY    G89.29 338.29    4.  Primary osteoarthritis of left hip M16.12 715.15 REFERRAL TO PHYSICAL THERAPY     PLAN:  She will start a brief course of outpatient physical therapy. There is no need for surgery at this time. She will follow up as needed.       Scribed by Leticia Gross (Ansley oLpez) as dictated by Al Hook MD

## 2020-07-14 ENCOUNTER — HOSPITAL ENCOUNTER (OUTPATIENT)
Dept: PHYSICAL THERAPY | Age: 80
Discharge: HOME | End: 2020-07-14
Payer: MEDICARE

## 2020-07-14 PROCEDURE — 97162 PT EVAL MOD COMPLEX 30 MIN: CPT

## 2020-07-14 PROCEDURE — 97110 THERAPEUTIC EXERCISES: CPT

## 2020-07-14 NOTE — PROGRESS NOTES
PT DAILY TREATMENT NOTE 10-18    Patient Name: Santos Olmstead  Date:2020  : 1940  [x]  Patient  Verified  Payor: Elizabeth Casper / Plan: [place] MED[place]ARE PART A & B / Product Type: Medicare /    In time:307  Out time:335  Total Treatment Time (min): 28  Visit #: 1 of 10    Medicare/BCBS Only   Total Timed Codes (min):  8 1:1 Treatment Time:  28       Treatment Area: Pain of right hip [M25.551]  Left hip pain [M25.552]    SUBJECTIVE  Pain Level (0-10 scale): 5  Any medication changes, allergies to medications, adverse drug reactions, diagnosis change, or new procedure performed?: [x] No    [] Yes (see summary sheet for update)  Subjective functional status/changes:   [] No changes reported      OBJECTIVE    Modality rationale:    Min Type Additional Details    [] Estim:  []Unatt       []IFC  []Premod                        []Other:  []w/ice   []w/heat  Position:  Location:    [] Estim: []Att    []TENS instruct  []NMES                    []Other:  []w/US   []w/ice   []w/heat  Position:  Location:    []  Traction: [] Cervical       []Lumbar                       [] Prone          []Supine                       []Intermittent   []Continuous Lbs:  [] before manual  [] after manual    []  Ultrasound: []Continuous   [] Pulsed                           []1MHz   []3MHz W/cm2:  Location:    []  Iontophoresis with dexamethasone         Location: [] Take home patch   [] In clinic    []  Ice     []  heat  []  Ice massage  []  Laser   []  Anodyne Position:  Location:    []  Laser with stim  []  Other:  Position:  Location:    []  Vasopneumatic Device Pressure:       [] lo [] med [] hi   Temperature: [] lo [] med [] hi   [] Skin assessment post-treatment:  []intact []redness- no adverse reaction    []redness  adverse reaction:     20 min [x]Eval                  []Re-Eval       8 min Therapeutic Exercise:  [] See flow sheet :HEP   Rationale: increase strength to improve the patients ability to improve ease of transfers      With   [] TE   [] TA   [] neuro   [] other: Patient Education: [x] Review HEP    [] Progressed/Changed HEP based on:   [] positioning   [] body mechanics   [] transfers   [] heat/ice application    [] other:      Other Objective/Functional Measures:      Pain Level (0-10 scale) post treatment: 5    ASSESSMENT/Changes in Function:     Patient will continue to benefit from skilled PT services to modify and progress therapeutic interventions, address functional mobility deficits, address ROM deficits, address strength deficits, analyze and address soft tissue restrictions, analyze and cue movement patterns, analyze and modify body mechanics/ergonomics, assess and modify postural abnormalities, address imbalance/dizziness and instruct in home and community integration to attain remaining goals.      [x]  See Plan of Care  []  See progress note/recertification  []  See Discharge Summary         Progress towards goals / Updated goals:  See POC    PLAN  []  Upgrade activities as tolerated     [x]  Continue plan of care  []  Update interventions per flow sheet       []  Discharge due to:_  []  Other:_      Yanni Sandhu PT 7/14/2020  3:33 PM    Future Appointments   Date Time Provider Nain Marshall   8/28/2020 10:40 AM Alicja Valentine MD [address] [place] [address]

## 2020-07-21 ENCOUNTER — HOSPITAL ENCOUNTER (OUTPATIENT)
Dept: PHYSICAL THERAPY | Age: 80
Discharge: HOME | End: 2020-07-21
Payer: MEDICARE

## 2020-07-21 PROCEDURE — 97113 AQUATIC THERAPY/EXERCISES: CPT

## 2020-07-23 ENCOUNTER — HOSPITAL ENCOUNTER (OUTPATIENT)
Dept: PHYSICAL THERAPY | Age: 80
Discharge: HOME | End: 2020-07-23
Payer: MEDICARE

## 2020-07-23 PROCEDURE — 97113 AQUATIC THERAPY/EXERCISES: CPT

## 2020-07-28 ENCOUNTER — HOSPITAL ENCOUNTER (OUTPATIENT)
Dept: PHYSICAL THERAPY | Age: 80
Discharge: HOME | End: 2020-07-28
Payer: MEDICARE

## 2020-07-28 PROCEDURE — 97113 AQUATIC THERAPY/EXERCISES: CPT

## 2020-07-30 ENCOUNTER — HOSPITAL ENCOUNTER (OUTPATIENT)
Dept: PHYSICAL THERAPY | Age: 80
Discharge: HOME | End: 2020-07-30
Payer: MEDICARE

## 2020-07-30 PROCEDURE — 97113 AQUATIC THERAPY/EXERCISES: CPT

## 2020-08-04 ENCOUNTER — APPOINTMENT (OUTPATIENT)
Dept: PHYSICAL THERAPY | Age: 80
End: 2020-08-04
Payer: MEDICARE

## 2020-08-06 ENCOUNTER — HOSPITAL ENCOUNTER (OUTPATIENT)
Dept: PHYSICAL THERAPY | Age: 80
Discharge: HOME | End: 2020-08-06
Payer: MEDICARE

## 2020-08-06 PROCEDURE — 97113 AQUATIC THERAPY/EXERCISES: CPT

## 2020-08-06 NOTE — PROGRESS NOTES
[facility] NATI RAMIREZ [facility], [address] [place] [address]  [phone] fax    Discharge Summary    Patient name: Juliette Gallardo Start of Care: 2020   Referral source: Hilario Rutherford MD : 1940   Medical/Treatment Diagnosis: Pain of right hip [M25.551]  Left hip pain [M25.552]  Payor: [place] MEDICARE / Plan: [place] MEDICARE PART A & B / Product Type: Medicare /  Onset Date:3-4 months ago     Prior Hospitalization: see medical history Provider#: [id number]   Medications: Verified on Patient Summary List    Comorbidities: heart disease, triple bypass 2016, HTN   Prior Level of Function: Functionally independent, lives alone in apartment, member of [employer], enjoys cooking    Visits from [facility] of Care: 6    Missed Visits: 0    Reporting Period : 20 to 20    Short Term Goals: To be accomplished in 1 weeks:  Goal: Patient will be independent and compliant with HEP in order to improve ease of transfers. Status at last note/certification: issued and reviewed  Status at discharge: met  Goal: Patient will initiate aquatic therapy without incident or increased pain in order to progress toward long term goals. Status at last note/certification: n/a  Status at discharge: met  Long Term Goals: To be accomplished in 10 treatments:  Goal: Patient will improve FOTO assessment score to 58 pts in order to indicate improved functional abilities. Status at last note/certification: 38 pts  Status at discharge: progressing, 55 pts  Goal: Patient will improve bilateral hip abduction strength to 5/5 in order to improve stability with ambulation, stair negotiation. Status at last note/certification: 4/5 bilaterally  Status at discharge: met  Goal: Patient will be able to perform 5 repetitions of sit to stands in 16 seconds or less in order to improve ease of daily tasks and indicate improved stability.   Status at last note/certification: 23 seconds  Status at discharge: progressing, 18 seconds  Goal: Patient will report average bilateral hip pain as 2-3/10 or less in order to progress toward premorbid activity levels. Status at last note/certification: 8/37  Status at discharge: met, 1-2/10    Assessment/ Summary of Care: Patient has consistently attended aquatic therapy for bilateral hip pain, with reports of good reduction in pain and improvement in activity tolerance. Improved hip strength. Due to gains, and Patient being able to resume aquatic classes at the [facility], we will discharge. Thank you for the referral of this Patient.      RECOMMENDATIONS:  [x]Discontinue therapy: [x]Patient has reached or is progressing toward set goals      []Patient is non-compliant or has abdicated      []Due to lack of appreciable progress towards set [address] [facility], PT 8/6/2020 11:02 AM

## 2020-08-28 ENCOUNTER — OFFICE VISIT (OUTPATIENT)
Dept: CARDIOLOGY CLINIC | Age: 80
End: 2020-08-28

## 2020-08-28 VITALS
OXYGEN SATURATION: 98 % | SYSTOLIC BLOOD PRESSURE: 122 MMHG | HEART RATE: 71 BPM | BODY MASS INDEX: 27.29 KG/M2 | DIASTOLIC BLOOD PRESSURE: 80 MMHG | HEIGHT: 63 IN | WEIGHT: 154 LBS

## 2020-08-28 DIAGNOSIS — E78.00 HYPERCHOLESTEREMIA: ICD-10-CM

## 2020-08-28 DIAGNOSIS — I10 ESSENTIAL HYPERTENSION: ICD-10-CM

## 2020-08-28 DIAGNOSIS — I48.0 PAF (PAROXYSMAL ATRIAL FIBRILLATION) (HCC): ICD-10-CM

## 2020-08-28 DIAGNOSIS — Z95.1 S/P CABG X 3: ICD-10-CM

## 2020-08-28 DIAGNOSIS — I25.119 CORONARY ARTERY DISEASE INVOLVING NATIVE CORONARY ARTERY OF NATIVE HEART WITH ANGINA PECTORIS (HCC): Primary | ICD-10-CM

## 2020-08-28 RX ORDER — METOPROLOL TARTRATE 25 MG/1
TABLET, FILM COATED ORAL DAILY
COMMUNITY

## 2020-08-28 NOTE — PROGRESS NOTES
HISTORY OF PRESENT ILLNESS  Zenia Spaulding is a 79 y.o. female. Follow-up   Pertinent negatives include no chest pain, no abdominal pain, no headaches and no shortness of breath. Patient presents for a follow-up office visit. She was previously followed by Dr. Lydia Calderon. She has a past medical history significant for coronary disease with a prior inferolateral myocardial infarction, found to have three-vessel coronary disease on cardiac catheterization in October 2015, subsequently undergoing three-vessel bypass surgery later that month using a LIMA to LAD, SVG to OM and SVG to distal RCA at [place] [id number]. Patient last underwent noninvasive cardiac imaging in April 2018 including a pharmacologic nuclear stress test and an echocardiogram her stress test was negative for ischemia or infarction, EF 64%. Patient underwent a repeat echocardiogram in April 2019 which showed a low normal left ventricular ejection fraction of 51 to 55% with mild concentric LVH but no valvular heart disease. She then wore a Holter monitor and a 30-day event monitor in May and June 2019 to evaluate her heart palpitations. She was found to have several episodes of atrial fibrillation with heart rates up to 140 bpm, longest episode lasted for 2 hours. Patient reports that she was symptomatic with the episodes. She was last seen in our office approximately 6 months ago. Since last visit, she continues to feel well. She denies any recurrent heart palpitations, dizziness or syncope. No new chest pain or shortness of breath. She is not exercising again at the [place] [facility] 3 times a week.     Past Medical History:   Diagnosis Date    Abnormal finding on EKG 3/26/2014    s/o inf wall mi asymptomatic     Anxiety     Ataxic gait 10/21/2010    Breast cancer ([facility] [address] 05/2014    left side    CAD (coronary artery disease) 10/2015    LM normal, LAD mid 70%, OM1 diffuse 80%, RCA dominant with distal 99% on cardiac catheterization    Cardiac nuclear imaging test 11/29/2016    Low risk. Likely inferior basal prior injury. No ischemia. EF 65%. Mild inferobasal hypk. Neg EKG on pharm stress test.    Diverticulosis     FHx: breast cancer     GERD (gastroesophageal reflux disease)     Herpes     Hypercholesterolemia     Hypertension     Insomnia     Osteopenia     Osteoporosis screening 2008    WNL per patient, GYN orders    PAF (paroxysmal atrial fibrillation) ([facility] [address] 06/07/2019    Diagnosed on 30-day event monitor    Pap smear 8/2010    GYN, normal    Pre-operative cardiovascular examination 3/26/2014    for shoulder surgery     Rheumatoid arthritis ([facility] [address]     S/P CABG x 3 10/2015    LIMA to LAD, SVG to OM, SVG to distal RCA    S/P partial mastectomy, left, with sentinel lymph node biopsy, 5/11     Screening mammogram 6/2010    GYN orders     Current Outpatient Medications   Medication Sig Dispense Refill    metoprolol tartrate (LOPRESSOR) 25 mg tablet Take  by mouth daily.  rosuvastatin (CRESTOR) 10 mg tablet Take 1 Tab by mouth nightly. 30 Tab 6    bisacodyl (DULCOLAX) 5 mg EC tablet Take 1 Tab by mouth daily. 7 Tab 0    cholecalciferol (VITAMIN D3) 1,000 unit cap Take  by mouth daily.  BABY ASPIRIN PO Take 81 mg by mouth daily.  QUEtiapine (SEROQUEL) 25 mg tablet Take 25 mg by mouth nightly.  MULTIVITAMINS (MULTIVITAMIN PO) Take  by mouth daily. Allergies   Allergen Reactions    Latex Unable to Obtain     To tape with latex    Norco [Hydrocodone-Acetaminophen] Swelling     Patient experienced throat swelling, hoarseness and difficulty swallowing.  Adhesive Hives    Codeine Nausea Only     Pt states she is not allergic.       Social History     Tobacco Use    Smoking status: Never Smoker    Smokeless tobacco: Never Used    Tobacco comment: quit age 26   Substance Use Topics    Alcohol use: No     Frequency: Never    Drug use: No     Family History   Problem Relation Age of Onset    Cancer Mother 48        Breast    Breast Cancer Mother 48    Heart Disease Father     Cancer Sister 76        Breast    Breast Cancer Sister 76    Heart Disease Brother     Diabetes Brother     Diabetes Sister          Review of Systems   Constitutional: Negative for chills, fever and weight loss. HENT: Negative for nosebleeds. Eyes: Negative for blurred vision and double vision. Respiratory: Negative for cough, shortness of breath and wheezing. Cardiovascular: Negative for chest pain, palpitations, orthopnea, claudication, leg swelling and PND. Gastrointestinal: Negative for abdominal pain, heartburn, nausea and vomiting. Genitourinary: Negative for dysuria and hematuria. Musculoskeletal: Negative for falls and myalgias. Skin: Negative for rash. Neurological: Negative for dizziness, focal weakness and headaches. Endo/Heme/Allergies: Does not bruise/bleed easily. Psychiatric/Behavioral: Negative for substance abuse. Visit Vitals  /80 (BP 1 Location: Left arm, BP Patient Position: Sitting)   Pulse 71   Ht 5' 3\" (1.6 m)   Wt 69.9 kg (154 lb)   LMP  (LMP Unknown)   SpO2 98%   BMI 27.28 kg/m²       Physical Exam   Constitutional: She is oriented to person, place, and time. She appears well-developed and well-nourished. HENT:   Head: Normocephalic and atraumatic. Eyes: Conjunctivae are normal.   Neck: Neck supple. No JVD present. Carotid bruit is not present. Cardiovascular: Normal rate, regular rhythm, S1 normal, S2 normal and normal pulses. No extrasystoles are present. Exam reveals no gallop and no distant heart sounds. No murmur heard. Pulmonary/Chest: Effort normal. She has no decreased breath sounds. She has no wheezes. She has no rhonchi. She has no rales. Abdominal: Soft. Bowel sounds are normal. There is no abdominal tenderness. Musculoskeletal:         General: No edema. Neurological: She is alert and oriented to person, place, and time.    Skin: Skin is warm and dry.     ASSESSMENT and PLAN    Paroxysmal atrial fibrillation. This was documented on both Holter monitor and an echocardiogram in May/June 2019. Her longest episode lasted for 2 hours for which she was symptomatic. Her heart rates were about 140 bpm.  Her metoprolol XL dose is now 25 mg daily. She did not wish to be started on anticoagulation at that time. She has had no recurrent symptoms over the past year plus concerning for atrial fibrillation. Coronary artery disease. Status post three-vessel bypass surgery in October 2015 using a LIMA to LAD, SVG to OM, and SVG to distal RCA. She last underwent a pharmacological nuclear stress test in April 2018 which is a low risk study. She remains on aspirin, statin and beta-blocker, all of which I would continue. I have recommended repeating her nuclear stress test prior to her follow-up visit next year. Essential hypertension. Patient blood pressure appears reasonable on her current medical regimen, which I would continue. Dyslipidemia. Patient is now taking rosuvastatin 10 mg daily. This is followed by her PCP. Her LDL should be less than 70 from a cardiac standpoint. I requested a copy of her lipid panel for our records. Follow-up in 6 months, sooner if needed.

## 2020-08-28 NOTE — PROGRESS NOTES
Hernán Jarrell presents today for   Chief Complaint   Patient presents with    Follow-up     6 month follow up       Hernán Jarrell preferred language for health care discussion is english/other. Is someone accompanying this pt? no    Is the patient using any DME equipment during [address]? no    Depression Screening:  3 most recent PHQ Screens 8/28/2020   PHQ Not Done -   Little interest or pleasure in doing things Not at all   Feeling down, depressed, irritable, or hopeless Not at all   Total Score PHQ 2 0       Learning Assessment:  Learning Assessment 4/17/2019   PRIMARY LEARNER Patient   HIGHEST LEVEL OF EDUCATION - PRIMARY LEARNER  -   BARRIERS PRIMARY LEARNER -   PRIMARY LANGUAGE ENGLISH   LEARNER PREFERENCE PRIMARY DEMONSTRATION     -     -     -   ANSWERED BY Patient   RELATIONSHIP SELF       Abuse Screening:  Abuse Screening Questionnaire 8/28/2020   Do you ever feel afraid of your partner? N   Are you in a relationship with someone who physically or mentally threatens you? N   Is it safe for you to go home? -       Fall Risk  Fall Risk Assessment, last 12 mths 8/28/2020   Able to walk? Yes   Fall in past 12 months? No   Fall with injury? -   Number of falls in past 12 months -   Fall Risk Score -       Pt currently taking Anticoagulant therapy? No but is taking ASA 81 mg once a day    Coordination of Care:  1. Have you been to the ER, urgent care clinic since your last visit? Hospitalized since your last visit? no    2. Have you seen or consulted any other health care providers outside of the [address] [place] [address] since your last visit? Include any pap smears or colon screening.  no

## 2020-09-08 ENCOUNTER — HOSPITAL ENCOUNTER (OUTPATIENT)
Dept: NON INVASIVE DIAGNOSTICS | Age: 80
Discharge: HOME | End: 2020-09-08
Attending: INTERNAL MEDICINE
Payer: MEDICARE

## 2020-09-08 VITALS
HEIGHT: 63 IN | BODY MASS INDEX: 27.29 KG/M2 | WEIGHT: 154 LBS | SYSTOLIC BLOOD PRESSURE: 130 MMHG | DIASTOLIC BLOOD PRESSURE: 70 MMHG

## 2020-09-08 DIAGNOSIS — I25.119 CORONARY ARTERY DISEASE INVOLVING NATIVE CORONARY ARTERY OF NATIVE HEART WITH ANGINA PECTORIS (HCC): ICD-10-CM

## 2020-09-08 LAB
STRESS BASELINE DIAS BP: 70 MMHG
STRESS BASELINE HR: 63 BPM
STRESS BASELINE SYS BP: 130 MMHG
STRESS ESTIMATED WORKLOAD: 1 METS
STRESS EXERCISE DUR MIN: NORMAL
STRESS PEAK DIAS BP: 70 MMHG
STRESS PEAK SYS BP: 130 MMHG
STRESS PERCENT HR ACHIEVED: 67 %
STRESS POST PEAK HR: 93 BPM
STRESS RATE PRESSURE PRODUCT: NORMAL BPM*MMHG
STRESS ST DEPRESSION: 0 MM
STRESS ST ELEVATION: 0 MM
STRESS TARGET HR: 139 BPM

## 2020-09-08 PROCEDURE — 74011250636 HC RX REV CODE- 250/636: Performed by: INTERNAL MEDICINE

## 2020-09-08 PROCEDURE — 93017 CV STRESS TEST TRACING ONLY: CPT

## 2020-09-08 RX ORDER — SODIUM CHLORIDE 9 MG/ML
250 INJECTION, SOLUTION INTRAVENOUS ONCE
Status: COMPLETED | OUTPATIENT
Start: 2020-09-08 | End: 2020-09-08

## 2020-09-08 RX ADMIN — SODIUM CHLORIDE 250 ML/HR: 900 INJECTION, SOLUTION INTRAVENOUS at 09:25

## 2020-09-08 RX ADMIN — REGADENOSON 0.4 MG: 0.08 INJECTION, SOLUTION INTRAVENOUS at 09:25

## 2020-09-08 NOTE — PROGRESS NOTES
Patient was injected with 86.3 millicuries 65FQX Sestamibi on 9/8/20 at 0820. Patient was injected with 54.7 millicuries 98QMJ Sestamibi on 9/8/20 at 0925. Patient's armbands were removed and placed in shred-it box.     Patient had a Nuclear Lexiscan Stress Test.

## 2020-09-10 ENCOUNTER — TELEPHONE (OUTPATIENT)
Dept: CARDIOLOGY CLINIC | Age: 80
End: 2020-09-10

## 2020-09-10 NOTE — TELEPHONE ENCOUNTER
----- Message from Juany Ayala MD sent at 9/10/2020  1:19 PM EDT -----  Please let the patient know that her nuclear stress test was normal.  ----- Message -----  From: Xiomara Last RN  Sent: 9/10/2020   9:58 AM EDT  To: Juany Ayala MD    Per your last note \" Coronary artery disease. Status post three-vessel bypass surgery in October 2015 using a LIMA to LAD, SVG to OM, and SVG to distal RCA. She last underwent a pharmacological nuclear stress test in April 2018 which is a low risk study. She remains on aspirin, statin and beta-blocker, all of which I would continue. I have recommended repeating her nuclear stress test prior to her follow-up visit next year.

## 2020-09-10 NOTE — TELEPHONE ENCOUNTER
Verified patient name and . Gave her the Stress test results. Patient indicated her understanding of this. Reminded her of a February follow up.

## 2021-02-22 ENCOUNTER — OFFICE VISIT (OUTPATIENT)
Dept: CARDIOLOGY CLINIC | Age: 81
End: 2021-02-22
Payer: MEDICARE

## 2021-02-22 VITALS
SYSTOLIC BLOOD PRESSURE: 140 MMHG | HEIGHT: 63 IN | HEART RATE: 97 BPM | WEIGHT: 159 LBS | OXYGEN SATURATION: 96 % | BODY MASS INDEX: 28.17 KG/M2 | DIASTOLIC BLOOD PRESSURE: 80 MMHG

## 2021-02-22 DIAGNOSIS — I25.119 CORONARY ARTERY DISEASE INVOLVING NATIVE CORONARY ARTERY OF NATIVE HEART WITH ANGINA PECTORIS (HCC): Primary | ICD-10-CM

## 2021-02-22 DIAGNOSIS — E78.5 DYSLIPIDEMIA: ICD-10-CM

## 2021-02-22 DIAGNOSIS — E78.00 HYPERCHOLESTEREMIA: ICD-10-CM

## 2021-02-22 DIAGNOSIS — Z95.1 S/P CABG X 3: ICD-10-CM

## 2021-02-22 DIAGNOSIS — I10 ESSENTIAL HYPERTENSION: ICD-10-CM

## 2021-02-22 DIAGNOSIS — I48.0 PAF (PAROXYSMAL ATRIAL FIBRILLATION) (HCC): ICD-10-CM

## 2021-02-22 PROCEDURE — G8754 DIAS BP LESS 90: HCPCS | Performed by: INTERNAL MEDICINE

## 2021-02-22 PROCEDURE — G8419 CALC BMI OUT NRM PARAM NOF/U: HCPCS | Performed by: INTERNAL MEDICINE

## 2021-02-22 PROCEDURE — G8427 DOCREV CUR MEDS BY ELIG CLIN: HCPCS | Performed by: INTERNAL MEDICINE

## 2021-02-22 PROCEDURE — G8753 SYS BP > OR = 140: HCPCS | Performed by: INTERNAL MEDICINE

## 2021-02-22 PROCEDURE — G8510 SCR DEP NEG, NO PLAN REQD: HCPCS | Performed by: INTERNAL MEDICINE

## 2021-02-22 PROCEDURE — 1101F PT FALLS ASSESS-DOCD LE1/YR: CPT | Performed by: INTERNAL MEDICINE

## 2021-02-22 PROCEDURE — G8536 NO DOC ELDER MAL SCRN: HCPCS | Performed by: INTERNAL MEDICINE

## 2021-02-22 PROCEDURE — 93000 ELECTROCARDIOGRAM COMPLETE: CPT | Performed by: INTERNAL MEDICINE

## 2021-02-22 PROCEDURE — 1090F PRES/ABSN URINE INCON ASSESS: CPT | Performed by: INTERNAL MEDICINE

## 2021-02-22 PROCEDURE — 99214 OFFICE O/P EST MOD 30 MIN: CPT | Performed by: INTERNAL MEDICINE

## 2021-02-22 PROCEDURE — G8399 PT W/DXA RESULTS DOCUMENT: HCPCS | Performed by: INTERNAL MEDICINE

## 2021-02-22 NOTE — PROGRESS NOTES
Mike Thompson presents today for   Chief Complaint   Patient presents with    Follow-up     6 month follow up    Leg Swelling     Ankles       Mike Thompson preferred language for health care discussion is english/other. Is someone accompanying this pt? no    Is the patient using any DME equipment during [address]? no    Depression Screening:  3 most recent PHQ Screens 2/22/2021   PHQ Not Done -   Little interest or pleasure in doing things Not at all   Feeling down, depressed, irritable, or hopeless Not at all   Total Score PHQ 2 0       Learning Assessment:  Learning Assessment 2/22/2021   PRIMARY LEARNER Patient   HIGHEST LEVEL OF EDUCATION - PRIMARY LEARNER  -   BARRIERS PRIMARY LEARNER -   PRIMARY LANGUAGE ENGLISH   LEARNER PREFERENCE PRIMARY DEMONSTRATION     -     -     -   ANSWERED BY patient   RELATIONSHIP SELF       Abuse Screening:  Abuse Screening Questionnaire 2/22/2021   Do you ever feel afraid of your partner? N   Are you in a relationship with someone who physically or mentally threatens you? N   Is it safe for you to go home? Y       Fall Risk  Fall Risk Assessment, last 12 mths 2/22/2021   Able to walk? Yes   Fall in past 12 months? 0   Do you feel unsteady? 0   Are you worried about falling 0   Number of falls in past 12 months -   Fall with injury? -       Pt currently taking Anticoagulant therapy? no    Coordination of Care:  1. Have you been to the ER, urgent care clinic since your last visit? Hospitalized since your last visit? no    2. Have you seen or consulted any other health care providers outside of the [address] [place] [address] since your last visit? Include any pap smears or colon screening.  no

## 2021-02-22 NOTE — PROGRESS NOTES
HISTORY OF PRESENT ILLNESS  Larry Taylor is a 79 y.o. female. Follow-up  Pertinent negatives include no chest pain, no abdominal pain, no headaches and no shortness of breath. Leg Swelling  Pertinent negatives include no chest pain, no abdominal pain, no headaches and no shortness of breath. Patient presents for a follow-up office visit. She was previously followed by Dr. Sukhdeep Shahid. She has a past medical history significant for coronary disease with a prior inferolateral myocardial infarction, found to have three-vessel coronary disease on cardiac catheterization in October 2015, subsequently undergoing three-vessel bypass surgery later that month using a LIMA to LAD, SVG to OM and SVG to distal RCA at [facility]. Patient last underwent noninvasive cardiac imaging in April 2018 including a pharmacologic nuclear stress test and an echocardiogram her stress test was negative for ischemia or infarction, EF 64%. Patient underwent a repeat echocardiogram in April 2019 which showed a low normal left ventricular ejection fraction of 51 to 55% with mild concentric LVH but no valvular heart disease. She then wore a Holter monitor and a 30-day event monitor in May and June 2019 to evaluate her heart palpitations. She was found to have several episodes of atrial fibrillation with heart rates up to 140 bpm, longest episode lasted for 2 hours. Patient reports that she was symptomatic with the episodes. Patient underwent a pharmacologic nuclear stress test in September 2020 which was a normal and low risk study. EF 63%, no evidence of ischemia or infarction. She was last seen in our office approximately 6 months ago. Since last visit, she has been feeling well. No major change in her activity level. No new chest pain, shortness of breath, leg swelling, orthopnea or PND.   Her only complaint is continued pain/tenderness over the scar of her prior sternotomy site which has been present for many years. Past Medical History:   Diagnosis Date    Abnormal finding on EKG 3/26/2014    s/o inf wall mi asymptomatic     Anxiety     Ataxic gait 10/21/2010    Breast cancer ([facility] [address] 05/2014    left side    CAD (coronary artery disease) 10/2015    LM normal, LAD mid 70%, OM1 diffuse 80%, RCA dominant with distal 99% on cardiac catheterization    Cardiac nuclear imaging test 11/29/2016    Low risk. Likely inferior basal prior injury. No ischemia. EF 65%. Mild inferobasal hypk. Neg EKG on pharm stress test.    Diverticulosis     FHx: breast cancer     GERD (gastroesophageal reflux disease)     Herpes     Hypercholesterolemia     Hypertension     Insomnia     Osteopenia     Osteoporosis screening 2008    WNL per patient, GYN orders    PAF (paroxysmal atrial fibrillation) ([facility] [address] 06/07/2019    Diagnosed on 30-day event monitor    Pap smear 8/2010    GYN, normal    Pre-operative cardiovascular examination 3/26/2014    for shoulder surgery     Rheumatoid arthritis ([facility] [address]     S/P CABG x 3 10/2015    LIMA to LAD, SVG to OM, SVG to distal RCA    S/P partial mastectomy, left, with sentinel lymph node biopsy, 5/11     Screening mammogram 6/2010    GYN orders     Current Outpatient Medications   Medication Sig Dispense Refill    rosuvastatin (CRESTOR) 10 mg tablet TAKE ONE TABLET BY MOUTH NIGHTLY 30 Tab 6    metoprolol tartrate (LOPRESSOR) 25 mg tablet Take  by mouth daily.  bisacodyl (DULCOLAX) 5 mg EC tablet Take 1 Tab by mouth daily. 7 Tab 0    cholecalciferol (VITAMIN D3) 1,000 unit cap Take  by mouth daily.  BABY ASPIRIN PO Take 81 mg by mouth daily.  QUEtiapine (SEROQUEL) 25 mg tablet Take 25 mg by mouth nightly.  MULTIVITAMINS (MULTIVITAMIN PO) Take  by mouth daily. Allergies   Allergen Reactions    Latex Unable to Obtain     To tape with latex    Norco [Hydrocodone-Acetaminophen] Swelling     Patient experienced throat swelling, hoarseness and difficulty swallowing.  Adhesive Hives    Codeine Nausea Only     Pt states she is not allergic. Social History     Tobacco Use    Smoking status: Never Smoker    Smokeless tobacco: Never Used    Tobacco comment: quit age 26   Substance Use Topics    Alcohol use: No     Frequency: Never    Drug use: No     Family History   Problem Relation Age of Onset   Jessica Solum Cancer Mother 48        Breast   Jessica Reddyum Breast Cancer Mother 48    Heart Disease Father     Cancer Sister 76        Breast    Breast Cancer Sister 76    Heart Disease Brother     Diabetes Brother     Diabetes Sister          Review of Systems   Constitutional: Negative for chills, fever and weight loss. HENT: Negative for nosebleeds. Eyes: Negative for blurred vision and double vision. Respiratory: Negative for cough, shortness of breath and wheezing. Cardiovascular: Negative for chest pain, palpitations, orthopnea, claudication, leg swelling and PND. Gastrointestinal: Negative for abdominal pain, heartburn, nausea and vomiting. Genitourinary: Negative for dysuria and hematuria. Musculoskeletal: Negative for falls and myalgias. Skin: Negative for rash. Neurological: Negative for dizziness, focal weakness and headaches. Endo/Heme/Allergies: Does not bruise/bleed easily. Psychiatric/Behavioral: Negative for substance abuse. Visit Vitals  BP (!) 140/80 (BP 1 Location: Left upper arm, BP Patient Position: Sitting, BP Cuff Size: Small adult)   Pulse 97   Ht 5' 3\" (1.6 m)   Wt 72.1 kg (159 lb)   LMP  (LMP Unknown)   SpO2 96%   BMI 28.17 kg/m²       Physical Exam   Constitutional: She is oriented to person, place, and time. She appears well-developed and well-nourished. HENT:   Head: Normocephalic and atraumatic. Eyes: Conjunctivae are normal.   Neck: Neck supple. No JVD present. Carotid bruit is not present. Cardiovascular: Normal rate, regular rhythm, S1 normal, S2 normal and normal pulses. No extrasystoles are present.  Exam reveals no gallop and no distant heart sounds. No murmur heard. Pulmonary/Chest: Effort normal. She has no decreased breath sounds. She has no wheezes. She has no rhonchi. She has no rales. Abdominal: Soft. Bowel sounds are normal. There is no abdominal tenderness. Musculoskeletal:         General: No edema. Neurological: She is alert and oriented to person, place, and time. Skin: Skin is warm and dry. EKG: Normal sinus rhythm, first-degree AV block, borderline low voltage in the limb leads, normal axis, normal QTc interval, no ST-T wave changes concerning for ischemia. No significant change compared to the previous EKG. ASSESSMENT and PLAN    Paroxysmal atrial fibrillation. This was documented on both Holter monitor and an echocardiogram in May/June 2019. Her longest episode lasted for 2 hours for which she was symptomatic. Her heart rates were about 140 bpm.  Her metoprolol XL dose is now 25 mg daily. She did not wish to be started on anticoagulation at that time. She has had no recurrent symptoms over the past 2 years to suggest recurrent atrial fibrillation. Coronary artery disease. Status post three-vessel bypass surgery in October 2015 using a LIMA to LAD, SVG to OM, and SVG to distal RCA. She last underwent a pharmacological nuclear stress test in September 2020 which is a low risk study. She remains on aspirin, a potent statin and beta-blocker, all of which I would continue. No new symptoms concerning for angina. Essential hypertension. Patient blood pressure appears reasonable on her current medical regimen, which I would continue. Dyslipidemia. Patient is taking rosuvastatin 10 mg daily. This is followed by her PCP. Her LDL should be less than 70 from a cardiac standpoint. Follow-up in 6 months, sooner if needed.

## 2021-06-30 NOTE — PROGRESS NOTES
Yfn De La Garza presents today for evaluation of complaints of chest discomfort. She was seen by Dr. Aldona Frankel on 2/22/21. She states that she experienced a sharp, jabbing chest pain that lasted for only a few seconds one day last week. It occurred at rest and has not had any episodes with activity. She has not had any further episodes. She also complains of some fatigue. She has not seen her PCP lately as she retired but she is scheduled to see one of the other physician's in the group later this month. She is an 79year old female with history of CAD (s/p inferolateral MI in Oct 2015 and s/p CABG x 3, later that month). Her bypasses include a LIMA to LAD, SVG to OM and SVG to distal RCA. She also has history of breast cancer (s/p left partial mastectomy), GERD, hypercholesterolemia, hypertension, and anxiety. She last underwent noninvasive cardiac imaging in April 2018 including a pharmacologic nuclear stress test and an echocardiogram her stress test was negative for ischemia or infarction, EF 64%. Patient underwent a repeat echocardiogram in April 2019 which showed a low normal left ventricular ejection fraction of 51 to 55% with mild concentric LVH but no valvular heart disease. She then wore a Holter monitor and a 30-day event monitor in May and June 2019 to evaluate her heart palpitations. She was found to have several episodes of atrial fibrillation with heart rates up to 140 bpm, longest episode lasted for 2 hours. She underwent a pharmacologic nuclear stress test in September 2020 which was a normal and low risk study. EF 63%, no evidence of ischemia or infarction. Denies chest pain, tightness, heaviness, and palpitations. Denies shortness of breath at rest, dyspnea on exertion, orthopnea and PND. Denies abdominal bloating. Denies lightheadedness, dizziness, and syncope. Denies lower extremity edema and claudication. Denies nausea, vomiting, diarrhea, melena, hematochezia. Denies hematuria, urgency, frequency. Denies fever, chills. PMH:  Past Medical History:   Diagnosis Date    Abnormal finding on EKG 3/26/2014    s/o inf wall mi asymptomatic     Anxiety     Ataxic gait 10/21/2010    Breast cancer ([facility] 05/2014    left side    CAD (coronary artery disease) 10/2015    LM normal, LAD mid 70%, OM1 diffuse 80%, RCA dominant with distal 99% on cardiac catheterization    Cardiac nuclear imaging test 11/29/2016    Low risk. Likely inferior basal prior injury. No ischemia. EF 65%. Mild inferobasal hypk.   Neg EKG on pharm stress test.    Diverticulosis     FHx: breast cancer     GERD (gastroesophageal reflux disease)     Herpes     Hypercholesterolemia     Hypertension     Insomnia     Osteopenia     Osteoporosis screening 2008    WNL per patient, GYN orders    PAF (paroxysmal atrial fibrillation) ([facility] 06/07/2019    Diagnosed on 30-day event monitor    Pap smear 8/2010    GYN, normal    Pre-operative cardiovascular examination 3/26/2014    for shoulder surgery     Rheumatoid arthritis ([facility]     S/P CABG x 3 10/2015    LIMA to LAD, SVG to OM, SVG to distal RCA    S/P partial mastectomy, left, with sentinel lymph node biopsy, 5/11     Screening mammogram 6/2010    GYN orders       PSH:  Past Surgical History:   Procedure Laterality Date    COLONOSCOPY  09/2008    HX BACK SURGERY  1980    HX BREAST BIOPSY      right (2000), left (2011)    HX HYSTERECTOMY  10/2009    complete, fibroid    HX MASTECTOMY  6./8/11    partial left with sentinel lymph node biopsy    HX MASTECTOMY Left 9/10/2014    LEFT PARTIAL MASTECTOMY performed by Zhen iWlson MD at [facility] MAIN OR    HX [facility]      left lower leg surgery at age 11    HX ORTHOPAEDIC Right 7/2014    ROTATOR CUFF    MA CARDIAC SURG PROCEDURE UNLIST  10/2015    triple bypass    MA LAP, INCISIONAL HERNIA REPAIR,REDUCIBLE N/A 01/19/2017    Dr. Antonina Rodriguez       MEDS:  Current Outpatient Medications Medication Sig    rosuvastatin (CRESTOR) 10 mg tablet TAKE ONE TABLET BY MOUTH ONCE NIGHTLY    metoprolol tartrate (LOPRESSOR) 25 mg tablet Take  by mouth daily.  cholecalciferol (VITAMIN D3) 1,000 unit cap Take  by mouth daily.  BABY ASPIRIN PO Take 81 mg by mouth daily.  QUEtiapine (SEROQUEL) 25 mg tablet Take 25 mg by mouth nightly.  MULTIVITAMINS (MULTIVITAMIN PO) Take  by mouth daily. No current facility-administered medications for this visit. Allergies and Sensitivities:  Allergies   Allergen Reactions    Latex Unable to Obtain     To tape with latex    Norco [Hydrocodone-Acetaminophen] Swelling     Patient experienced throat swelling, hoarseness and difficulty swallowing.  Adhesive Hives    Codeine Nausea Only     Pt states she is not allergic. Family History:  Family History   Problem Relation Age of Onset   [facility] Cancer Mother 48        Breast   [facility] Breast Cancer Mother 48    Heart Disease Father     Cancer Sister 76        Breast    Breast Cancer Sister 76    Heart Disease Brother     Diabetes Brother     Diabetes Sister        Social History:  She  reports that she has never smoked. She has never used smokeless tobacco.  She  reports no history of alcohol use. Physical:  Visit Vitals  /80 (BP 1 Location: Left upper arm, BP Patient Position: Sitting, BP Cuff Size: Adult)   Pulse 73   Ht 5' 3\" (1.6 m)   Wt 70.8 kg (156 lb)   SpO2 97%   BMI 27.63 kg/m²         Exam:  Neck:  Supple, no JVD, no carotid bruits  CV:  Normal S1 and  S2, no murmurs, rubs, or gallops noted  Lungs:  Clear to ausculation throughout, no wheezes or rales  Abd:  Soft, non-tender, non-distended with good bowel sounds.   No hepatosplenomegaly  Extremities:  No edema      Data:  EKG:      LABS:  Lab Results   Component Value Date/Time    Sodium 143 11/11/2019 09:53 AM    Potassium 4.2 11/11/2019 09:53 AM    Chloride 108 11/11/2019 09:53 AM    CO2 31 11/11/2019 09:53 AM    Glucose 96 11/11/2019 09:53 AM    BUN 20 (H) 11/11/2019 09:53 AM    Creatinine 1.18 11/11/2019 09:53 AM     Lab Results   Component Value Date/Time    Cholesterol, total 243 (H) 09/09/2010 07:00 AM    HDL Cholesterol 46 09/09/2010 07:00 AM    LDL, calculated 160.2 (H) 09/09/2010 07:00 AM    Triglyceride 184 (H) 09/09/2010 07:00 AM    CHOL/HDL Ratio 5.3 (H) 09/09/2010 07:00 AM     Lab Results   Component Value Date/Time    ALT (SGPT) 40 06/01/2011 11:13 AM         Impression/Plan:  1. Chest pain, likely musculoskeletal  2. CAD, history of inferolateral MI, 3-vessel disease s/p CABG x 3 in 2015, negative pharm nuclear stress test in Sept. 2020  3. Essential hypertension, blood pressure controlled  4. Hypercholesterolemia, on rosuvastatin 10mg     Ms. Arevalo was seen today for evaluation of complaints of chest pain that she described as sharp, jabbing that occurred once last week and only lasted a few seconds. It did not radiate nor was it accompanied by shortness of breath, nausea, or diaphoresis. It occurred at rest and she has not experienced any chest pain with activity. She also complains of some fatigue. Her blood pressure is controlled, breath sounds are clear and she does not exhibit any signs of volume overload. Her chest pain sounds musculoskeletal in nature. We discussed possibly doing another stress test but she does not want to have one done as one was done about 7 months ago and it was negative. As for her fatigue, she was given a lab slip for a TSH to be done. She is due to have labs done for her new PCP on 7/19/21. I asked that she just have it done on the same day to avoid an extra venipuncture. If she has more chest pain or if she has chest pressure, tightness, shortness of breath, nausea, diaphoresis, she is to go to the ER for further evaluation. She will follow-up with Dr. Juan Ramon Yang as scheduled and as needed.       Isaac FARAH, FNP-BC    Please note:  Portions of this chart were created with Dragon medical speech to text program.  Unrecognized errors may be present.

## 2021-07-06 ENCOUNTER — OFFICE VISIT (OUTPATIENT)
Dept: CARDIOLOGY CLINIC | Age: 81
End: 2021-07-06
Payer: MEDICARE

## 2021-07-06 VITALS
SYSTOLIC BLOOD PRESSURE: 118 MMHG | BODY MASS INDEX: 27.64 KG/M2 | HEIGHT: 63 IN | HEART RATE: 73 BPM | OXYGEN SATURATION: 97 % | WEIGHT: 156 LBS | DIASTOLIC BLOOD PRESSURE: 80 MMHG

## 2021-07-06 DIAGNOSIS — R53.83 FATIGUE, UNSPECIFIED TYPE: ICD-10-CM

## 2021-07-06 DIAGNOSIS — R07.89 CHEST DISCOMFORT: Primary | ICD-10-CM

## 2021-07-06 DIAGNOSIS — I25.119 CORONARY ARTERY DISEASE INVOLVING NATIVE CORONARY ARTERY OF NATIVE HEART WITH ANGINA PECTORIS (HCC): ICD-10-CM

## 2021-07-06 DIAGNOSIS — R07.9 CHEST PAIN, UNSPECIFIED TYPE: ICD-10-CM

## 2021-07-06 DIAGNOSIS — I48.0 PAF (PAROXYSMAL ATRIAL FIBRILLATION) (HCC): ICD-10-CM

## 2021-07-06 DIAGNOSIS — I10 ESSENTIAL HYPERTENSION: ICD-10-CM

## 2021-07-06 PROCEDURE — G8427 DOCREV CUR MEDS BY ELIG CLIN: HCPCS | Performed by: NURSE PRACTITIONER

## 2021-07-06 PROCEDURE — 99213 OFFICE O/P EST LOW 20 MIN: CPT | Performed by: NURSE PRACTITIONER

## 2021-07-06 PROCEDURE — 1090F PRES/ABSN URINE INCON ASSESS: CPT | Performed by: NURSE PRACTITIONER

## 2021-07-06 PROCEDURE — 93000 ELECTROCARDIOGRAM COMPLETE: CPT | Performed by: NURSE PRACTITIONER

## 2021-07-06 PROCEDURE — G8399 PT W/DXA RESULTS DOCUMENT: HCPCS | Performed by: NURSE PRACTITIONER

## 2021-07-06 PROCEDURE — G8432 DEP SCR NOT DOC, RNG: HCPCS | Performed by: NURSE PRACTITIONER

## 2021-07-06 PROCEDURE — G8754 DIAS BP LESS 90: HCPCS | Performed by: NURSE PRACTITIONER

## 2021-07-06 PROCEDURE — G8752 SYS BP LESS 140: HCPCS | Performed by: NURSE PRACTITIONER

## 2021-07-06 PROCEDURE — G8419 CALC BMI OUT NRM PARAM NOF/U: HCPCS | Performed by: NURSE PRACTITIONER

## 2021-07-06 PROCEDURE — 1101F PT FALLS ASSESS-DOCD LE1/YR: CPT | Performed by: NURSE PRACTITIONER

## 2021-07-06 PROCEDURE — G8536 NO DOC ELDER MAL SCRN: HCPCS | Performed by: NURSE PRACTITIONER

## 2021-07-06 NOTE — PROGRESS NOTES
Ghanshyam Perez presents today for   Chief Complaint   Patient presents with    Follow-up     7 month f/u; chest discomfort       Ghanshyam Perez preferred language for health care discussion is english/other. Is someone accompanying this pt? no    Is the patient using any DME equipment during [address]? no    Depression Screening:  3 most recent PHQ Screens 7/6/2021   PHQ Not Done -   Little interest or pleasure in doing things Not at all   Feeling down, depressed, irritable, or hopeless Not at all   Total Score PHQ 2 0       Learning Assessment:  Learning Assessment 2/22/2021   PRIMARY LEARNER Patient   HIGHEST LEVEL OF EDUCATION - PRIMARY LEARNER  -   BARRIERS PRIMARY LEARNER -   PRIMARY LANGUAGE ENGLISH   LEARNER PREFERENCE PRIMARY DEMONSTRATION     -     -     -   ANSWERED BY patient   RELATIONSHIP SELF       Abuse Screening:  Abuse Screening Questionnaire 7/6/2021   Do you ever feel afraid of your partner? N   Are you in a relationship with someone who physically or mentally threatens you? N   Is it safe for you to go home? Y       Fall Risk  Fall Risk Assessment, last 12 mths 7/6/2021   Able to walk? Yes   Fall in past 12 months? 0   Do you feel unsteady? 0   Are you worried about falling 0   Number of falls in past 12 months -   Fall with injury? -       Pt currently taking Anticoagulant therapy? ASA 81mg    Coordination of Care:  1. Have you been to the ER, urgent care clinic since your last visit? Hospitalized since your last visit? no    2. Have you seen or consulted any other health care providers outside of the [address] [place] [address] since your last visit? Include any pap smears or colon screening.  no

## 2021-11-08 ENCOUNTER — OFFICE VISIT (OUTPATIENT)
Dept: CARDIOLOGY CLINIC | Age: 81
End: 2021-11-08
Payer: MEDICARE

## 2021-11-08 VITALS
HEIGHT: 63 IN | BODY MASS INDEX: 28.17 KG/M2 | SYSTOLIC BLOOD PRESSURE: 136 MMHG | WEIGHT: 159 LBS | HEART RATE: 71 BPM | OXYGEN SATURATION: 97 % | DIASTOLIC BLOOD PRESSURE: 72 MMHG

## 2021-11-08 DIAGNOSIS — Z95.1 S/P CABG X 3: ICD-10-CM

## 2021-11-08 DIAGNOSIS — I48.0 PAF (PAROXYSMAL ATRIAL FIBRILLATION) (HCC): ICD-10-CM

## 2021-11-08 DIAGNOSIS — E78.00 HYPERCHOLESTEREMIA: ICD-10-CM

## 2021-11-08 DIAGNOSIS — I25.119 CORONARY ARTERY DISEASE INVOLVING NATIVE CORONARY ARTERY OF NATIVE HEART WITH ANGINA PECTORIS (HCC): Primary | ICD-10-CM

## 2021-11-08 DIAGNOSIS — I10 PRIMARY HYPERTENSION: ICD-10-CM

## 2021-11-08 PROCEDURE — 99214 OFFICE O/P EST MOD 30 MIN: CPT | Performed by: INTERNAL MEDICINE

## 2021-11-08 PROCEDURE — 1101F PT FALLS ASSESS-DOCD LE1/YR: CPT | Performed by: INTERNAL MEDICINE

## 2021-11-08 PROCEDURE — 93000 ELECTROCARDIOGRAM COMPLETE: CPT | Performed by: INTERNAL MEDICINE

## 2021-11-08 PROCEDURE — G8536 NO DOC ELDER MAL SCRN: HCPCS | Performed by: INTERNAL MEDICINE

## 2021-11-08 PROCEDURE — G8419 CALC BMI OUT NRM PARAM NOF/U: HCPCS | Performed by: INTERNAL MEDICINE

## 2021-11-08 PROCEDURE — G8427 DOCREV CUR MEDS BY ELIG CLIN: HCPCS | Performed by: INTERNAL MEDICINE

## 2021-11-08 PROCEDURE — G8754 DIAS BP LESS 90: HCPCS | Performed by: INTERNAL MEDICINE

## 2021-11-08 PROCEDURE — G8399 PT W/DXA RESULTS DOCUMENT: HCPCS | Performed by: INTERNAL MEDICINE

## 2021-11-08 PROCEDURE — G8510 SCR DEP NEG, NO PLAN REQD: HCPCS | Performed by: INTERNAL MEDICINE

## 2021-11-08 PROCEDURE — 1090F PRES/ABSN URINE INCON ASSESS: CPT | Performed by: INTERNAL MEDICINE

## 2021-11-08 PROCEDURE — G8752 SYS BP LESS 140: HCPCS | Performed by: INTERNAL MEDICINE

## 2021-11-08 NOTE — PROGRESS NOTES
HISTORY OF PRESENT ILLNESS  Fidencio Severance is a 79 y.o. female. Follow-up  Pertinent negatives include no chest pain, no abdominal pain, no headaches and no shortness of breath. Patient presents for a follow-up office visit. She was previously followed by Dr. Tayler Francois. She has a past medical history significant for coronary disease with a prior inferolateral myocardial infarction, found to have three-vessel coronary disease on cardiac catheterization in October 2015, subsequently undergoing three-vessel bypass surgery later that month using a LIMA to LAD, SVG to OM and SVG to distal RCA at [facility]. Patient last underwent noninvasive cardiac imaging in April 2018 including a pharmacologic nuclear stress test and an echocardiogram her stress test was negative for ischemia or infarction, EF 64%. Patient underwent a repeat echocardiogram in April 2019 which showed a low normal left ventricular ejection fraction of 51 to 55% with mild concentric LVH but no valvular heart disease. She then wore a Holter monitor and a 30-day event monitor in May and June 2019 to evaluate her heart palpitations. She was found to have several episodes of atrial fibrillation with heart rates up to 140 bpm, longest episode lasted for 2 hours. Patient reports that she was symptomatic with the episodes. Patient last underwent a pharmacologic nuclear stress test in September 2020 which was a normal and low risk study. EF 63%, no evidence of ischemia or infarction. She was last seen in our office approximately 8-9 months ago. Since last visit, she continues to feel well. She has not noted any significant change in her activity tolerance. She did have an episode of chest pain in July of this year which was felt to be musculoskeletal in nature. There has been no recurrence since that time. No new shortness of breath, no heart palpitations, dizziness, nor syncope. No leg swelling or claudication.     Past Medical History:   Diagnosis Date    Abnormal finding on EKG 3/26/2014    s/o inf wall mi asymptomatic     Anxiety     Ataxic gait 10/21/2010    Breast cancer ([facility] 05/2014    left side    CAD (coronary artery disease) 10/2015    LM normal, LAD mid 70%, OM1 diffuse 80%, RCA dominant with distal 99% on cardiac catheterization    Cardiac nuclear imaging test 11/29/2016    Low risk. Likely inferior basal prior injury. No ischemia. EF 65%. Mild inferobasal hypk. Neg EKG on pharm stress test.   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  cholecalciferol (VITAMIN D3) 1,000 unit cap Take  by mouth daily.  BABY ASPIRIN PO Take 81 mg by mouth daily.  QUEtiapine (SEROQUEL) 25 mg tablet Take 25 mg by mouth nightly.  MULTIVITAMINS (MULTIVITAMIN PO) Take  by mouth daily. Allergies   Allergen Reactions    Latex Unable to Obtain     To tape with latex    Norco [Hydrocodone-Acetaminophen] Swelling     Patient experienced throat swelling, hoarseness and difficulty swallowing.  Adhesive Hives    Codeine Nausea Only     Pt states she is not allergic.       Social History     Tobacco Use    Smoking status: Never Smoker    Smokeless tobacco: Never Used    Tobacco comment: quit age 26   Substance Use Topics    Alcohol use: No    Drug use: No     Family History   Problem Relation Age of Onset   [facility] Mother 48        Breast   [facility] Cancer Mother 48    Heart Disease Father     Cancer Sister 76        Breast    Breast Cancer Sister 76    Heart Disease Brother     Diabetes Brother     Diabetes Sister          Review of Systems   Constitutional: Negative for chills, fever and weight loss. HENT: Negative for nosebleeds. Eyes: Negative for blurred vision and double vision. Respiratory: Negative for cough, shortness of breath and wheezing. Cardiovascular: Negative for chest pain, palpitations, orthopnea, claudication, leg swelling and PND. Gastrointestinal: Negative for abdominal pain, heartburn, nausea and vomiting. Genitourinary: Negative for dysuria and hematuria. Musculoskeletal: Negative for falls and myalgias. Skin: Negative for rash. Neurological: Negative for dizziness, focal weakness and headaches. Endo/Heme/Allergies: Does not bruise/bleed easily. Psychiatric/Behavioral: Negative for substance abuse. Visit Vitals  /72 (BP 1 Location: Left upper arm, BP Patient Position: Sitting, BP Cuff Size: Adult)   Pulse 71   Ht 5' 3\" (1.6 m)   Wt 72.1 kg (159 lb)   LMP  (LMP Unknown)   SpO2 97%   BMI 28.17 kg/m²       Physical Exam  Constitutional:       Appearance: She is well-developed. HENT:      Head: Normocephalic and atraumatic. Eyes:      Conjunctiva/sclera: Conjunctivae normal.   Neck:      Vascular: No carotid bruit or JVD. Cardiovascular:      Rate and Rhythm: Normal rate and regular rhythm. No extrasystoles are present. Pulses: Normal pulses. Heart sounds: S1 normal and S2 normal. Heart sounds not distant. No murmur heard. No gallop. Pulmonary:      Effort: Pulmonary effort is normal.      Breath sounds:  No decreased breath sounds, wheezing, rhonchi or rales. Abdominal:      General: Bowel sounds are normal.      Palpations: Abdomen is soft. Tenderness: There is no abdominal tenderness. Musculoskeletal:         General: No swelling or deformity. Cervical back: Neck supple. Skin:     General: Skin is warm and dry. Neurological:      General: No focal deficit present. Mental Status: She is alert and oriented to person, place, and time. EKG: Normal sinus rhythm, borderline low voltage in the limb leads, normal axis, normal QTc interval, no ST-T wave changes concerning for ischemia. Compared to the previous EKG, no significant change. ASSESSMENT and PLAN  Coronary artery disease. Status post three-vessel bypass surgery in October 2015 using a LIMA to LAD, SVG to OM, and SVG to distal RCA. She last underwent a pharmacological nuclear stress test in September 2020 which is a low risk study. She remains on aspirin, a potent statin and a low-dose beta-blocker, all of which I would continue. No new symptoms concerning for angina. Paroxysmal atrial fibrillation. This was documented on both Holter monitor and an echocardiogram in May/June 2019. Her longest episode lasted for 2 hours for which she was symptomatic. Her heart rates were about 140 bpm.  Her metoprolol XL dose is now 25 mg daily. She did not wish to be started on anticoagulation at that time. She has had no recurrent symptoms over the past 2+ years to suggest recurrent atrial fibrillation. Essential hypertension. Patient blood pressure remains well controlled on her current medical regimen, all of which I would continue. Dyslipidemia. Patient is taking rosuvastatin 10 mg daily. This is followed by her PCP. Her LDL should be less than 70 from a cardiac standpoint. Follow-up in 6 months, sooner if needed.

## 2021-11-08 NOTE — PROGRESS NOTES
Geraldine Murray presents today for   Chief Complaint   Patient presents with    Follow-up     6 month follow up- no complaints        Geraldine Murray preferred language for health care discussion is english/other. Is someone accompanying this pt? no    Is the patient using any DME equipment during [address]? no    Depression Screening:  3 most recent PHQ Screens 11/8/2021   PHQ Not Done -   Little interest or pleasure in doing things Not at all   Feeling down, depressed, irritable, or hopeless Not at all   Total Score PHQ 2 0       Learning Assessment:  Learning Assessment 2/22/2021   PRIMARY LEARNER Patient   HIGHEST LEVEL OF EDUCATION - PRIMARY LEARNER  -   BARRIERS PRIMARY LEARNER -   PRIMARY LANGUAGE ENGLISH   LEARNER PREFERENCE PRIMARY DEMONSTRATION     -     -     -   ANSWERED BY patient   RELATIONSHIP SELF       Abuse Screening:  Abuse Screening Questionnaire 11/8/2021   Do you ever feel afraid of your partner? N   Are you in a relationship with someone who physically or mentally threatens you? N   Is it safe for you to go home? Y       Fall Risk  Fall Risk Assessment, last 12 mths 11/8/2021   Able to walk? Yes   Fall in past 12 months? 0   Do you feel unsteady? 0   Are you worried about falling 0   Number of falls in past 12 months -   Fall with injury? -       Pt currently taking Anticoagulant therapy? no    Coordination of Care:  1. Have you been to the ER, urgent care clinic since your last visit? Hospitalized since your last visit? no    2. Have you seen or consulted any other health care providers outside of the [address] [place] [address] since your last visit? Include any pap smears or colon screening.  no

## 2022-01-25 RX ORDER — ROSUVASTATIN CALCIUM 10 MG/1
TABLET, COATED ORAL
Qty: 30 TABLET | Refills: 6 | Status: SHIPPED | OUTPATIENT
Start: 2022-01-25 | End: 2022-09-01

## 2022-02-25 ENCOUNTER — TRANSCRIBE ORDER (OUTPATIENT)
Dept: SCHEDULING | Age: 82
End: 2022-02-25

## 2022-02-25 DIAGNOSIS — Z00.00 ENCOUNTER FOR MEDICARE ANNUAL WELLNESS EXAM: Primary | ICD-10-CM

## 2022-02-25 DIAGNOSIS — Z00.00 MEDICARE ANNUAL WELLNESS VISIT, INITIAL: Primary | ICD-10-CM

## 2022-03-19 PROBLEM — I48.0 PAF (PAROXYSMAL ATRIAL FIBRILLATION) (HCC): Status: ACTIVE | Noted: 2019-08-15

## 2022-03-20 PROBLEM — Z95.1 S/P CABG X 3: Status: ACTIVE | Noted: 2018-04-12

## 2022-08-17 ENCOUNTER — TRANSCRIBE ORDER (OUTPATIENT)
Dept: SCHEDULING | Age: 82
End: 2022-08-17

## 2022-08-17 DIAGNOSIS — Z00.00 ENCOUNTER FOR MEDICARE ANNUAL WELLNESS EXAM: Primary | ICD-10-CM

## 2022-08-17 DIAGNOSIS — Z00.00 ENCOUNTER FOR MEDICARE ANNUAL WELLNESS EXAM: ICD-10-CM

## 2022-08-17 DIAGNOSIS — Z00.00 ENCOUNTER FOR LIMITED MEDICAL EXAMINATION: Primary | ICD-10-CM

## 2022-10-04 RX ORDER — ROSUVASTATIN CALCIUM 10 MG/1
TABLET, COATED ORAL
Qty: 30 TABLET | Refills: 0 | Status: SHIPPED | OUTPATIENT
Start: 2022-10-04 | End: 2022-11-03

## 2022-12-02 ENCOUNTER — OFFICE VISIT (OUTPATIENT)
Dept: CARDIOLOGY CLINIC | Age: 82
End: 2022-12-02
Payer: MEDICARE

## 2022-12-02 VITALS
HEIGHT: 63 IN | BODY MASS INDEX: 27.29 KG/M2 | DIASTOLIC BLOOD PRESSURE: 76 MMHG | HEART RATE: 77 BPM | SYSTOLIC BLOOD PRESSURE: 136 MMHG | WEIGHT: 154 LBS | OXYGEN SATURATION: 97 %

## 2022-12-02 DIAGNOSIS — I25.10 CORONARY ARTERY DISEASE INVOLVING NATIVE CORONARY ARTERY OF NATIVE HEART WITHOUT ANGINA PECTORIS: Primary | ICD-10-CM

## 2022-12-02 DIAGNOSIS — E78.00 HYPERCHOLESTEREMIA: ICD-10-CM

## 2022-12-02 DIAGNOSIS — Z95.1 S/P CABG X 3: ICD-10-CM

## 2022-12-02 DIAGNOSIS — I48.0 PAF (PAROXYSMAL ATRIAL FIBRILLATION) (HCC): ICD-10-CM

## 2022-12-02 DIAGNOSIS — I10 PRIMARY HYPERTENSION: ICD-10-CM

## 2022-12-02 NOTE — PROGRESS NOTES
Veena Israel presents today for   Chief Complaint   Patient presents with    Follow-up     Overdue Follow Up : Last Seen on 11-       Veena Israel preferred language for health care discussion is english/other. Is someone accompanying this pt? no    Is the patient using any DME equipment during [address]? no    Depression Screening:  3 most recent PHQ Screens 12/2/2022   PHQ Not Done -   Little interest or pleasure in doing things Not at all   Feeling down, depressed, irritable, or hopeless Not at all   Total Score PHQ 2 0       Learning Assessment:  Learning Assessment 12/2/2022   PRIMARY LEARNER Patient   HIGHEST LEVEL OF EDUCATION - PRIMARY LEARNER  -   BARRIERS PRIMARY LEARNER -   PRIMARY LANGUAGE ENGLISH   LEARNER PREFERENCE PRIMARY DEMONSTRATION     -     -     -   ANSWERED BY patient   RELATIONSHIP SELF       Abuse Screening:  Abuse Screening Questionnaire 12/2/2022   Do you ever feel afraid of your partner? N   Are you in a relationship with someone who physically or mentally threatens you? N   Is it safe for you to go home? Y       Fall Risk  Fall Risk Assessment, last 12 mths 12/2/2022   Able to walk? Yes   Fall in past 12 months? 0   Do you feel unsteady? 0   Are you worried about falling 0   Number of falls in past 12 months -   Fall with injury? -           Pt currently taking Anticoagulant therapy? no    Pt currently taking Antiplatelet therapy ? ASA 81 mg once a day      Coordination of Care:  1. Have you been to the ER, urgent care clinic since your last visit? Hospitalized since your last visit? no    2. Have you seen or consulted any other health care providers outside of the [address] [place] [address] since your last visit? Include any pap smears or colon screening.  no

## 2022-12-02 NOTE — PROGRESS NOTES
HISTORY OF PRESENT ILLNESS  Arnoldo Rodriguez is a 79 y.o. female. Follow-up  Pertinent negatives include no chest pain, no abdominal pain, no headaches and no shortness of breath. Patient presents for a follow-up office visit. She was previously followed by Dr. Ryan Lyons. She has a past medical history significant for coronary disease with a prior inferolateral myocardial infarction, found to have three-vessel coronary disease on cardiac catheterization in October 2015, subsequently undergoing three-vessel bypass surgery later that month using a LIMA to LAD, SVG to OM and SVG to distal RCA at [facility]. Patient underwent a repeat echocardiogram in April 2019 which showed a low normal left ventricular ejection fraction of 51-55% with mild concentric LVH but no valvular heart disease. She then wore a Holter monitor and a 30-day event monitor in June 2019 to evaluate her heart palpitations. She was found to have several episodes of atrial fibrillation with heart rates up to 140 bpm, longest episode lasted for 2 hours. Patient reports that she was symptomatic with the episodes. Patient last underwent a pharmacologic nuclear stress test in September 2020 which was a normal and low risk study. EF 63%, no evidence of ischemia or infarction. She was last seen in our office approximately 13 months ago. Since last visit, she continues to feel well. She has not noted any significant change in her activity tolerance. She denies any chest pain or prolonged heart palpitations. She complains of occasional dizzy spells where she feels like her balance is off but no azar syncope or near syncope. She is trying to walk most days of the week and has not noted any major change in her activity tolerance. No new exertional symptoms.     Past Medical History:   Diagnosis Date    Abnormal finding on EKG 3/26/2014    s/o inf wall mi asymptomatic     Anxiety     Ataxic gait 10/21/2010    Breast cancer ([facility] [address] 05/2014 left side    CAD (coronary artery disease) 10/2015    LM normal, LAD mid 70%, OM1 diffuse 80%, RCA dominant with distal 99% on cardiac catheterization    Cardiac nuclear imaging test 11/29/2016    Low risk. Likely inferior basal prior injury. No ischemia. EF 65%. Mild inferobasal hypk. Neg EKG on pharm stress test.    Diverticulosis     FHx: breast cancer     GERD (gastroesophageal reflux disease)     Herpes     Hypercholesterolemia     Hypertension     Insomnia     Osteopenia     Osteoporosis screening 2008    WNL per patient, GYN orders    PAF (paroxysmal atrial fibrillation) ([facility] [address] 06/07/2019    Diagnosed on 30-day event monitor    Pap smear 8/2010    GYN, normal    Pre-operative cardiovascular examination 3/26/2014    for shoulder surgery     Rheumatoid arthritis (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 cholecalciferol (VITAMIN D3) 25 mcg (1,000 unit) cap Take 1,000 Units by mouth daily. BABY ASPIRIN PO Take 81 mg by mouth daily. QUEtiapine (SEROQUEL) 25 mg tablet Take 25 mg by mouth nightly. MULTIVITAMINS (MULTIVITAMIN PO) Take 1 Tablet by mouth daily. Allergies   Allergen Reactions    Latex Unable to Obtain     To tape with latex    Norco [Hydrocodone-Acetaminophen] Swelling     Patient experienced throat swelling, hoarseness and difficulty swallowing. Adhesive Hives    Codeine Nausea Only     Pt states she is not allergic.       Social History     Tobacco Use    Smoking status: Never    Smokeless tobacco: Never    Tobacco comments:     quit age 26   Vaping Use    Vaping Use: Never used   Substance Use Topics    Alcohol use: No    Drug use: No     Family History   Problem Relation Age of Onset    Cancer Mother 48        Breast    Breast Cancer Mother 48    Heart Disease Father     Cancer Sister 76        Breast    Breast Cancer Sister 76    Heart Disease Brother     Diabetes Brother     Diabetes Sister          Review of Systems   Constitutional:  Negative for chills, fever and weight loss. HENT:  Negative for nosebleeds. Eyes:  Negative for blurred vision and double vision. Respiratory:  Negative for cough, shortness of breath and wheezing. Cardiovascular:  Negative for chest pain, palpitations, orthopnea, claudication, leg swelling and PND. Gastrointestinal:  Negative for abdominal pain, heartburn, nausea and vomiting. Genitourinary:  Negative for dysuria and hematuria. Musculoskeletal:  Negative for falls and myalgias. Skin:  Negative for rash. Neurological:  Negative for dizziness, focal weakness and headaches. Endo/Heme/Allergies:  Does not bruise/bleed easily. Psychiatric/Behavioral:  Negative for substance abuse. Visit Vitals  /76 (BP 1 Location: Left upper arm, BP Patient Position: Sitting, BP Cuff Size: Small adult)   Pulse 77   Ht 5' 3\" (1.6 m)   Wt 69.9 kg (154 lb)   LMP  (LMP Unknown)   SpO2 97%   BMI 27.28 kg/m²       Physical Exam  Constitutional:       Appearance: She is well-developed. HENT:      Head: Normocephalic and atraumatic. Eyes:      Conjunctiva/sclera: Conjunctivae normal.   Neck:      Vascular: No carotid bruit or JVD. Cardiovascular:      Rate and Rhythm: Normal rate and regular rhythm. No extrasystoles are present. Pulses: Normal pulses. Heart sounds: S1 normal and S2 normal. Heart sounds not distant. No murmur heard. No gallop. Pulmonary:      Effort: Pulmonary effort is normal.      Breath sounds: No decreased breath sounds, wheezing, rhonchi or rales.    Abdominal:      General: Bowel sounds are normal.      Palpations: Abdomen is soft.      Tenderness: There is no abdominal tenderness. Musculoskeletal:         General: No swelling or deformity. Cervical back: Neck supple. Skin:     General: Skin is warm and dry. Neurological:      General: No focal deficit present. Mental Status: She is alert and oriented to person, place, and time. EKG: Normal sinus rhythm, borderline low voltage in the limb leads, normal axis, normal QTc interval, no ST-T wave changes concerning for ischemia. Compared to the previous EKG, no significant change. ASSESSMENT and PLAN  Coronary artery disease. Status post three-vessel bypass surgery in October 2015 using a LIMA to LAD, SVG to OM, and SVG to distal RCA. She last underwent a pharmacological nuclear stress test in September 2020 which is a low risk study. She remains on aspirin, a potent statin and a low-dose beta-blocker, all of which I would continue. I have recommended repeating a pharmacologic nuclear stress test prior to her next office visit. Paroxysmal atrial fibrillation. This was documented on both Holter monitor and an echocardiogram in May/June 2019. Her longest episode lasted for 2 hours for which she was symptomatic. Her heart rates were about 140 bpm.  She has had no recurrent symptoms suggestive of atrial fibrillation. No need to start on oral anticoagulation. I would simply continue a low-dose beta-blocker and her aspirin. Essential hypertension. Patient blood pressure remains well controlled on her current medical regimen, all of which I would continue. Dyslipidemia. Patient is taking rosuvastatin 10 mg daily. This is followed by her PCP. Her LDL should be less than 70 from a cardiac standpoint. Follow-up in 6 months, sooner if needed.

## 2023-01-30 DIAGNOSIS — Z00.00 ENCOUNTER FOR MEDICARE ANNUAL WELLNESS EXAM: Primary | ICD-10-CM

## 2023-01-30 DIAGNOSIS — Z00.00 MEDICARE ANNUAL WELLNESS VISIT, INITIAL: Primary | ICD-10-CM

## 2023-02-01 DIAGNOSIS — Z00.00 MEDICARE ANNUAL WELLNESS VISIT, INITIAL: Primary | ICD-10-CM

## 2023-02-03 DIAGNOSIS — Z00.00 MEDICARE ANNUAL WELLNESS VISIT, INITIAL: Primary | ICD-10-CM

## 2023-05-12 DIAGNOSIS — I25.119 CORONARY ARTERY DISEASE INVOLVING NATIVE CORONARY ARTERY OF NATIVE HEART WITH ANGINA PECTORIS (HCC): Primary | ICD-10-CM

## 2023-06-08 RX ORDER — ROSUVASTATIN CALCIUM 10 MG/1
10 TABLET, COATED ORAL NIGHTLY
Qty: 90 TABLET | Refills: 3 | Status: SHIPPED | OUTPATIENT
Start: 2023-06-08

## 2023-06-09 ENCOUNTER — OFFICE VISIT (OUTPATIENT)
Age: 83
End: 2023-06-09
Payer: MEDICARE

## 2023-06-09 VITALS
DIASTOLIC BLOOD PRESSURE: 68 MMHG | HEIGHT: 63 IN | WEIGHT: 153 LBS | HEART RATE: 73 BPM | BODY MASS INDEX: 27.11 KG/M2 | OXYGEN SATURATION: 96 % | SYSTOLIC BLOOD PRESSURE: 112 MMHG

## 2023-06-09 DIAGNOSIS — Z95.1 S/P CABG X 3: ICD-10-CM

## 2023-06-09 DIAGNOSIS — I48.0 PAROXYSMAL ATRIAL FIBRILLATION (HCC): ICD-10-CM

## 2023-06-09 DIAGNOSIS — E78.00 PURE HYPERCHOLESTEROLEMIA, UNSPECIFIED: ICD-10-CM

## 2023-06-09 DIAGNOSIS — Z95.1 PRESENCE OF AORTOCORONARY BYPASS GRAFT: ICD-10-CM

## 2023-06-09 DIAGNOSIS — I25.10 ATHEROSCLEROSIS OF NATIVE CORONARY ARTERY OF NATIVE HEART WITHOUT ANGINA PECTORIS: Primary | ICD-10-CM

## 2023-06-09 PROBLEM — K43.2 INCISIONAL HERNIA: Status: ACTIVE | Noted: 2017-01-19

## 2023-06-09 PROBLEM — S22.41XA CLOSED FRACTURE OF MULTIPLE RIBS OF RIGHT SIDE: Status: ACTIVE | Noted: 2023-03-28

## 2023-06-09 PROCEDURE — 1123F ACP DISCUSS/DSCN MKR DOCD: CPT | Performed by: INTERNAL MEDICINE

## 2023-06-09 PROCEDURE — 99214 OFFICE O/P EST MOD 30 MIN: CPT | Performed by: INTERNAL MEDICINE

## 2023-06-09 PROCEDURE — G8427 DOCREV CUR MEDS BY ELIG CLIN: HCPCS | Performed by: INTERNAL MEDICINE

## 2023-06-09 PROCEDURE — 3074F SYST BP LT 130 MM HG: CPT | Performed by: INTERNAL MEDICINE

## 2023-06-09 PROCEDURE — G8419 CALC BMI OUT NRM PARAM NOF/U: HCPCS | Performed by: INTERNAL MEDICINE

## 2023-06-09 PROCEDURE — 3078F DIAST BP <80 MM HG: CPT | Performed by: INTERNAL MEDICINE

## 2023-06-09 PROCEDURE — 1036F TOBACCO NON-USER: CPT | Performed by: INTERNAL MEDICINE

## 2023-06-09 PROCEDURE — G8399 PT W/DXA RESULTS DOCUMENT: HCPCS | Performed by: INTERNAL MEDICINE

## 2023-06-09 PROCEDURE — 1090F PRES/ABSN URINE INCON ASSESS: CPT | Performed by: INTERNAL MEDICINE

## 2023-06-09 PROCEDURE — 93000 ELECTROCARDIOGRAM COMPLETE: CPT | Performed by: INTERNAL MEDICINE

## 2023-06-09 RX ORDER — ASPIRIN 81 MG/1
81 TABLET ORAL DAILY
COMMUNITY

## 2023-06-09 ASSESSMENT — PATIENT HEALTH QUESTIONNAIRE - PHQ9
1. LITTLE INTEREST OR PLEASURE IN DOING THINGS: 0
SUM OF ALL RESPONSES TO PHQ QUESTIONS 1-9: 0
SUM OF ALL RESPONSES TO PHQ QUESTIONS 1-9: 0
2. FEELING DOWN, DEPRESSED OR HOPELESS: 0
SUM OF ALL RESPONSES TO PHQ QUESTIONS 1-9: 0
SUM OF ALL RESPONSES TO PHQ9 QUESTIONS 1 & 2: 0
SUM OF ALL RESPONSES TO PHQ QUESTIONS 1-9: 0

## 2023-06-09 ASSESSMENT — ANXIETY QUESTIONNAIRES
4. TROUBLE RELAXING: 0
7. FEELING AFRAID AS IF SOMETHING AWFUL MIGHT HAPPEN: 0
1. FEELING NERVOUS, ANXIOUS, OR ON EDGE: 0
2. NOT BEING ABLE TO STOP OR CONTROL WORRYING: 0
6. BECOMING EASILY ANNOYED OR IRRITABLE: 0
GAD7 TOTAL SCORE: 0
5. BEING SO RESTLESS THAT IT IS HARD TO SIT STILL: 0
3. WORRYING TOO MUCH ABOUT DIFFERENT THINGS: 0

## 2023-06-09 ASSESSMENT — ENCOUNTER SYMPTOMS
SHORTNESS OF BREATH: 0
VOMITING: 0
ABDOMINAL PAIN: 0
COUGH: 0
ABDOMINAL DISTENTION: 0
SORE THROAT: 0
NAUSEA: 0

## 2023-06-09 NOTE — PROGRESS NOTES
Javier Busch presents today for   Chief Complaint   Patient presents with    Follow-up     6 month follow up       Javier Busch preferred language for health care discussion is english/other. Is someone accompanying this pt? no    Is the patient using any DME equipment during OV? no    Depression Screening:  Depression: Not at risk    PHQ-2 Score: 0        Learning Assessment:  Who is the primary learner? Patient    What is the preferred language for health care of the primary learner? ENGLISH    How does the primary learner prefer to learn new concepts? DEMONSTRATION    Answered By patient    Relationship to Learner SELF           Pt currently taking Anticoagulant therapy? no    Pt currently taking Antiplatelet therapy ? ASA 81 mg once a day      Coordination of Care:  1. Have you been to the ER, urgent care clinic since your last visit? Hospitalized since your last visit? yes    2. Have you seen or consulted any other health care providers outside of the [address] [place] [address] since your last visit? Include any pap smears or colon screening.  no
Tenderness: There is no abdominal tenderness. Musculoskeletal:         General: No swelling or deformity. Skin:     General: Skin is warm and dry. Findings: No rash. Neurological:      General: No focal deficit present. Mental Status: She is alert and oriented to person, place, and time. Psychiatric:         Mood and Affect: Mood normal.         Behavior: Behavior normal.       EKG: Normal sinus rhythm, normal axis, normal QTc interval, no ST or T wave changes concerning for ischemia. Borderline low voltage in the limb leads. No change compared to the previous EKG. Assessment / Plan:   Coronary artery disease. Status post three-vessel bypass surgery in October 2015 using a LIMA to LAD, SVG to OM, and SVG to distal RCA. She last underwent a pharmacological nuclear stress test in September 2020 which is a low risk study. She remains on aspirin, a potent statin and a low-dose beta-blocker. I have recommended repeating a pharmacologic nuclear stress later this summer for further ischemic evaluation. Of also recommended stopping her metoprolol for 2 weeks to see if this will improve her fatigue. Paroxysmal atrial fibrillation. This was documented on both Holter monitor and an echocardiogram in May/June 2019. Her longest episode lasted for 2 hours for which she was symptomatic. Her heart rates were about 140 bpm.  She has had no recurrent symptoms suggestive of atrial fibrillation. No need to start on oral anticoagulation. .    Dyslipidemia. Patient is taking rosuvastatin 10 mg daily. This is followed by her PCP. Her LDL should be less than 70 from a cardiac standpoint. Follow-up in 6 months, sooner if needed.         Mila Youssef MD

## 2023-07-18 ENCOUNTER — TELEPHONE (OUTPATIENT)
Age: 83
End: 2023-07-18

## 2023-07-18 NOTE — TELEPHONE ENCOUNTER
.Patient made aware of stress test results and Dr. Evy Hartman remarks.  No questions or concerns at present

## 2023-07-18 NOTE — TELEPHONE ENCOUNTER
----- Message from Marie Pretty MD sent at 7/17/2023  3:04 PM EDT -----  Please let the patient know that her nuclear stress test was normal and low risk.  ----- Message -----  From: Wolfgang Matson LPN  Sent: 9/13/4705   2:18 PM EDT  To: Marie Pretty MD    Per your note-Coronary artery disease. Status post three-vessel bypass surgery in October 2015 using a LIMA to LAD, SVG to OM, and SVG to distal RCA. She last underwent a pharmacological nuclear stress test in September 2020 which is a low risk study. She remains on aspirin, a potent statin and a low-dose beta-blocker. I have recommended repeating a pharmacologic nuclear stress later this summer for further ischemic evaluation.   Of also recommended stopping her metoprolol for 2 weeks to see if this will improve her fatigue

## 2023-08-07 ENCOUNTER — OFFICE VISIT (OUTPATIENT)
Age: 83
End: 2023-08-07
Payer: MEDICARE

## 2023-08-07 VITALS
HEART RATE: 74 BPM | TEMPERATURE: 97.1 F | WEIGHT: 152.13 LBS | OXYGEN SATURATION: 90 % | SYSTOLIC BLOOD PRESSURE: 141 MMHG | DIASTOLIC BLOOD PRESSURE: 70 MMHG | HEIGHT: 62 IN | BODY MASS INDEX: 27.99 KG/M2 | RESPIRATION RATE: 18 BRPM

## 2023-08-07 DIAGNOSIS — I48.0 PAF (PAROXYSMAL ATRIAL FIBRILLATION) (HCC): ICD-10-CM

## 2023-08-07 DIAGNOSIS — Z91.81 AT HIGH RISK FOR FALLS: ICD-10-CM

## 2023-08-07 DIAGNOSIS — C50.919 MALIGNANT NEOPLASM OF FEMALE BREAST, UNSPECIFIED ESTROGEN RECEPTOR STATUS, UNSPECIFIED LATERALITY, UNSPECIFIED SITE OF BREAST (HCC): ICD-10-CM

## 2023-08-07 DIAGNOSIS — E04.1 THYROID NODULE: Primary | ICD-10-CM

## 2023-08-07 DIAGNOSIS — M79.2 NEUROPATHIC PAIN: ICD-10-CM

## 2023-08-07 DIAGNOSIS — I25.119 CORONARY ARTERY DISEASE INVOLVING NATIVE CORONARY ARTERY OF NATIVE HEART WITH ANGINA PECTORIS (HCC): ICD-10-CM

## 2023-08-07 DIAGNOSIS — K59.09 CHRONIC CONSTIPATION: ICD-10-CM

## 2023-08-07 PROCEDURE — 1090F PRES/ABSN URINE INCON ASSESS: CPT | Performed by: INTERNAL MEDICINE

## 2023-08-07 PROCEDURE — 99204 OFFICE O/P NEW MOD 45 MIN: CPT | Performed by: INTERNAL MEDICINE

## 2023-08-07 PROCEDURE — G8399 PT W/DXA RESULTS DOCUMENT: HCPCS | Performed by: INTERNAL MEDICINE

## 2023-08-07 PROCEDURE — G8427 DOCREV CUR MEDS BY ELIG CLIN: HCPCS | Performed by: INTERNAL MEDICINE

## 2023-08-07 PROCEDURE — 3078F DIAST BP <80 MM HG: CPT | Performed by: INTERNAL MEDICINE

## 2023-08-07 PROCEDURE — 1036F TOBACCO NON-USER: CPT | Performed by: INTERNAL MEDICINE

## 2023-08-07 PROCEDURE — G8419 CALC BMI OUT NRM PARAM NOF/U: HCPCS | Performed by: INTERNAL MEDICINE

## 2023-08-07 PROCEDURE — 1123F ACP DISCUSS/DSCN MKR DOCD: CPT | Performed by: INTERNAL MEDICINE

## 2023-08-07 PROCEDURE — 3074F SYST BP LT 130 MM HG: CPT | Performed by: INTERNAL MEDICINE

## 2023-08-07 PROCEDURE — 99211 OFF/OP EST MAY X REQ PHY/QHP: CPT | Performed by: INTERNAL MEDICINE

## 2023-08-07 RX ORDER — BISACODYL 5 MG/1
5 TABLET, DELAYED RELEASE ORAL DAILY PRN
Qty: 30 TABLET | Refills: 5 | Status: SHIPPED | OUTPATIENT
Start: 2023-08-07

## 2023-08-07 RX ORDER — DULOXETIN HYDROCHLORIDE 30 MG/1
30 CAPSULE, DELAYED RELEASE ORAL DAILY
Qty: 30 CAPSULE | Refills: 3 | Status: SHIPPED | OUTPATIENT
Start: 2023-08-07

## 2023-08-07 RX ORDER — DOCUSATE SODIUM 100 MG/1
100 CAPSULE, LIQUID FILLED ORAL 2 TIMES DAILY PRN
Qty: 60 CAPSULE | Refills: 5 | Status: SHIPPED | OUTPATIENT
Start: 2023-08-07 | End: 2024-02-03

## 2023-08-07 SDOH — ECONOMIC STABILITY: FOOD INSECURITY: WITHIN THE PAST 12 MONTHS, THE FOOD YOU BOUGHT JUST DIDN'T LAST AND YOU DIDN'T HAVE MONEY TO GET MORE.: NEVER TRUE

## 2023-08-07 SDOH — ECONOMIC STABILITY: INCOME INSECURITY: HOW HARD IS IT FOR YOU TO PAY FOR THE VERY BASICS LIKE FOOD, HOUSING, MEDICAL CARE, AND HEATING?: NOT HARD AT ALL

## 2023-08-07 SDOH — ECONOMIC STABILITY: FOOD INSECURITY: WITHIN THE PAST 12 MONTHS, YOU WORRIED THAT YOUR FOOD WOULD RUN OUT BEFORE YOU GOT MONEY TO BUY MORE.: NEVER TRUE

## 2023-08-07 SDOH — ECONOMIC STABILITY: HOUSING INSECURITY
IN THE LAST 12 MONTHS, WAS THERE A TIME WHEN YOU DID NOT HAVE A STEADY PLACE TO SLEEP OR SLEPT IN A SHELTER (INCLUDING NOW)?: NO

## 2023-08-07 ASSESSMENT — PATIENT HEALTH QUESTIONNAIRE - PHQ9
SUM OF ALL RESPONSES TO PHQ QUESTIONS 1-9: 0
SUM OF ALL RESPONSES TO PHQ QUESTIONS 1-9: 0
1. LITTLE INTEREST OR PLEASURE IN DOING THINGS: 0
2. FEELING DOWN, DEPRESSED OR HOPELESS: 0
SUM OF ALL RESPONSES TO PHQ QUESTIONS 1-9: 0
SUM OF ALL RESPONSES TO PHQ9 QUESTIONS 1 & 2: 0
SUM OF ALL RESPONSES TO PHQ QUESTIONS 1-9: 0

## 2023-08-07 ASSESSMENT — LIFESTYLE VARIABLES
HOW OFTEN DO YOU HAVE A DRINK CONTAINING ALCOHOL: NEVER
HOW MANY STANDARD DRINKS CONTAINING ALCOHOL DO YOU HAVE ON A TYPICAL DAY: PATIENT DOES NOT DRINK

## 2023-08-07 NOTE — PROGRESS NOTES
[facility] OF [facility]:  8/13/2023, MRN: [id number]      Yu Stanford is a 79 y.o. female and presents to clinic to [address] [place] [address] and Medicare AWV      Subjective: The pt is an 78yo female with a h/o HTN, AF, CAD, fracture, HLD, vitamin D deficiency, breast cancer, HLD, rib fractures, diverticulosis (per imaging), cervical facet arthropathy, cervical disc disease, anxiety,thyroid nodule, hearing loss, thyroid nodule hx, right rotator cuff tendinopathy. 1. AF/CAD: No CP. +CABG. No palpitations or SOB. She is taking rosuvastatin 10 mg nightly, 81 mg daily of aspirin, and metoprolol 25 mg daily. 2. Left Breast Cancer: S/p lumpectomy. No XRT or chemotherapy. S/p recent mammogram.      5/9/23 Mammogram:  Stable appearance of the RIGHT and LEFT breast. No evidence of malignancy. BI-RADS CATEGORY: BI-RADS Category 2:  Benign. FOLLOW UP RECOMMENDATIONS: Routine Screening Mammogram     3. Health Maintenance:  - She has not had an eye exam in yrs. No eye issues. She declines getting a formal eye exam.   - She has occasional neck and lower back pain. +Ataxia. She goes to a pool for exercises. No hematochezia, melena, hematuria, or vaginal bleeding history. 3. Chronic Constipation: She tries to increase her green vegetable intake. She stays hydrated with water. No ETOH. No drug use. Prune juice helps to relieve her constipation. 4.  Thyroid nodule history: An incidental finding on imaging. 3/28/23 Cervical Spine CT: No evidence of acute fracture or traumatic malalignment. Mild to moderate degenerative changes as described. Multinodular thyroid. Largest thyroid measures 2 cm left inferior thyroid pole. Consider nonemergent outpatient ultrasonography for further characterization if not previously performed. 3/28/23 Chest/Abdomen/Pelvis CT: Moderate sized soft tissue contusion right posterior lateral flank with multiple associated rib fractures and small right-sided basilar pulmonary contusion.

## 2023-08-09 ENCOUNTER — TELEPHONE (OUTPATIENT)
Age: 83
End: 2023-08-09

## 2023-08-09 DIAGNOSIS — M79.2 NEUROPATHIC PAIN: Primary | ICD-10-CM

## 2023-08-09 NOTE — TELEPHONE ENCOUNTER
Patient is staling the Duloxetine is not agreeing with her. It's causing her to have nausea. Please advise.

## 2023-08-10 RX ORDER — GABAPENTIN 100 MG/1
100 CAPSULE ORAL 3 TIMES DAILY
Qty: 90 CAPSULE | Refills: 5 | Status: SHIPPED | OUTPATIENT
Start: 2023-08-10 | End: 2024-02-06

## 2023-08-10 NOTE — TELEPHONE ENCOUNTER
Followed up with Mrs Dorothy House regarding symptoms. She states the Cymbalta is giving her nausea.  The sensitivity has been added to allergy list . Patient was informed that Dr Ewelina Stroud has made Gabapentin 100 mg 3 times a day available at  her preferred pharmacy HIPAA verified no further concerns at this time

## 2023-08-10 NOTE — TELEPHONE ENCOUNTER
Please have her stop taking Cymbalta. Please clarify what sx she is having on this medication. Please update her allergy list to include Cymbalta will be side effects she is experiencing. Meanwhile, I am ordering gabapentin low-dose-100 mg 3 times daily to be taken for nerve related pain.     Dr. Judy Ash  [facility] of [address] [place] [address]  Phone: [phone]  Fax: [phone]

## 2023-08-12 ENCOUNTER — HOSPITAL ENCOUNTER (OUTPATIENT)
Facility: HOSPITAL | Age: 83
Discharge: HOME | End: 2023-08-12
Attending: INTERNAL MEDICINE
Payer: MEDICARE

## 2023-08-12 DIAGNOSIS — E04.1 THYROID NODULE: ICD-10-CM

## 2023-08-12 PROCEDURE — 76536 US EXAM OF HEAD AND NECK: CPT

## 2023-08-13 PROBLEM — E04.1 THYROID NODULE: Status: ACTIVE | Noted: 2023-08-13

## 2023-08-13 ASSESSMENT — ENCOUNTER SYMPTOMS
EYE PAIN: 0
SORE THROAT: 0
BLOOD IN STOOL: 0
COUGH: 0
ANAL BLEEDING: 0
CONSTIPATION: 1
SHORTNESS OF BREATH: 0
ABDOMINAL PAIN: 0
BACK PAIN: 1

## 2023-08-14 ENCOUNTER — TELEPHONE (OUTPATIENT)
Age: 83
End: 2023-08-14

## 2023-08-14 NOTE — TELEPHONE ENCOUNTER
Followed up with patient regarding ultrasound results.  She was made aware that ultrasound is still in process and we'll contact her as soon as they have resulted HIPAA verified no further concerns stated at this time

## 2023-08-14 NOTE — TELEPHONE ENCOUNTER
Patient calling to check on the results of an [address] she just had done last Monday. Please advise.

## 2023-08-16 DIAGNOSIS — E04.1 THYROID NODULE: Primary | ICD-10-CM

## 2023-09-18 ENCOUNTER — HOSPITAL ENCOUNTER (OUTPATIENT)
Facility: HOSPITAL | Age: 83
Setting detail: SPECIMEN
Discharge: HOME | End: 2023-09-21
Payer: MEDICARE

## 2023-09-18 ENCOUNTER — TELEPHONE (OUTPATIENT)
Age: 83
End: 2023-09-18

## 2023-09-18 DIAGNOSIS — I25.119 CORONARY ARTERY DISEASE INVOLVING NATIVE CORONARY ARTERY OF NATIVE HEART WITH ANGINA PECTORIS (HCC): ICD-10-CM

## 2023-09-18 DIAGNOSIS — E04.1 THYROID NODULE: ICD-10-CM

## 2023-09-18 LAB
ALBUMIN SERPL-MCNC: 3.8 G/DL (ref 3.4–5)
ALBUMIN/GLOB SERPL: 1.1 (ref 0.8–1.7)
ALP SERPL-CCNC: 74 U/L (ref 45–117)
ALT SERPL-CCNC: 23 U/L (ref 13–56)
ANION GAP SERPL CALC-SCNC: 3 MMOL/L (ref 3–18)
AST SERPL-CCNC: 22 U/L (ref 10–38)
BILIRUB SERPL-MCNC: 0.6 MG/DL (ref 0.2–1)
BUN SERPL-MCNC: 21 MG/DL (ref 7–18)
BUN/CREAT SERPL: 21 (ref 12–20)
CALCIUM SERPL-MCNC: 9.8 MG/DL (ref 8.5–10.1)
CHLORIDE SERPL-SCNC: 108 MMOL/L (ref 100–111)
CHOLEST SERPL-MCNC: 163 MG/DL
CO2 SERPL-SCNC: 29 MMOL/L (ref 21–32)
CREAT SERPL-MCNC: 0.99 MG/DL (ref 0.6–1.3)
GLOBULIN SER CALC-MCNC: 3.4 G/DL (ref 2–4)
GLUCOSE SERPL-MCNC: 101 MG/DL (ref 74–99)
HDLC SERPL-MCNC: 45 MG/DL (ref 40–60)
HDLC SERPL: 3.6 (ref 0–5)
LDLC SERPL CALC-MCNC: 60.8 MG/DL (ref 0–100)
LIPID PANEL: ABNORMAL
POTASSIUM SERPL-SCNC: 4.4 MMOL/L (ref 3.5–5.5)
PROT SERPL-MCNC: 7.2 G/DL (ref 6.4–8.2)
SODIUM SERPL-SCNC: 140 MMOL/L (ref 136–145)
T4 FREE SERPL-MCNC: 1.2 NG/DL (ref 0.7–1.5)
TRIGL SERPL-MCNC: 286 MG/DL
TSH SERPL DL<=0.05 MIU/L-ACNC: 1.61 UIU/ML (ref 0.36–3.74)
VLDLC SERPL CALC-MCNC: 57.2 MG/DL

## 2023-09-18 PROCEDURE — 84443 ASSAY THYROID STIM HORMONE: CPT

## 2023-09-18 PROCEDURE — 84439 ASSAY OF FREE THYROXINE: CPT

## 2023-09-18 PROCEDURE — 80061 LIPID PANEL: CPT

## 2023-09-18 PROCEDURE — 36415 COLL VENOUS BLD VENIPUNCTURE: CPT

## 2023-09-18 PROCEDURE — 80053 COMPREHEN METABOLIC PANEL: CPT

## 2023-09-18 NOTE — TELEPHONE ENCOUNTER
Pt called stating she had labs today that were suppose to be fasting , she said she got home and realized she ate a grilled cheese and brownie today before she got her labs done . Please advise dxooes she need to repeat them before her appt on 09/27?

## 2023-09-19 NOTE — PROGRESS NOTES
[facility]  [facility] [place] [address] [place]  [phone] fax  Plan of Care/ Statement of Necessity for Physical Therapy Services     Patient name: Agata Hoffman Start of Care: 2020   Referral source: Mookie Medley MD : 1940    Medical Diagnosis: Pain of right hip [M25.551]  Left hip pain [M25.552]  Payor: [place] MEDICARE / Plan: [place] MEDICARE PART A & B / Product Type: Medicare /  Onset Date:3 months    Treatment Diagnosis: bilateral hip pain   Prior Hospitalization: see medical history Provider#: [id number]   Medications: Verified on Patient summary List    Comorbidities: heart disease, triple bypass 2016, HTN   Prior Level of Function: Functionally independent, lives alone in apartment, member of [employer], enjoys cooking    The Plan of Care and following information is based on the information from the initial evaluation. Assessment/ key information: Patient is a pleasant [de-identified]year old female who presents with complaints of increasing bilateral hip pain that began in the past three months when COVID-19 prevented her from her regular exercises in the pool at the [facility]. Since she has not been able to workout, she reports increased hip pain and difficulty with standing after sitting for 5-10 minutes, as well as pain with standing to cook and increasing LE weakness. Additionally she reports difficulty with getting into and out of her car. At evaluation Patient demonstrates increased difficulty with sit to stand tranfers, performing 5 repetitions in 23 seconds indicating increased instability. She also demonstrates impaired hip strength bilaterally, with tenderness to palpation in the Left glutes. Overall Patient is a good rehab candidate based on premorbid status, and will benefit from skilled physical therapy in order to build LE strength and stability in preparation for return to regular workouts.      Evaluation Complexity History MEDIUM  Complexity : 1-2 comorbidities / personal factors will impact the outcome/ POC ; Examination MEDIUM Complexity : 3 Standardized tests and measures addressing body structure, function, activity limitation and / or participation in recreation  ;Presentation LOW Complexity : Stable, uncomplicated  ;Clinical Decision Making MEDIUM Complexity : FOTO score of 26-74  Overall Complexity Rating: MEDIUM  Problem List: pain affecting function, decrease ROM, decrease strength, edema affecting function, impaired gait/ balance, decrease ADL/ functional abilitiies, decrease activity tolerance, decrease flexibility/ joint mobility and decrease transfer abilities   Treatment Plan may include any combination of the following: Therapeutic exercise, Therapeutic activities, Neuromuscular re-education, Physical agent/modality, Gait/balance training, Manual therapy, Aquatic therapy, Patient education and Self Care training  Patient / Family readiness to learn indicated by: asking questions, trying to perform skills and interest  Persons(s) to be included in education: patient (P)  Barriers to Learning/Limitations: None  Patient Goal (s): build strength in front  Patient Self Reported Health Status: fair  Rehabilitation Potential: good    Short Term Goals: To be accomplished in 1 weeks:  Goal: Patient will be independent and compliant with HEP in order to improve ease of transfers. Status at last note/certification: issued and reviewed  Goal: Patient will initiate aquatic therapy without incident or increased pain in order to progress toward long term goals. Status at last note/certification: n/a  Long Term Goals: To be accomplished in 10 treatments:  Goal: Patient will improve FOTO assessment score to 58 pts in order to indicate improved functional abilities. Status at last note/certification: 38 pts  Goal: Patient will improve bilateral hip abduction strength to 5/5 in order to improve stability with ambulation, stair negotiation.   Status at last note/certification: 4/5 bilaterally  Goal: Patient will be able to perform 5 repetitions of sit to stands in 16 seconds or less in order to improve ease of daily tasks and indicate improved stability. Status at last note/certification: 23 seconds  Goal: Patient will report average bilateral hip pain as 2-3/10 or less in order to progress toward premorbid activity levels. Status at last note/certification: 2/11    Frequency / Duration: Patient to be seen 2 times per week for 10 treatments. Patient/ Caregiver education and instruction: Diagnosis, prognosis, exercises   [x]  Plan of care has been reviewed with PTA    Certification Period: 7/14/20 to 8/12/20  Zhao Precise, PT 7/14/2020 3:34 PM  _____________________________________________________________________  I certify that the above Therapy Services are being furnished while the patient is under my care. I agree with the treatment plan and certify that this therapy is necessary.     Physician's Signature:____________Date:_________TIME:________    ** Signature, Date and Time must be completed for valid certification **    Please sign and return to [facility] NATI RAMIREZ [address]  [phone] fax Clear

## 2023-09-27 ENCOUNTER — OFFICE VISIT (OUTPATIENT)
Age: 83
End: 2023-09-27
Payer: MEDICARE

## 2023-09-27 VITALS
HEART RATE: 75 BPM | BODY MASS INDEX: 27.6 KG/M2 | HEIGHT: 62 IN | OXYGEN SATURATION: 95 % | DIASTOLIC BLOOD PRESSURE: 78 MMHG | RESPIRATION RATE: 20 BRPM | TEMPERATURE: 97.3 F | WEIGHT: 150 LBS | SYSTOLIC BLOOD PRESSURE: 134 MMHG

## 2023-09-27 DIAGNOSIS — E04.1 THYROID NODULE: ICD-10-CM

## 2023-09-27 DIAGNOSIS — Z00.00 MEDICARE ANNUAL WELLNESS VISIT, INITIAL: ICD-10-CM

## 2023-09-27 DIAGNOSIS — M79.2 NEUROPATHIC PAIN: ICD-10-CM

## 2023-09-27 DIAGNOSIS — I25.119 CORONARY ARTERY DISEASE INVOLVING NATIVE CORONARY ARTERY OF NATIVE HEART WITH ANGINA PECTORIS (HCC): ICD-10-CM

## 2023-09-27 DIAGNOSIS — K58.2 IRRITABLE BOWEL SYNDROME WITH BOTH CONSTIPATION AND DIARRHEA: Primary | ICD-10-CM

## 2023-09-27 DIAGNOSIS — E78.00 HYPERCHOLESTEREMIA: ICD-10-CM

## 2023-09-27 PROCEDURE — 1090F PRES/ABSN URINE INCON ASSESS: CPT | Performed by: INTERNAL MEDICINE

## 2023-09-27 PROCEDURE — 3078F DIAST BP <80 MM HG: CPT | Performed by: INTERNAL MEDICINE

## 2023-09-27 PROCEDURE — 99214 OFFICE O/P EST MOD 30 MIN: CPT | Performed by: INTERNAL MEDICINE

## 2023-09-27 PROCEDURE — 3074F SYST BP LT 130 MM HG: CPT | Performed by: INTERNAL MEDICINE

## 2023-09-27 PROCEDURE — G8427 DOCREV CUR MEDS BY ELIG CLIN: HCPCS | Performed by: INTERNAL MEDICINE

## 2023-09-27 PROCEDURE — G0438 PPPS, INITIAL VISIT: HCPCS | Performed by: INTERNAL MEDICINE

## 2023-09-27 PROCEDURE — 1036F TOBACCO NON-USER: CPT | Performed by: INTERNAL MEDICINE

## 2023-09-27 PROCEDURE — G8419 CALC BMI OUT NRM PARAM NOF/U: HCPCS | Performed by: INTERNAL MEDICINE

## 2023-09-27 PROCEDURE — G8399 PT W/DXA RESULTS DOCUMENT: HCPCS | Performed by: INTERNAL MEDICINE

## 2023-09-27 PROCEDURE — 1123F ACP DISCUSS/DSCN MKR DOCD: CPT | Performed by: INTERNAL MEDICINE

## 2023-09-27 ASSESSMENT — PATIENT HEALTH QUESTIONNAIRE - PHQ9
SUM OF ALL RESPONSES TO PHQ QUESTIONS 1-9: 0
SUM OF ALL RESPONSES TO PHQ QUESTIONS 1-9: 0
SUM OF ALL RESPONSES TO PHQ9 QUESTIONS 1 & 2: 0
SUM OF ALL RESPONSES TO PHQ QUESTIONS 1-9: 0
SUM OF ALL RESPONSES TO PHQ QUESTIONS 1-9: 0
2. FEELING DOWN, DEPRESSED OR HOPELESS: 0
1. LITTLE INTEREST OR PLEASURE IN DOING THINGS: 0

## 2023-09-27 NOTE — PROGRESS NOTES
INTERNISTS OF [facility]:  10/3/2023, MRN: [id number]      Yu Stanford is a 79 y.o. female and presents to clinic for Medicare AWV      Subjective: The pt is an 80yo female with a h/o HTN, AF, CAD, fracture, HLD, vitamin D deficiency, left breast cancer status post lumpectomy, HLD, rib fractures, diverticulosis (per imaging), cervical facet arthropathy, cervical disc disease, anxiety, hearing loss, thyroid nodule hx (followed by ENT), right rotator cuff tendinopathy. 1. Thyroid Nodule Hx: At her last apt, I referred her to an ENT doctor. A thyroid ultrasound was also ordered. September labs show a normal set of TFTs. Today she reports: she scheduled with ENT since her last apt. She was told to see them in a year. Thyroid Ultrasound 8/12/23: Heterogeneous multinodular thyroid gland. In the right lobe,-1.0 cm length TR 4-moderately suspicious nodule for which one year follow-up recommended. Additional subcentimeter TR 4 nodules do not necessitate follow-up. In the left lobe, -1.9 cm length TR 3-mildly suspicious nodule for which one year follow-up recommended. Additional subcentimeter TR 4 nodule do not necessitate follow-up. Recommendations based on TI RADS. 2. CAD and HLD: At her last appointment, I ordered lab work to investigate her cholesterol and CMP. She was to continue taking Crestor 10 mg daily, aspirin 81 mg daily, and metoprolol 25 mg daily. Blood pressure today: 134/78. September labs show: a normal creatinine and set of electrolytes. Her total cholesterol is 163. HDL is 45. LDL is 60. Triglycerides are 286. Her LFTs are unremarkable. 3.  Chronic constipation: At her last visit, I encouraged her to take bisacodyl 5 mg daily as needed with docusate 100 mg twice daily as needed and MiraLAX as needed. Today she reports: She continues to have constipation despite use of these laxatives which she took as needed.   After a few days ago, she had an explosive bowel movement that caused

## 2023-10-03 ASSESSMENT — ENCOUNTER SYMPTOMS
COUGH: 0
SORE THROAT: 0
BLOOD IN STOOL: 0
ANAL BLEEDING: 0
EYE PAIN: 0
SHORTNESS OF BREATH: 0
ABDOMINAL PAIN: 0

## 2023-10-04 NOTE — ACP (ADVANCE CARE PLANNING)
Advance Care Planning     General Advance Care Planning (ACP) Conversation    Date of Conversation: 9/27/23    Conducted with: Patient with Decision Making Capacity    Healthcare Decision Maker: Today we discussed Healthcare Decision Makers. The patient is considering options. Content/Action Overview:  Has NO ACP documents/care preferences - information provided, considering goals and options  I encouraged her to complete her advance directive. She was given a blank advance directive to complete today. She is considering her options regarding who she wants to be her POA.     Length of Voluntary ACP Conversation in minutes:  <16 minutes (Non-Billable)    Carolee Puri MD

## 2023-11-20 ENCOUNTER — TELEPHONE (OUTPATIENT)
Age: 83
End: 2023-11-20

## 2023-11-21 RX ORDER — QUETIAPINE FUMARATE 25 MG/1
25 TABLET, FILM COATED ORAL DAILY
Qty: 30 TABLET | Refills: 5 | Status: SHIPPED | OUTPATIENT
Start: 2023-11-21

## 2023-11-21 NOTE — TELEPHONE ENCOUNTER
I will need to discuss why she is taking Seroquel at her next appointment. Ideally, this medication should be weaned off if a therapeutic benefit does not outweigh the risks.      Dr. Anjali Bobby  [facility] of [address] [place] [address]  Phone: [phone]  Fax: [phone]

## 2023-12-04 ENCOUNTER — TELEPHONE (OUTPATIENT)
Age: 83
End: 2023-12-04

## 2023-12-04 NOTE — TELEPHONE ENCOUNTER
For the past 3 weeks patient states her body just hurts and she is unable to move. She says the pain goes away and comes back with a vengeance. She takes tylenol when she eats but it does not help. She is req. To see provider, she is aware no avail appts.      Please advise

## 2023-12-06 NOTE — TELEPHONE ENCOUNTER
Apt scheduled for today.     Dr. Obdulia Caceres  [facility] of [address] [place] [address]  Phone: [phone]  Fax: [phone]

## 2023-12-08 ENCOUNTER — HOSPITAL ENCOUNTER (OUTPATIENT)
Facility: HOSPITAL | Age: 83
Setting detail: SPECIMEN
End: 2023-12-08
Payer: MEDICARE

## 2023-12-08 DIAGNOSIS — R42 DIZZINESS: ICD-10-CM

## 2023-12-08 DIAGNOSIS — M25.50 ARTHRALGIA, UNSPECIFIED JOINT: ICD-10-CM

## 2023-12-08 LAB
ALBUMIN SERPL-MCNC: 3.8 G/DL (ref 3.4–5)
ALBUMIN/GLOB SERPL: 1.3 (ref 0.8–1.7)
ALP SERPL-CCNC: 70 U/L (ref 45–117)
ALT SERPL-CCNC: 20 U/L (ref 13–56)
ANION GAP SERPL CALC-SCNC: 4 MMOL/L (ref 3–18)
AST SERPL-CCNC: 24 U/L (ref 10–38)
BASOPHILS # BLD: 0 K/UL (ref 0–0.1)
BASOPHILS NFR BLD: 1 % (ref 0–2)
BILIRUB SERPL-MCNC: 0.5 MG/DL (ref 0.2–1)
BUN SERPL-MCNC: 34 MG/DL (ref 7–18)
BUN/CREAT SERPL: 27 (ref 12–20)
CALCIUM SERPL-MCNC: 9.6 MG/DL (ref 8.5–10.1)
CHLORIDE SERPL-SCNC: 107 MMOL/L (ref 100–111)
CO2 SERPL-SCNC: 30 MMOL/L (ref 21–32)
CREAT SERPL-MCNC: 1.26 MG/DL (ref 0.6–1.3)
CRP SERPL-MCNC: 0.5 MG/DL (ref 0–0.3)
DIFFERENTIAL METHOD BLD: ABNORMAL
EOSINOPHIL # BLD: 0.4 K/UL (ref 0–0.4)
EOSINOPHIL NFR BLD: 5 % (ref 0–5)
ERYTHROCYTE [DISTWIDTH] IN BLOOD BY AUTOMATED COUNT: 14.7 % (ref 11.6–14.5)
GLOBULIN SER CALC-MCNC: 2.9 G/DL (ref 2–4)
GLUCOSE SERPL-MCNC: 128 MG/DL (ref 74–99)
HCT VFR BLD AUTO: 44.7 % (ref 35–45)
HGB BLD-MCNC: 14.1 G/DL (ref 12–16)
IMM GRANULOCYTES # BLD AUTO: 0 K/UL (ref 0–0.04)
IMM GRANULOCYTES NFR BLD AUTO: 0 % (ref 0–0.5)
LYMPHOCYTES # BLD: 2.7 K/UL (ref 0.9–3.6)
LYMPHOCYTES NFR BLD: 33 % (ref 21–52)
MCH RBC QN AUTO: 29.7 PG (ref 24–34)
MCHC RBC AUTO-ENTMCNC: 31.5 G/DL (ref 31–37)
MCV RBC AUTO: 94.1 FL (ref 78–100)
MONOCYTES # BLD: 0.6 K/UL (ref 0.05–1.2)
MONOCYTES NFR BLD: 8 % (ref 3–10)
NEUTS SEG # BLD: 4.4 K/UL (ref 1.8–8)
NEUTS SEG NFR BLD: 53 % (ref 40–73)
NRBC # BLD: 0 K/UL (ref 0–0.01)
NRBC BLD-RTO: 0 PER 100 WBC
PLATELET # BLD AUTO: 251 K/UL (ref 135–420)
PMV BLD AUTO: 10 FL (ref 9.2–11.8)
POTASSIUM SERPL-SCNC: 4.5 MMOL/L (ref 3.5–5.5)
PROT SERPL-MCNC: 6.7 G/DL (ref 6.4–8.2)
RBC # BLD AUTO: 4.75 M/UL (ref 4.2–5.3)
SODIUM SERPL-SCNC: 141 MMOL/L (ref 136–145)
WBC # BLD AUTO: 8.2 K/UL (ref 4.6–13.2)

## 2023-12-08 PROCEDURE — 86431 RHEUMATOID FACTOR QUANT: CPT

## 2023-12-08 PROCEDURE — 86235 NUCLEAR ANTIGEN ANTIBODY: CPT

## 2023-12-08 PROCEDURE — 80053 COMPREHEN METABOLIC PANEL: CPT

## 2023-12-08 PROCEDURE — 86225 DNA ANTIBODY NATIVE: CPT

## 2023-12-08 PROCEDURE — 86140 C-REACTIVE PROTEIN: CPT

## 2023-12-08 PROCEDURE — 86200 CCP ANTIBODY: CPT

## 2023-12-08 PROCEDURE — 85025 COMPLETE CBC W/AUTO DIFF WBC: CPT

## 2023-12-08 PROCEDURE — 36415 COLL VENOUS BLD VENIPUNCTURE: CPT

## 2023-12-08 PROCEDURE — 83520 IMMUNOASSAY QUANT NOS NONAB: CPT

## 2023-12-11 LAB
CENTROMERE B AB SER-ACNC: <0.2 AI (ref 0–0.9)
CHROMATIN AB SERPL-ACNC: <0.2 AI (ref 0–0.9)
DSDNA AB SER-ACNC: <1 IU/ML (ref 0–9)
ENA JO1 AB SER-ACNC: <0.2 AI (ref 0–0.9)
ENA RNP AB SER-ACNC: <0.2 AI (ref 0–0.9)
ENA SCL70 AB SER-ACNC: <0.2 AI (ref 0–0.9)
ENA SM AB SER-ACNC: <0.2 AI (ref 0–0.9)
ENA SS-A AB SER-ACNC: <0.2 AI (ref 0–0.9)
ENA SS-B AB SER-ACNC: <0.2 AI (ref 0–0.9)
Lab: NORMAL

## 2023-12-15 ENCOUNTER — TELEPHONE (OUTPATIENT)
Age: 83
End: 2023-12-15

## 2023-12-15 NOTE — TELEPHONE ENCOUNTER
----- Message from China Scherer MD sent at 12/10/2023  9:32 PM EST -----  Please let her know that her most recent labs did not show any  findings that would explain her dizziness. She is not anemic.   Her electrolytes are normal.  Liver labs are normal.    Dr. China Schreer  [facility] [address] [place] [address]  Phone: [phone]  Fax: [phone]

## 2023-12-26 ENCOUNTER — TELEPHONE (OUTPATIENT)
Age: 83
End: 2023-12-26

## 2023-12-26 NOTE — TELEPHONE ENCOUNTER
Please refill and send to [facility] [id number] Janis Mariee, [address] [facility] [phone] [[id number]]     metoprolol tartrate (LOPRESSOR) 25 MG tablet

## 2023-12-29 ENCOUNTER — TELEPHONE (OUTPATIENT)
Age: 83
End: 2023-12-29

## 2023-12-29 RX ORDER — METOPROLOL SUCCINATE 25 MG/1
25 TABLET, EXTENDED RELEASE ORAL DAILY
COMMUNITY

## 2023-12-29 RX ORDER — ACETAMINOPHEN 500 MG
1000 TABLET ORAL EVERY 6 HOURS
COMMUNITY
Start: 2023-03-29

## 2023-12-29 RX ORDER — NALOXONE HYDROCHLORIDE 4 MG/.1ML
4 SPRAY NASAL
COMMUNITY
Start: 2023-03-29

## 2023-12-29 RX ORDER — OXYCODONE HYDROCHLORIDE 5 MG/1
5 TABLET ORAL EVERY 6 HOURS PRN
COMMUNITY
Start: 2023-03-29 | End: 2024-02-09

## 2023-12-29 RX ORDER — AMOXICILLIN 250 MG
1 CAPSULE ORAL 2 TIMES DAILY
COMMUNITY
Start: 2023-03-29 | End: 2024-02-09

## 2023-12-29 NOTE — TELEPHONE ENCOUNTER
Pharmacy contacted. Pt will continue taking metoprolol succinate 25mg daily as previously prescribed.    Dr. Caron Aguilar  [facility] [address], [address]  [place] [address]  Phone: [phone]  Fax: [phone]

## 2023-12-29 NOTE — TELEPHONE ENCOUNTER
[facility] [id number] - [place], [place] - [address] - P [phone] - F [phone] [[id number]]  is calling stating patient has never been on metoprolol tartrate (LOPRESSOR) 25 MG tablet  she has always been on metoprolol succinate. They need clarification on what she needs filled. She is currently out of meds and need refilled     Please reach out to pharmacy

## 2024-01-05 ENCOUNTER — TELEPHONE (OUTPATIENT)
Age: 84
End: 2024-01-05

## 2024-01-05 DIAGNOSIS — M25.50 ARTHRALGIA, UNSPECIFIED JOINT: Primary | ICD-10-CM

## 2024-01-05 RX ORDER — PREDNISONE 20 MG/1
40 TABLET ORAL DAILY
Qty: 10 TABLET | Refills: 0 | Status: SHIPPED | OUTPATIENT
Start: 2024-01-05 | End: 2024-01-10

## 2024-01-05 RX ORDER — METOPROLOL SUCCINATE 25 MG/1
25 TABLET, EXTENDED RELEASE ORAL DAILY
Qty: 90 TABLET | Refills: 3 | Status: SHIPPED | OUTPATIENT
Start: 2024-01-05

## 2024-01-05 NOTE — TELEPHONE ENCOUNTER
Patient states that she is in terrible pain all over and the medication that was prescribe she thinks it is lyrica doesn't seem to be helping.  She is asking for something to help.  States she is not hardly able to walk, can not drive or do anything for herself.  Patient uses [facility] on [address].

## 2024-01-05 NOTE — TELEPHONE ENCOUNTER
She is still having pain. She has pain in her shoulders. +Blurry vision. No headaches. Her inflammatory labs were WNL except a mildly elevated CRP. I will have her c/w lyrica. Ordering prednisone 40mg daily x 5 days. She never started celebrex, ordered at her last apt. She will call me on Monday to let me know how well her sx respond to prednisone. It would be unusual to have PMR. I suspect her sx are from OA and fibromyalgia. Will await her response though.      Dr. Caron Aguilar  [facility] [address], [address]  [place] [address]  Phone: [phone]  Fax: [phone]

## 2024-01-08 ENCOUNTER — TELEPHONE (OUTPATIENT)
Age: 84
End: 2024-01-08

## 2024-01-08 NOTE — TELEPHONE ENCOUNTER
Pt have been taking predniSONE (DELTASONE) 20 MG tablet  and calling to give update. She states she has only been able to take 1 tablet in the morning and not at night because it keeps her up, other than that it does work for her.

## 2024-02-09 ENCOUNTER — OFFICE VISIT (OUTPATIENT)
Age: 84
End: 2024-02-09

## 2024-02-09 VITALS
HEIGHT: 62 IN | WEIGHT: 153 LBS | BODY MASS INDEX: 28.16 KG/M2 | OXYGEN SATURATION: 95 % | SYSTOLIC BLOOD PRESSURE: 120 MMHG | HEART RATE: 107 BPM | DIASTOLIC BLOOD PRESSURE: 82 MMHG

## 2024-02-09 DIAGNOSIS — Z95.1 PRESENCE OF AORTOCORONARY BYPASS GRAFT: ICD-10-CM

## 2024-02-09 DIAGNOSIS — I25.10 ATHEROSCLEROSIS OF NATIVE CORONARY ARTERY OF NATIVE HEART WITHOUT ANGINA PECTORIS: ICD-10-CM

## 2024-02-09 DIAGNOSIS — I48.0 PAROXYSMAL ATRIAL FIBRILLATION (HCC): Primary | ICD-10-CM

## 2024-02-09 DIAGNOSIS — Z95.1 S/P CABG X 3: ICD-10-CM

## 2024-02-09 DIAGNOSIS — E78.00 PURE HYPERCHOLESTEROLEMIA, UNSPECIFIED: ICD-10-CM

## 2024-02-09 ASSESSMENT — PATIENT HEALTH QUESTIONNAIRE - PHQ9
SUM OF ALL RESPONSES TO PHQ QUESTIONS 1-9: 0
1. LITTLE INTEREST OR PLEASURE IN DOING THINGS: 0
SUM OF ALL RESPONSES TO PHQ9 QUESTIONS 1 & 2: 0
SUM OF ALL RESPONSES TO PHQ QUESTIONS 1-9: 0
2. FEELING DOWN, DEPRESSED OR HOPELESS: 0

## 2024-02-09 ASSESSMENT — ENCOUNTER SYMPTOMS
SHORTNESS OF BREATH: 0
ABDOMINAL DISTENTION: 0
VOMITING: 0
NAUSEA: 0
ABDOMINAL PAIN: 0
SORE THROAT: 0
COUGH: 0

## 2024-02-09 NOTE — PROGRESS NOTES
Minnie Brasher presents today for   Chief Complaint   Patient presents with    Follow-up     8 month follow up       Minnie Brasher preferred language for health care discussion is english/other.    Is someone accompanying this pt? no    Is the patient using any DME equipment during OV? no    Depression Screening:  Depression: Not at risk (2/9/2024)    PHQ-2     PHQ-2 Score: 0        Learning Assessment:  Who is the primary learner? Patient    What is the preferred language for health care of the primary learner? ENGLISH    How does the primary learner prefer to learn new concepts? DEMONSTRATION    Answered By patient    Relationship to Learner SELF           Pt currently taking Anticoagulant therapy? no    Pt currently taking Antiplatelet therapy ? ASA 81 mg once a day      Coordination of Care:  1. Have you been to the ER, urgent care clinic since your last visit? Hospitalized since your last visit? no    2. Have you seen or consulted any other health care providers outside of the [facility] since your last visit? Include any pap smears or colon screening. no

## 2024-02-09 NOTE — PROGRESS NOTES
02/09/24     Minnie Brasher  is a 83 y.o. female     Chief Complaint   Patient presents with    Follow-up     8 month follow up       HPI  Patient presents for a follow-up office visit.  She was previously followed by Dr. Varma.  She has a past medical history significant for coronary disease with a prior inferolateral myocardial infarction, found to have three-vessel coronary disease on cardiac catheterization in October 2015, subsequently undergoing three-vessel bypass surgery later that month using a LIMA to LAD, SVG to OM and SVG to distal RCA at [facility].     Patient underwent a repeat echocardiogram in April 2019 which showed a low normal left ventricular ejection fraction of 51-55% with mild concentric LVH but no valvular heart disease.  She then wore a Holter monitor and a 30-day event monitor in June 2019 to evaluate her heart palpitations.  She was found to have several episodes of atrial fibrillation with heart rates up to 140 bpm, longest episode lasted for 2 hours.  Patient reports that she was symptomatic with the episodes.      Patient last underwent a pharmacologic nuclear stress test in July 2023 which was a normal and low risk study.  EF greater than 75 %, no evidence of ischemia or infarction.    She was last seen in our office 7 to 8 months ago.  Since last visit, she states she has been feeling relatively well.  She has not noted any worsening shortness of breath, no new chest pain, no heart palpitations, dizzy spells, nor syncope.  No leg swelling or claudication.  No orthopnea or PND.    Past Medical History:   Diagnosis Date    Abnormal finding on EKG 3/26/2014    s/o inf wall mi asymptomatic     Abnormal nuclear cardiac imaging test 11/29/2016    Low risk.  Likely inferior basal prior injury.  No ischemia.  EF 65%.  Mild inferobasal hypk.  Neg EKG on pharm stress test.    Anxiety     Ataxic gait 10/21/2010    Breast cancer (HCC) 05/2014    left side    CAD (coronary artery disease) 10/2015

## 2024-02-13 ENCOUNTER — TELEPHONE (OUTPATIENT)
Age: 84
End: 2024-02-13

## 2024-02-13 NOTE — TELEPHONE ENCOUNTER
Pt called stating that she could not afford Eliquis. I offered her patient assistance and Coumadin as an option. I explained that she would have to come in weekly for testing. Pt verbalized trying the patient assistance for her Eliquis. She also has an upcoming appt with her PCP per pt. She verbalized understanding. Mattie was given pt's information to contact her for pt assistance. Pt was also aware of pt assistance during her appt.

## 2024-02-14 ENCOUNTER — TELEPHONE (OUTPATIENT)
Age: 84
End: 2024-02-14

## 2024-02-14 NOTE — TELEPHONE ENCOUNTER
PATIENT CALLED IN WITH A SHAKY VOICE CAUSING IT TO BE A LITTLE HARD TO UNDERSTAND HER. PATIENT STATES SHE WAS SCHEDULED FOR SURGERY AND THE DOCTOR CHANGED HER MEDS. SHE WOULD LIKE TO  RUN IT BY DR. ADLER AND HAVE THE NURSE CALL HER TO INFORM HER AS TO WHAT SHE SHOULD DO BECAUSE SHE IS HIGHLY CONFUSED. PATIENT STATES THAT THE DRFransisco TOOK HER OFF OF THE predniSONE (DELTASONE) 5 MG tablet WHICH WAS ACTUALLY HELPING HER AND TRIED TO SWITCH HER TO A MEDICATION THAT COST $1,000. MS. TAVAREZ STATES SHE CAN'T EVEN THINK TO AFFORD THAT KIND OF MEDICATION AND WOULD LIKE FOR DR. ADLER TO INFORM HER WHAT SHE THINKS IS BEST. PLEASE ADVISE.     #: [phone]

## 2024-02-14 NOTE — TELEPHONE ENCOUNTER
Advised pt to refer back to Cardiology for an alternative option to prednisone She stated that she also contacted another Cardiologist in concern of her Prednisone but advised to contact her Cardiologist. She is upset that her dr office hasn't sent paperwork to her about her Eliquis yet but just spoke to nurse on yesterday. Advised of timeframe w/mailing.

## 2024-02-19 ENCOUNTER — OFFICE VISIT (OUTPATIENT)
Age: 84
End: 2024-02-19
Payer: MEDICARE

## 2024-02-19 VITALS
DIASTOLIC BLOOD PRESSURE: 72 MMHG | HEART RATE: 88 BPM | WEIGHT: 152 LBS | SYSTOLIC BLOOD PRESSURE: 130 MMHG | BODY MASS INDEX: 27.8 KG/M2 | OXYGEN SATURATION: 96 %

## 2024-02-19 DIAGNOSIS — I48.0 PAROXYSMAL ATRIAL FIBRILLATION (HCC): Primary | ICD-10-CM

## 2024-02-19 PROCEDURE — 1090F PRES/ABSN URINE INCON ASSESS: CPT | Performed by: INTERNAL MEDICINE

## 2024-02-19 PROCEDURE — 3075F SYST BP GE 130 - 139MM HG: CPT | Performed by: INTERNAL MEDICINE

## 2024-02-19 PROCEDURE — G8399 PT W/DXA RESULTS DOCUMENT: HCPCS | Performed by: INTERNAL MEDICINE

## 2024-02-19 PROCEDURE — G8428 CUR MEDS NOT DOCUMENT: HCPCS | Performed by: INTERNAL MEDICINE

## 2024-02-19 PROCEDURE — 3078F DIAST BP <80 MM HG: CPT | Performed by: INTERNAL MEDICINE

## 2024-02-19 PROCEDURE — G8419 CALC BMI OUT NRM PARAM NOF/U: HCPCS | Performed by: INTERNAL MEDICINE

## 2024-02-19 PROCEDURE — 1123F ACP DISCUSS/DSCN MKR DOCD: CPT | Performed by: INTERNAL MEDICINE

## 2024-02-19 PROCEDURE — G8484 FLU IMMUNIZE NO ADMIN: HCPCS | Performed by: INTERNAL MEDICINE

## 2024-02-19 PROCEDURE — 99204 OFFICE O/P NEW MOD 45 MIN: CPT | Performed by: INTERNAL MEDICINE

## 2024-02-19 PROCEDURE — 1036F TOBACCO NON-USER: CPT | Performed by: INTERNAL MEDICINE

## 2024-02-19 RX ORDER — PREDNISONE 5 MG/1
5 TABLET ORAL DAILY
COMMUNITY

## 2024-02-19 NOTE — PROGRESS NOTES
[facility]    Minnie Brasher is 83 y.o. female     Patient is here today for cardiac evaluation  Patient has known history of CAD status post [facility] in 2015 and found to have t severe three-vessel CAD status post LIMA to LAD, SVG to OM and SVG to distal RCA at [facility] in 2015.  Echo in 04/2019 with EF 50-55%.  Event monitor in 2019 showed several atrial fibrillation episode  NST in 2023, low risk.  No evidence of ischemia and infarction    Patient is here to establish care.  She used to be patient of Dr. Cota however patient would like to change the care  She said that recently she has been started on apixaban however she has not been able to afford the medication and she would like to discuss different options.  She is taking aspirin.  She has no significant chest pain or chest tightness concerning for angina.  No palpitation, presyncope or syncope.    Patient last underwent a pharmacologic nuclear stress test in July 2023 which was a normal and low risk study.  EF greater than 75 %, no evidence of ischemia or infarction.  No orthopnea or PND.  Denies any nausea, vomiting, abdominal pain, fever, chills, sputum production. No hematuria or other bleeding complaints    Past Medical History:   Diagnosis Date    Anxiety     Ataxic gait 10/21/2010    Breast cancer ([facility]) 05/2014    left side    CAD (coronary artery disease) 10/2015    Diverticulosis     GERD (gastroesophageal reflux disease)     Herpes     Hypercholesterolemia     Hypertension     Osteopenia     PAF (paroxysmal atrial fibrillation) ([facility]) 06/07/2019    Diagnosed on 30-day event monitor    Rheumatoid arthritis ([facility])     S/P CABG x 3 10/2015    LIMA to LAD, SVG to OM, SVG to distal RCA    S/P partial mastectomy        Review of Systems:  Cardiac symptoms as noted above in HPI. All others negative.    Current Outpatient Medications   Medication Sig    predniSONE (DELTASONE) 5 MG tablet Take

## 2024-02-26 ENCOUNTER — APPOINTMENT (OUTPATIENT)
Facility: HOSPITAL | Age: 84
End: 2024-02-26
Payer: MEDICARE

## 2024-02-26 ENCOUNTER — HOSPITAL ENCOUNTER (EMERGENCY)
Facility: HOSPITAL | Age: 84
Discharge: HOME | End: 2024-02-26
Attending: STUDENT IN AN ORGANIZED HEALTH CARE EDUCATION/TRAINING PROGRAM
Payer: MEDICARE

## 2024-02-26 ENCOUNTER — OFFICE VISIT (OUTPATIENT)
Age: 84
End: 2024-02-26
Payer: MEDICARE

## 2024-02-26 VITALS
SYSTOLIC BLOOD PRESSURE: 134 MMHG | HEART RATE: 80 BPM | TEMPERATURE: 97.9 F | OXYGEN SATURATION: 96 % | BODY MASS INDEX: 27.97 KG/M2 | DIASTOLIC BLOOD PRESSURE: 76 MMHG | RESPIRATION RATE: 19 BRPM | HEIGHT: 62 IN | WEIGHT: 152 LBS

## 2024-02-26 VITALS
BODY MASS INDEX: 27.97 KG/M2 | DIASTOLIC BLOOD PRESSURE: 80 MMHG | HEART RATE: 118 BPM | OXYGEN SATURATION: 96 % | RESPIRATION RATE: 17 BRPM | WEIGHT: 152 LBS | TEMPERATURE: 98 F | SYSTOLIC BLOOD PRESSURE: 120 MMHG | HEIGHT: 62 IN

## 2024-02-26 DIAGNOSIS — R42 LIGHTHEADEDNESS: Primary | ICD-10-CM

## 2024-02-26 DIAGNOSIS — I25.119 CORONARY ARTERY DISEASE INVOLVING NATIVE CORONARY ARTERY OF NATIVE HEART WITH ANGINA PECTORIS (HCC): ICD-10-CM

## 2024-02-26 DIAGNOSIS — M35.3 PMR (POLYMYALGIA RHEUMATICA) (HCC): ICD-10-CM

## 2024-02-26 DIAGNOSIS — I48.0 PAF (PAROXYSMAL ATRIAL FIBRILLATION) (HCC): Primary | ICD-10-CM

## 2024-02-26 LAB
ANION GAP SERPL CALC-SCNC: 7 MMOL/L (ref 3–18)
APPEARANCE UR: CLEAR
BACTERIA URNS QL MICRO: ABNORMAL /HPF
BASOPHILS # BLD: 0 K/UL (ref 0–0.1)
BASOPHILS NFR BLD: 0 % (ref 0–2)
BILIRUB UR QL: NEGATIVE
BUN SERPL-MCNC: 29 MG/DL (ref 7–18)
BUN/CREAT SERPL: 35 (ref 12–20)
CALCIUM SERPL-MCNC: 9.4 MG/DL (ref 8.5–10.1)
CHLORIDE SERPL-SCNC: 107 MMOL/L (ref 100–111)
CO2 SERPL-SCNC: 26 MMOL/L (ref 21–32)
COLOR UR: YELLOW
CREAT SERPL-MCNC: 0.83 MG/DL (ref 0.6–1.3)
DIFFERENTIAL METHOD BLD: ABNORMAL
EKG ATRIAL RATE: 394 BPM
EKG DIAGNOSIS: NORMAL
EKG Q-T INTERVAL: 372 MS
EKG QRS DURATION: 86 MS
EKG QTC CALCULATION (BAZETT): 474 MS
EKG R AXIS: 14 DEGREES
EKG T AXIS: 61 DEGREES
EKG VENTRICULAR RATE: 98 BPM
EOSINOPHIL # BLD: 0.2 K/UL (ref 0–0.4)
EOSINOPHIL NFR BLD: 2 % (ref 0–5)
EPITH CASTS URNS QL MICRO: ABNORMAL /LPF (ref 0–5)
ERYTHROCYTE [DISTWIDTH] IN BLOOD BY AUTOMATED COUNT: 15.9 % (ref 11.6–14.5)
GLUCOSE BLD STRIP.AUTO-MCNC: 128 MG/DL (ref 74–106)
GLUCOSE SERPL-MCNC: 128 MG/DL (ref 74–99)
GLUCOSE UR STRIP.AUTO-MCNC: NEGATIVE MG/DL
HCT VFR BLD AUTO: 48 % (ref 35–45)
HGB BLD-MCNC: 15.6 G/DL (ref 12–16)
HGB UR QL STRIP: NEGATIVE
IMM GRANULOCYTES # BLD AUTO: 0.1 K/UL (ref 0–0.04)
IMM GRANULOCYTES NFR BLD AUTO: 1 % (ref 0–0.5)
KETONES UR QL STRIP.AUTO: NEGATIVE MG/DL
LEUKOCYTE ESTERASE UR QL STRIP.AUTO: ABNORMAL
LYMPHOCYTES # BLD: 2.5 K/UL (ref 0.9–3.6)
LYMPHOCYTES NFR BLD: 27 % (ref 21–52)
MCH RBC QN AUTO: 29.4 PG (ref 24–34)
MCHC RBC AUTO-ENTMCNC: 32.5 G/DL (ref 31–37)
MCV RBC AUTO: 90.4 FL (ref 78–100)
MONOCYTES # BLD: 0.7 K/UL (ref 0.05–1.2)
MONOCYTES NFR BLD: 8 % (ref 3–10)
NEUTS SEG # BLD: 5.6 K/UL (ref 1.8–8)
NEUTS SEG NFR BLD: 62 % (ref 40–73)
NITRITE UR QL STRIP.AUTO: NEGATIVE
NRBC # BLD: 0 K/UL (ref 0–0.01)
NRBC BLD-RTO: 0 PER 100 WBC
NT PRO BNP: 1169 PG/ML (ref 0–1800)
PH UR STRIP: 5.5 (ref 5–8)
PLATELET # BLD AUTO: 242 K/UL (ref 135–420)
PMV BLD AUTO: 8.6 FL (ref 9.2–11.8)
POTASSIUM SERPL-SCNC: 4 MMOL/L (ref 3.5–5.5)
PROT UR STRIP-MCNC: NEGATIVE MG/DL
RBC # BLD AUTO: 5.31 M/UL (ref 4.2–5.3)
RBC #/AREA URNS HPF: ABNORMAL /HPF (ref 0–5)
SERVICE CMNT-IMP: ABNORMAL
SODIUM SERPL-SCNC: 140 MMOL/L (ref 136–145)
SP GR UR REFRACTOMETRY: 1.02 (ref 1–1.03)
TROPONIN I BLD-MCNC: <0.04 NG/ML (ref 0–0.08)
UROBILINOGEN UR QL STRIP.AUTO: 1 EU/DL (ref 0.2–1)
WBC # BLD AUTO: 9 K/UL (ref 4.6–13.2)
WBC URNS QL MICRO: ABNORMAL /HPF (ref 0–4)

## 2024-02-26 PROCEDURE — 82962 GLUCOSE BLOOD TEST: CPT

## 2024-02-26 PROCEDURE — 1036F TOBACCO NON-USER: CPT | Performed by: INTERNAL MEDICINE

## 2024-02-26 PROCEDURE — 93005 ELECTROCARDIOGRAM TRACING: CPT | Performed by: STUDENT IN AN ORGANIZED HEALTH CARE EDUCATION/TRAINING PROGRAM

## 2024-02-26 PROCEDURE — 85025 COMPLETE CBC W/AUTO DIFF WBC: CPT

## 2024-02-26 PROCEDURE — G8427 DOCREV CUR MEDS BY ELIG CLIN: HCPCS | Performed by: INTERNAL MEDICINE

## 2024-02-26 PROCEDURE — G8399 PT W/DXA RESULTS DOCUMENT: HCPCS | Performed by: INTERNAL MEDICINE

## 2024-02-26 PROCEDURE — 3079F DIAST BP 80-89 MM HG: CPT | Performed by: INTERNAL MEDICINE

## 2024-02-26 PROCEDURE — 83880 ASSAY OF NATRIURETIC PEPTIDE: CPT

## 2024-02-26 PROCEDURE — 71045 X-RAY EXAM CHEST 1 VIEW: CPT

## 2024-02-26 PROCEDURE — 1123F ACP DISCUSS/DSCN MKR DOCD: CPT | Performed by: INTERNAL MEDICINE

## 2024-02-26 PROCEDURE — 99285 EMERGENCY DEPT VISIT HI MDM: CPT

## 2024-02-26 PROCEDURE — 3074F SYST BP LT 130 MM HG: CPT | Performed by: INTERNAL MEDICINE

## 2024-02-26 PROCEDURE — 84484 ASSAY OF TROPONIN QUANT: CPT

## 2024-02-26 PROCEDURE — 99215 OFFICE O/P EST HI 40 MIN: CPT | Performed by: INTERNAL MEDICINE

## 2024-02-26 PROCEDURE — G8419 CALC BMI OUT NRM PARAM NOF/U: HCPCS | Performed by: INTERNAL MEDICINE

## 2024-02-26 PROCEDURE — 1090F PRES/ABSN URINE INCON ASSESS: CPT | Performed by: INTERNAL MEDICINE

## 2024-02-26 PROCEDURE — G8484 FLU IMMUNIZE NO ADMIN: HCPCS | Performed by: INTERNAL MEDICINE

## 2024-02-26 PROCEDURE — 81001 URINALYSIS AUTO W/SCOPE: CPT

## 2024-02-26 PROCEDURE — 93010 ELECTROCARDIOGRAM REPORT: CPT | Performed by: INTERNAL MEDICINE

## 2024-02-26 PROCEDURE — 80048 BASIC METABOLIC PNL TOTAL CA: CPT

## 2024-02-26 PROCEDURE — 99211 OFF/OP EST MAY X REQ PHY/QHP: CPT

## 2024-02-26 ASSESSMENT — PAIN - FUNCTIONAL ASSESSMENT
PAIN_FUNCTIONAL_ASSESSMENT: 0-10
PAIN_FUNCTIONAL_ASSESSMENT: 0-10

## 2024-02-26 ASSESSMENT — ENCOUNTER SYMPTOMS
SHORTNESS OF BREATH: 0
NAUSEA: 0
ANAL BLEEDING: 0
BLOOD IN STOOL: 0
DIARRHEA: 0
ABDOMINAL PAIN: 0
SHORTNESS OF BREATH: 0
VOMITING: 0
EYE PAIN: 0
CHEST TIGHTNESS: 0
SORE THROAT: 0
COUGH: 0
ABDOMINAL PAIN: 0

## 2024-02-26 ASSESSMENT — PAIN SCALES - GENERAL
PAINLEVEL_OUTOF10: 0
PAINLEVEL_OUTOF10: 0

## 2024-02-26 NOTE — PROGRESS NOTES
Minnie Brasher presents today for   Chief Complaint   Patient presents with    Follow-up           \"Have you been to the ER, urgent care clinic since your last visit?  Hospitalized since your last visit?\"    NO    “Have you seen or consulted any other health care providers outside of [facility] since your last visit?”    NO

## 2024-02-26 NOTE — ED TRIAGE NOTES
Patient was at the PCP and felt weakness/n/v, was sent by ambulance to ED.   Denies chest pain/SOB/dizziness

## 2024-02-26 NOTE — ED PROVIDER NOTES
EMERGENCY DEPARTMENT HISTORY AND PHYSICAL EXAM      Date: 2/26/2024  Patient Name: Minnie Brasher    History of Presenting Illness     Chief Complaint   Patient presents with    Fatigue       Patient is an 83-year-old female with past medical history of anxiety, CAD, GERD, hypertension, hypercholesterolemia, paroxysmal atrial fibrillation to the emergency department for evaluation of lightheadedness.  Patient states that she woke up with pain.  Felt dizzy and lightheaded.  She called her sister who brought her to her primary care doctor's office to be evaluated.  While in the office she was found to be in A-fib with RVR with a rate in the 120s.  She was then sent here for further evaluation.  Patient states that she is on metoprolol and Eliquis.  She takes the metoprolol at night.  She did take her Eliquis this morning.  Patient is supposed to wear a heart monitor but had some issues setting up          PCP: Caron Aguilar MD    No current facility-administered medications for this encounter.     Current Outpatient Medications   Medication Sig Dispense Refill    predniSONE (DELTASONE) 5 MG tablet Take 1 tablet by mouth daily (Patient not taking: Reported on 2/26/2024)      apixaban (ELIQUIS) 5 MG TABS tablet Take 1 tablet by mouth 2 times daily 42 tablet 0    apixaban (ELIQUIS) 5 MG TABS tablet Take 1 tablet by mouth 2 times daily 180 tablet 3    metoprolol succinate (TOPROL XL) 25 MG extended release tablet Take 1 tablet by mouth daily 90 tablet 3    acetaminophen (TYLENOL) 500 MG tablet Take 2 tablets by mouth every 6 hours (Patient not taking: Reported on 2/26/2024)      rosuvastatin (CRESTOR) 10 MG tablet Take 1 tablet by mouth nightly 90 tablet 3       Past History     Past Medical History:  Past Medical History:   Diagnosis Date    Anxiety     Ataxic gait 10/21/2010    Breast cancer (HCC) 05/2014    left side    CAD (coronary artery disease) 10/2015    Diverticulosis     GERD (gastroesophageal reflux

## 2024-02-26 NOTE — DISCHARGE INSTRUCTIONS
Make follow-up appointments with your primary care doctor and with a cardiologist.  Your workup here in the emergency department today is negative for anything acute.  Return for any new or worsening symptoms.

## 2024-02-26 NOTE — PROGRESS NOTES
[facility]:  2/26/2024, MRN: [id number]      Minnie Brasher is a 83 y.o. female and presents to clinic for Follow-up      Subjective:   The pt is an 82yo female with a h/o HTN, AF, CAD, fracture, HLD, vitamin D deficiency, left breast cancer status post lumpectomy, HLD, rib fractures, diverticulosis (per imaging), cervical facet arthropathy, cervical disc disease, anxiety, hearing loss, thyroid nodule hx (followed by ENT), right rotator cuff tendinopathy.     1. Atrial fibrillation: The patient is receiving treatment with cardiology.  Her blood pressure is 120/80.  She has severe fatigue with exertion.  No chest pain.  She occasionally has palpitations.  Her heart rate today in our office is 118.  She states that she cannot walk or ambulate secondary to feeling extreme fatigue.  She is post to complete a heart monitor study but has had lots of questions as to how to complete the study.  She has a heart monitor with her today in our office, in a bag.  No shortness of breath symptoms.  She is treated with metoprolol 25 mg daily in addition to 5 mg twice daily of Eliquis. Associated sx: nausea.  The patient has a strong family history of cardiac arrhythmias.    2. PMR: Presumed per physical exam findings at previous appointments, her history, and elevated CRP.  Responsive to low-dose prednisone therapy.  Symptoms recur with stopping her prednisone therapy.      Sister: Marisa Heredia ([phone])    Patient Active Problem List    Diagnosis Date Noted    Thyroid nodule 08/13/2023    Closed fracture of multiple ribs of right side 03/28/2023    Paroxysmal atrial fibrillation (HCC) 08/15/2019    Incisional hernia 01/19/2017    Coronary artery disease involving native coronary artery of native heart with angina pectoris (HCC) 10/30/2016    S/P CABG x 3 10/22/2015    Chronic constipation 10/22/2015    S/P partial mastectomy     Insomnia 09/13/2011    Vitamin D deficiency 09/14/2010    HTN (hypertension)

## 2024-03-11 ENCOUNTER — OFFICE VISIT (OUTPATIENT)
Age: 84
End: 2024-03-11
Payer: MEDICARE

## 2024-03-11 VITALS
DIASTOLIC BLOOD PRESSURE: 95 MMHG | BODY MASS INDEX: 28.56 KG/M2 | HEART RATE: 78 BPM | OXYGEN SATURATION: 97 % | RESPIRATION RATE: 16 BRPM | WEIGHT: 155.2 LBS | TEMPERATURE: 98.2 F | HEIGHT: 62 IN | SYSTOLIC BLOOD PRESSURE: 162 MMHG

## 2024-03-11 DIAGNOSIS — Z09 HOSPITAL DISCHARGE FOLLOW-UP: ICD-10-CM

## 2024-03-11 DIAGNOSIS — M35.3 PMR (POLYMYALGIA RHEUMATICA) (HCC): Primary | ICD-10-CM

## 2024-03-11 DIAGNOSIS — I48.0 PAF (PAROXYSMAL ATRIAL FIBRILLATION) (HCC): ICD-10-CM

## 2024-03-11 PROCEDURE — 3080F DIAST BP >= 90 MM HG: CPT | Performed by: INTERNAL MEDICINE

## 2024-03-11 PROCEDURE — G8427 DOCREV CUR MEDS BY ELIG CLIN: HCPCS | Performed by: INTERNAL MEDICINE

## 2024-03-11 PROCEDURE — 1123F ACP DISCUSS/DSCN MKR DOCD: CPT | Performed by: INTERNAL MEDICINE

## 2024-03-11 PROCEDURE — 99211 OFF/OP EST MAY X REQ PHY/QHP: CPT

## 2024-03-11 PROCEDURE — 1036F TOBACCO NON-USER: CPT | Performed by: INTERNAL MEDICINE

## 2024-03-11 PROCEDURE — G8399 PT W/DXA RESULTS DOCUMENT: HCPCS | Performed by: INTERNAL MEDICINE

## 2024-03-11 PROCEDURE — 3077F SYST BP >= 140 MM HG: CPT | Performed by: INTERNAL MEDICINE

## 2024-03-11 PROCEDURE — G8419 CALC BMI OUT NRM PARAM NOF/U: HCPCS | Performed by: INTERNAL MEDICINE

## 2024-03-11 PROCEDURE — 1111F DSCHRG MED/CURRENT MED MERGE: CPT | Performed by: INTERNAL MEDICINE

## 2024-03-11 PROCEDURE — 1090F PRES/ABSN URINE INCON ASSESS: CPT | Performed by: INTERNAL MEDICINE

## 2024-03-11 PROCEDURE — G8484 FLU IMMUNIZE NO ADMIN: HCPCS | Performed by: INTERNAL MEDICINE

## 2024-03-11 PROCEDURE — 99214 OFFICE O/P EST MOD 30 MIN: CPT | Performed by: INTERNAL MEDICINE

## 2024-03-11 RX ORDER — PREDNISONE 10 MG/1
10 TABLET ORAL DAILY
Qty: 7 TABLET | Refills: 0 | Status: SHIPPED | OUTPATIENT
Start: 2024-03-11 | End: 2024-03-18

## 2024-03-11 RX ORDER — OMEPRAZOLE 20 MG/1
20 CAPSULE, DELAYED RELEASE ORAL
Qty: 30 CAPSULE | Refills: 0 | Status: SHIPPED | OUTPATIENT
Start: 2024-03-11

## 2024-03-11 RX ORDER — PREDNISONE 5 MG/1
5 TABLET ORAL DAILY
Qty: 90 TABLET | Refills: 1 | Status: SHIPPED | OUTPATIENT
Start: 2024-03-11

## 2024-03-11 SDOH — ECONOMIC STABILITY: INCOME INSECURITY: HOW HARD IS IT FOR YOU TO PAY FOR THE VERY BASICS LIKE FOOD, HOUSING, MEDICAL CARE, AND HEATING?: NOT HARD AT ALL

## 2024-03-11 SDOH — ECONOMIC STABILITY: FOOD INSECURITY: WITHIN THE PAST 12 MONTHS, YOU WORRIED THAT YOUR FOOD WOULD RUN OUT BEFORE YOU GOT MONEY TO BUY MORE.: NEVER TRUE

## 2024-03-11 SDOH — ECONOMIC STABILITY: FOOD INSECURITY: WITHIN THE PAST 12 MONTHS, THE FOOD YOU BOUGHT JUST DIDN'T LAST AND YOU DIDN'T HAVE MONEY TO GET MORE.: NEVER TRUE

## 2024-03-11 ASSESSMENT — PATIENT HEALTH QUESTIONNAIRE - PHQ9
SUM OF ALL RESPONSES TO PHQ QUESTIONS 1-9: 0
2. FEELING DOWN, DEPRESSED OR HOPELESS: 0
SUM OF ALL RESPONSES TO PHQ QUESTIONS 1-9: 0
SUM OF ALL RESPONSES TO PHQ9 QUESTIONS 1 & 2: 0
1. LITTLE INTEREST OR PLEASURE IN DOING THINGS: 0
SUM OF ALL RESPONSES TO PHQ QUESTIONS 1-9: 0
SUM OF ALL RESPONSES TO PHQ QUESTIONS 1-9: 0

## 2024-03-11 ASSESSMENT — ENCOUNTER SYMPTOMS
EYE PAIN: 0
SORE THROAT: 0
SHORTNESS OF BREATH: 0
ANAL BLEEDING: 0
BLOOD IN STOOL: 0
COUGH: 0
ABDOMINAL PAIN: 0

## 2024-03-11 NOTE — PROGRESS NOTES
Minnie Brasher presents today for   Chief Complaint   Patient presents with    Follow-Up from Hospital           \"Have you been to the ER, urgent care clinic since your last visit?  Hospitalized since your last visit?\"    YES - When: approximately 2  weeks ago.  Where and Why: Fainting heart palpatations.    “Have you seen or consulted any other health care providers outside of [facility] since your last visit?”    YES - When: approximately 1  weeks ago.  Where and Why: Cardio Dr. Qureshi.            
time was spent in counseling and/or coordination of care.      Voice recognition was used to generate this report, which may have resulted in some phonetic based errors in grammar and contents. Even though attempts were made to correct all the mistakes, some may have been missed, and remained in the body of the document.      No follow-up provider specified.    Caron Aguilar MD

## 2024-03-13 ENCOUNTER — OFFICE VISIT (OUTPATIENT)
Age: 84
End: 2024-03-13
Payer: MEDICARE

## 2024-03-13 VITALS
HEART RATE: 74 BPM | SYSTOLIC BLOOD PRESSURE: 132 MMHG | OXYGEN SATURATION: 98 % | HEIGHT: 62 IN | DIASTOLIC BLOOD PRESSURE: 80 MMHG | WEIGHT: 154 LBS | BODY MASS INDEX: 28.34 KG/M2

## 2024-03-13 DIAGNOSIS — Z95.1 S/P CABG X 3: ICD-10-CM

## 2024-03-13 DIAGNOSIS — I25.10 ATHEROSCLEROSIS OF NATIVE CORONARY ARTERY OF NATIVE HEART WITHOUT ANGINA PECTORIS: ICD-10-CM

## 2024-03-13 DIAGNOSIS — I48.0 PAROXYSMAL ATRIAL FIBRILLATION (HCC): Primary | ICD-10-CM

## 2024-03-13 PROCEDURE — 1090F PRES/ABSN URINE INCON ASSESS: CPT | Performed by: PHYSICIAN ASSISTANT

## 2024-03-13 PROCEDURE — G8484 FLU IMMUNIZE NO ADMIN: HCPCS | Performed by: PHYSICIAN ASSISTANT

## 2024-03-13 PROCEDURE — 1036F TOBACCO NON-USER: CPT | Performed by: PHYSICIAN ASSISTANT

## 2024-03-13 PROCEDURE — 3079F DIAST BP 80-89 MM HG: CPT | Performed by: PHYSICIAN ASSISTANT

## 2024-03-13 PROCEDURE — 3075F SYST BP GE 130 - 139MM HG: CPT | Performed by: PHYSICIAN ASSISTANT

## 2024-03-13 PROCEDURE — 99213 OFFICE O/P EST LOW 20 MIN: CPT | Performed by: PHYSICIAN ASSISTANT

## 2024-03-13 PROCEDURE — 1123F ACP DISCUSS/DSCN MKR DOCD: CPT | Performed by: PHYSICIAN ASSISTANT

## 2024-03-13 PROCEDURE — G8428 CUR MEDS NOT DOCUMENT: HCPCS | Performed by: PHYSICIAN ASSISTANT

## 2024-03-13 PROCEDURE — 93000 ELECTROCARDIOGRAM COMPLETE: CPT | Performed by: PHYSICIAN ASSISTANT

## 2024-03-13 PROCEDURE — G8419 CALC BMI OUT NRM PARAM NOF/U: HCPCS | Performed by: PHYSICIAN ASSISTANT

## 2024-03-13 PROCEDURE — G8399 PT W/DXA RESULTS DOCUMENT: HCPCS | Performed by: PHYSICIAN ASSISTANT

## 2024-03-13 RX ORDER — QUETIAPINE FUMARATE 25 MG/1
12.5 TABLET, FILM COATED ORAL
COMMUNITY

## 2024-03-13 NOTE — PROGRESS NOTES
[facility]    Minnie Brasher is a 83 y.o. female who presents to the office today in cardiac evaluation.  She recently transferred care from Dr. Cota to Dr. Giovanny Qureshi.  She has paroxysmal afib, CAD s/p CABG x 3 in 2015.     She was prescribed Eliquis for anticoagulation at the time of her office visit last month.  She was seen by her PCP a week following her visit and had been having some fatigue with exertion and felt lightheaded/near syncopal.  She was sent to the ER, at which time she had EKG demonstrating atrial flutter and negative troponin x 1; she reportedly converted to NSR with HR 80's.      She denies any recurrent episodes of near syncope since that time.  She is still wearing event monitor.  She denies any new symptoms.    Denies any nausea, vomiting, abdominal pain, fever, chills, sputum production. No hematuria or other bleeding complaints    Past Medical History:   Diagnosis Date    Anxiety     Ataxic gait 10/21/2010    Breast cancer ([facility]) 05/2014    left side    CAD (coronary artery disease) 10/2015    Diverticulosis     GERD (gastroesophageal reflux disease)     Herpes     Hypercholesterolemia     Hypertension     Osteopenia     PAF (paroxysmal atrial fibrillation) ([facility]) 06/07/2019    Diagnosed on 30-day event monitor    Rheumatoid arthritis ([facility])     S/P CABG x 3 10/2015    LIMA to LAD, SVG to OM, SVG to distal RCA    S/P partial mastectomy          Past Surgical History:   Procedure Laterality Date    BACK SURGERY  1980    BREAST BIOPSY      right (2000), left (2011)    COLONOSCOPY  09/2008    HYSTERECTOMY (CERVIX STATUS UNKNOWN)  10/2009    complete, fibroid    LAP, INCISIONAL HERNIA REPAIR,REDUCIBLE N/A 01/19/2017    Dr. Alarcon    MASTECTOMY  6./8/11    partial left with sentinel lymph node biopsy    MASTECTOMY Left 9/10/2014    LEFT PARTIAL MASTECTOMY performed by Nico Lozano MD at [facility] MAIN OR    ORTHOPEDIC SURGERY Right 7/2014    ROTATOR CUFF    ORTHOPEDIC SURGERY

## 2024-04-08 ENCOUNTER — HOSPITAL ENCOUNTER (OUTPATIENT)
Facility: HOSPITAL | Age: 84
Setting detail: SPECIMEN
Discharge: HOME | End: 2024-04-11
Payer: MEDICARE

## 2024-04-08 ENCOUNTER — OFFICE VISIT (OUTPATIENT)
Age: 84
End: 2024-04-08
Payer: MEDICARE

## 2024-04-08 VITALS
OXYGEN SATURATION: 95 % | WEIGHT: 156 LBS | DIASTOLIC BLOOD PRESSURE: 90 MMHG | HEART RATE: 71 BPM | BODY MASS INDEX: 28.53 KG/M2 | SYSTOLIC BLOOD PRESSURE: 130 MMHG

## 2024-04-08 DIAGNOSIS — E78.00 HYPERCHOLESTEREMIA: ICD-10-CM

## 2024-04-08 DIAGNOSIS — I48.0 PAROXYSMAL ATRIAL FIBRILLATION (HCC): ICD-10-CM

## 2024-04-08 DIAGNOSIS — M35.3 PMR (POLYMYALGIA RHEUMATICA) (HCC): ICD-10-CM

## 2024-04-08 DIAGNOSIS — I10 ESSENTIAL HYPERTENSION WITH GOAL BLOOD PRESSURE LESS THAN 140/90: Primary | ICD-10-CM

## 2024-04-08 DIAGNOSIS — I25.119 CORONARY ARTERY DISEASE INVOLVING NATIVE CORONARY ARTERY OF NATIVE HEART WITH ANGINA PECTORIS (HCC): ICD-10-CM

## 2024-04-08 LAB
CHOLEST SERPL-MCNC: 173 MG/DL
CRP SERPL-MCNC: <0.3 MG/DL (ref 0–0.3)
ERYTHROCYTE [SEDIMENTATION RATE] IN BLOOD: 24 MM/HR (ref 0–30)
EST. AVERAGE GLUCOSE BLD GHB EST-MCNC: 126 MG/DL
HBA1C MFR BLD: 6 % (ref 4.2–5.6)
HDLC SERPL-MCNC: 59 MG/DL (ref 40–60)
HDLC SERPL: 2.9 (ref 0–5)
LDLC SERPL CALC-MCNC: 90.6 MG/DL (ref 0–100)
LIPID PANEL: NORMAL
TRIGL SERPL-MCNC: 117 MG/DL
VLDLC SERPL CALC-MCNC: 23.4 MG/DL

## 2024-04-08 PROCEDURE — 85652 RBC SED RATE AUTOMATED: CPT

## 2024-04-08 PROCEDURE — 99214 OFFICE O/P EST MOD 30 MIN: CPT | Performed by: INTERNAL MEDICINE

## 2024-04-08 PROCEDURE — 1036F TOBACCO NON-USER: CPT | Performed by: INTERNAL MEDICINE

## 2024-04-08 PROCEDURE — 3075F SYST BP GE 130 - 139MM HG: CPT | Performed by: INTERNAL MEDICINE

## 2024-04-08 PROCEDURE — G8399 PT W/DXA RESULTS DOCUMENT: HCPCS | Performed by: INTERNAL MEDICINE

## 2024-04-08 PROCEDURE — G8419 CALC BMI OUT NRM PARAM NOF/U: HCPCS | Performed by: INTERNAL MEDICINE

## 2024-04-08 PROCEDURE — 1123F ACP DISCUSS/DSCN MKR DOCD: CPT | Performed by: INTERNAL MEDICINE

## 2024-04-08 PROCEDURE — 80061 LIPID PANEL: CPT

## 2024-04-08 PROCEDURE — 1090F PRES/ABSN URINE INCON ASSESS: CPT | Performed by: INTERNAL MEDICINE

## 2024-04-08 PROCEDURE — 86140 C-REACTIVE PROTEIN: CPT

## 2024-04-08 PROCEDURE — 83036 HEMOGLOBIN GLYCOSYLATED A1C: CPT

## 2024-04-08 PROCEDURE — 3080F DIAST BP >= 90 MM HG: CPT | Performed by: INTERNAL MEDICINE

## 2024-04-08 PROCEDURE — G8428 CUR MEDS NOT DOCUMENT: HCPCS | Performed by: INTERNAL MEDICINE

## 2024-04-08 PROCEDURE — 36415 COLL VENOUS BLD VENIPUNCTURE: CPT

## 2024-04-08 RX ORDER — METOPROLOL SUCCINATE 25 MG/1
25 TABLET, EXTENDED RELEASE ORAL 2 TIMES DAILY
Qty: 180 TABLET | Refills: 2 | Status: SHIPPED | OUTPATIENT
Start: 2024-04-08

## 2024-04-08 RX ORDER — OMEPRAZOLE 20 MG/1
20 CAPSULE, DELAYED RELEASE ORAL
Qty: 90 CAPSULE | Refills: 2 | Status: SHIPPED | OUTPATIENT
Start: 2024-04-08

## 2024-04-08 NOTE — PROGRESS NOTES
07/17/2023 12:15 PM (Final)  Interpretation Summary    Perfusion Comments: LV perfusion is abnormal.    Stress Combined Conclusion: Findings suggest a low risk of myocardial ischemia.    Perfusion Defect: There is a mild severity left ventricular stress perfusion defect that is small in size present in the basal inferior segment(s) that is predominantly fixed. There is abnormal wall motion in the defect area. The defect appears to be probable infarction.    Perfusion Conclusion: TID ratio is 1.03.    ECG: Resting ECG demonstrates normal sinus rhythm.    ECG: The ECG was negative for ischemia.    Stress Test: A pharmacological stress test was performed using lexiscan. Hemodynamics are suboptimal due to medication. Blood pressure demonstrated a normal response and heart rate demonstrated a normal response to stress. The patient's heart rate recovery was normal.    IMPRESSION & PLAN:  Minnie Brasher  is 83 y.o. female with    Paroxysmal atrial fibrillation.   Event monitor in 2019 with several A-fib episode  Chest vascular score of at least 3  Event monitor 03/20245: Multiple short episodes of atrial flutter.  Longest episode lasting for 32 minutes.  Total atrial flutter/fibrillation burden 7%.  Average heart rate while in arrhythmia 92 bpm, range .    I will increase Toprol dose to 25 mg twice daily.  Continue apixaban    CAD:  CABG x 3 with LIMA to LAD, SVG to OM and SVG to RCA in 2015  NST in 07/2023, low restudy without any ischemia or infarct  Currently on Crestor, metoprolol currently without any angina    Hyperlipidemia: Currently on Crestor.  Continue same.  Goal LDL less than 70 is recommended from cardiovascular standpoint    This plan was discussed with patient who is in agreement.    Thank you for allowing me to participate in patient care. Please feel free to call me if you have any question or concern.     Giovanny Qureshi MD  Please note: This document has been produced using voice recognition

## 2024-04-08 NOTE — TELEPHONE ENCOUNTER
Last OV: 03/11/24  Next OV: 04/15/24  Last RX: 03/11/24    
Sent to provider for rvw.      SAADIA Womack LPN  
This patient contacted office for the following prescriptions to be filled:    Medication requested : omeprazole (PRILOSEC) 20 MG delayed release capsule   PCP: Jeff   Pharmacy or Print: [place]  Mail order or Local pharmacy  [facility] [id number] - [place], [place] - [address] - P [phone] - F [phone] [[id number]]     Scheduled appointment if not seen by current providers in office: LOV: 3.11.24  FU: 4.15.24    #:  [phone]  
absent

## 2024-04-15 ENCOUNTER — OFFICE VISIT (OUTPATIENT)
Age: 84
End: 2024-04-15
Payer: MEDICARE

## 2024-04-15 VITALS
DIASTOLIC BLOOD PRESSURE: 72 MMHG | HEART RATE: 79 BPM | SYSTOLIC BLOOD PRESSURE: 120 MMHG | RESPIRATION RATE: 17 BRPM | BODY MASS INDEX: 28.52 KG/M2 | TEMPERATURE: 98.5 F | OXYGEN SATURATION: 92 % | HEIGHT: 62 IN | WEIGHT: 155 LBS

## 2024-04-15 DIAGNOSIS — M25.551 RIGHT HIP PAIN: ICD-10-CM

## 2024-04-15 DIAGNOSIS — M35.3 PMR (POLYMYALGIA RHEUMATICA) (HCC): ICD-10-CM

## 2024-04-15 DIAGNOSIS — I48.0 PAF (PAROXYSMAL ATRIAL FIBRILLATION) (HCC): ICD-10-CM

## 2024-04-15 DIAGNOSIS — M25.561 ACUTE PAIN OF RIGHT KNEE: ICD-10-CM

## 2024-04-15 DIAGNOSIS — M54.41 ACUTE RIGHT-SIDED LOW BACK PAIN WITH RIGHT-SIDED SCIATICA: ICD-10-CM

## 2024-04-15 DIAGNOSIS — E04.1 THYROID NODULE: ICD-10-CM

## 2024-04-15 DIAGNOSIS — R73.9 HYPERGLYCEMIA: ICD-10-CM

## 2024-04-15 DIAGNOSIS — L98.9 SKIN LESION: Primary | ICD-10-CM

## 2024-04-15 PROCEDURE — 3074F SYST BP LT 130 MM HG: CPT | Performed by: INTERNAL MEDICINE

## 2024-04-15 PROCEDURE — 1036F TOBACCO NON-USER: CPT | Performed by: INTERNAL MEDICINE

## 2024-04-15 PROCEDURE — G8419 CALC BMI OUT NRM PARAM NOF/U: HCPCS | Performed by: INTERNAL MEDICINE

## 2024-04-15 PROCEDURE — 3078F DIAST BP <80 MM HG: CPT | Performed by: INTERNAL MEDICINE

## 2024-04-15 PROCEDURE — G8399 PT W/DXA RESULTS DOCUMENT: HCPCS | Performed by: INTERNAL MEDICINE

## 2024-04-15 PROCEDURE — G8427 DOCREV CUR MEDS BY ELIG CLIN: HCPCS | Performed by: INTERNAL MEDICINE

## 2024-04-15 PROCEDURE — 1123F ACP DISCUSS/DSCN MKR DOCD: CPT | Performed by: INTERNAL MEDICINE

## 2024-04-15 PROCEDURE — 1090F PRES/ABSN URINE INCON ASSESS: CPT | Performed by: INTERNAL MEDICINE

## 2024-04-15 PROCEDURE — 99214 OFFICE O/P EST MOD 30 MIN: CPT | Performed by: INTERNAL MEDICINE

## 2024-04-15 ASSESSMENT — ENCOUNTER SYMPTOMS
SORE THROAT: 0
BLOOD IN STOOL: 0
COUGH: 0
ANAL BLEEDING: 0
BACK PAIN: 1
SHORTNESS OF BREATH: 0
ABDOMINAL PAIN: 0
EYE PAIN: 0

## 2024-04-15 NOTE — PROGRESS NOTES
Minnie ADIN Brasher presents today for No chief complaint on file.          \"Have you been to the ER, urgent care clinic since your last visit?  Hospitalized since your last visit?\"    NO    “Have you seen or consulted any other health care providers outside of [facility] since your last visit?”    NO             
[facility] Due   Topic Date Due    COVID-19 Vaccine (1) Never done    DTaP/Tdap/Td vaccine (1 - Tdap) Never done    Shingles vaccine (1 of 2) Never done    Respiratory Syncytial Virus (RSV) Pregnant or age 60 yrs+ (1 - 1-dose 60+ series) Never done    Pneumococcal 65+ years Vaccine (2 of 2 - PCV) 10/01/2014         Lab review: labs are reviewed in the EHR and ordered as mentioned above    I have discussed the diagnosis with the patient and the intended plan as seen in the above orders.  The patient has received an after-visit summary and questions were answered concerning future plans.  I have discussed medication side effects and warnings with the patient as well. I have reviewed the plan of care with the patient, accepted their input and they are in agreement with the treatment goals. All questions were answered. The patient understands the plan of care. Handouts provided today with above information. Pt instructed if symptoms worsen to call the office or report to the ED for continued care.  Greater than 50% of the visit time was spent in counseling and/or coordination of care.      Voice recognition was used to generate this report, which may have resulted in some phonetic based errors in grammar and contents. Even though attempts were made to correct all the mistakes, some may have been missed, and remained in the body of the document.      No follow-up provider specified.    Caron Aguilar MD

## 2024-04-16 ENCOUNTER — HOSPITAL ENCOUNTER (OUTPATIENT)
Facility: HOSPITAL | Age: 84
Discharge: HOME | End: 2024-04-19
Payer: MEDICARE

## 2024-04-16 DIAGNOSIS — M25.561 ACUTE PAIN OF RIGHT KNEE: ICD-10-CM

## 2024-04-16 DIAGNOSIS — M54.41 ACUTE RIGHT-SIDED LOW BACK PAIN WITH RIGHT-SIDED SCIATICA: ICD-10-CM

## 2024-04-16 DIAGNOSIS — M25.551 RIGHT HIP PAIN: ICD-10-CM

## 2024-04-16 PROCEDURE — 73502 X-RAY EXAM HIP UNI 2-3 VIEWS: CPT

## 2024-04-16 PROCEDURE — 73562 X-RAY EXAM OF KNEE 3: CPT

## 2024-04-16 PROCEDURE — 72110 X-RAY EXAM L-2 SPINE 4/>VWS: CPT

## 2024-04-17 ENCOUNTER — TELEPHONE (OUTPATIENT)
Age: 84
End: 2024-04-17

## 2024-04-17 NOTE — TELEPHONE ENCOUNTER
Called pt to notify results. Unable to leave VM due to no mailbox set up.       Update: Pt returned call, updated on results. Pt verbalized understanding.      SAADIA Womack LPN

## 2024-04-17 NOTE — TELEPHONE ENCOUNTER
----- Message from Caron Aguilar MD sent at 4/17/2024  8:55 AM EDT -----  Please let her know that her right hip xray shows mild to moderate OA.     Dr. Caron Aguilar  [facility] [address], [address]  [place] [address]  Phone: [phone]  Fax: [phone]

## 2024-04-26 ENCOUNTER — TELEPHONE (OUTPATIENT)
Age: 84
End: 2024-04-26

## 2024-04-26 NOTE — TELEPHONE ENCOUNTER
----- Message from Caron Aguilar MD sent at 4/26/2024  1:37 PM EDT -----  Please let her know that her x-ray result shows findings consistent with lumbar facet arthropathy (pinched nerves) and disc disease.  If she continues to have lower back pain, she should schedule an appointment with orthopedics for evaluation.    Dr. Caron Aguilar  [facility] [address], [address]  [place] [address]  Phone: [phone]  Fax: [phone]

## 2024-05-14 ENCOUNTER — HOSPITAL ENCOUNTER (OUTPATIENT)
Facility: HOSPITAL | Age: 84
Setting detail: RECURRING SERIES
Discharge: HOME | End: 2024-05-17
Payer: MEDICARE

## 2024-05-14 PROCEDURE — 97161 PT EVAL LOW COMPLEX 20 MIN: CPT

## 2024-05-14 PROCEDURE — 97110 THERAPEUTIC EXERCISES: CPT

## 2024-05-14 NOTE — PROGRESS NOTES
PT DAILY TREATMENT NOTE/KNEE EVAL       Patient Name: Minnie Brasher    Date: 2024    : 1940  Insurance: Payor: MEDICARE / Plan: MEDICARE PART A AND B / Product Type: *No Product type* /      Patient  verified yes     Visit #   Current / Total 1 10   Time   In / Out 0934 1010   Pain   In / Out 0/10 0/10   Subjective Functional Status/Changes: Pt arrives to IE with c/o worsening R knee pain.     Treatment Area: Pain in right knee [M25.561]  Other low back pain [M54.59]  Pain in right hip [M25.551]    SUBJECTIVE  Any medication changes, allergies to medications, adverse drug reactions, diagnosis change, or new procedure performed?: [x] No    [] Yes (see summary sheet for update)    Subjective Info:  Chief complaint: Patient reports pain in R knee and lower back. Saw MD and X-ray says its R hip that's causing the problems. Tried pool/aquatics, but did not like the other people there. Pain started a few weeks ago. Now bothers her going up and down the stairs. Also reports dizziness, possible vertigo.     Current Pain: 0/10 Best Pain: 0/10 Worst Pain: 5/10  Constant/Intermittent: Intermittent   Progression since onset: Worse since onset  Pain Location/Description: Medial/anterior R knee   Current Symptoms: Achy   Current Mobility: No AD, independent  Aggravating/Reliving Factors: Aggravating: stairs (step to with L leading), walking   Relieving: sitting   JERSON: Cannot remember a specific JERSON, but does report that she fell 2 years ago going down the stairs which might've injured the knee but unsure   Previous Treatment: Denies  Self Care: Independent   Activity/Participation Limitations: No limitations, just painful   PLOF: Reciprocal stairs (2 FOS at [facility])   Home-Environment: A few ED home   Past Med Hx/Surgical Hx: HTN, h/o breast cancer, triple bypass surgery, h/o back surgery 30 years ago, h/o R shoulder surgery -- SEE EMR  Allergies: Latex, adhesive tape -- SEE EMR  Work: Helps deliver food to 
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  Overall Complexity Rating: LOW   Problem List: pain affecting function, decrease ROM, decrease strength, edema affection function, impaired gait/balance, decrease ADL/functional abilities, decrease activity tolerance, decrease flexibility/joint mobility, and decrease transfer abilities    Treatment Plan may include any combination of the followin Therapeutic Exercise, 50902 Neuromuscular Re-Education, 10493 Manual Therapy, 74922 Therapeutic Activity, 39048 Self Care/Home Management, 08639 Electrical Stim unattended, 71537 Electrical Stim attended, and 34304 Gait Training  Patient / Family readiness to learn indicated by: asking questions, trying to perform skills, interest, return verbalization , and return demonstration   Persons(s) to be included in education: patient (P)  Barriers to Learning/Limitations: none  Measures taken if barriers to learning present: NA  Patient Goal (s): \"Just to get back to normal\"  Patient Self Reported Health Status: good  Rehabilitation Potential: good    Short Term Goals: To be accomplished in 2 weeks  Pt will be able to report a </= 3/10 pain rating at worst to improve patient's ability to tolerate prolonged functional activities at home.    Eval: 5/10    Pt will be independent with HEP to facilitate carry-over of functional gains made in PT at home & community.     Eval: HEP established, printout provided     Long Term Goals: To be accomplished in 10 treatments    Pt will be able to improve strength in BLE to at least 5/5 to improve patient's ability to perform transfers and stair negotiation at home & community.   Eval: 4/5 B hip flex/ext, 4/5 B knee flext, 4+/5 R knee ext      Pt will be able to improve 5xSTS score to </=15 sec without UE use to improve transfer abilities and to decrease fall risk.   Eval: 20 sec, BUE support (</= 12 sec without UE is fall risk cut-off)     Pt will be able to negotiate x1 FOS with 1 HR and reciprocal pattern

## 2024-05-15 ENCOUNTER — HOSPITAL ENCOUNTER (OUTPATIENT)
Facility: HOSPITAL | Age: 84
Setting detail: RECURRING SERIES
Discharge: HOME | End: 2024-05-18
Payer: MEDICARE

## 2024-05-15 PROCEDURE — 97110 THERAPEUTIC EXERCISES: CPT

## 2024-05-15 PROCEDURE — 97530 THERAPEUTIC ACTIVITIES: CPT

## 2024-05-15 NOTE — PROGRESS NOTES
PHYSICAL / OCCUPATIONAL THERAPY - DAILY TREATMENT NOTE    Patient Name: Minnie Brasher    Date: 5/15/2024    : 1940  Insurance: Payor: MEDICARE / Plan: MEDICARE PART A AND B / Product Type: *No Product type* /      Patient  verified Yes     Visit #   Current / Total 2 10   Time   In / Out 420 450   Pain   In / Out 5 0   Subjective Functional Status/Changes: States that her knee is hurting after having to  10$ worth of quarters.     TREATMENT AREA =  Pain in right knee [M25.561]  Other low back pain [M54.59]  Pain in right hip [M25.551]     OBJECTIVE         Therapeutic Procedures:    Tx Min Billable or 1:1 Min (if diff from Tx Min) Procedure, Rationale, Specifics   20  42354 Therapeutic Exercise (timed):  increase ROM, strength, coordination, balance, and proprioception to improve patient's ability to progress to PLOF and address remaining functional goals. (see flow sheet as applicable)     Details if applicable:       10 10 58323 Therapeutic Activity (timed):  use of dynamic activities replicating functional movements to increase ROM, strength, coordination, balance, and proprioception in order to improve patient's ability to progress to PLOF and address remaining functional goals.  (see flow sheet as applicable)     Details if applicable:  step training   30 30 [facility] Totals Reminder: bill using total billable min of TIMED therapeutic procedures (example: do not include dry needle or estim unattended, both untimed codes, in totals to left)  8-22 min = 1 unit; 23-37 min = 2 units; 38-52 min = 3 units; 53-67 min = 4 units; 68-82 min = 5 units   Total Total     [x]  Patient Education billed concurrently with other procedures   [x] Review HEP    [] Progressed/Changed HEP, detail:    [] Other detail:       Objective Information/Functional Measures/Assessment    Pt presents to PT for their first f/u since initial evaluation. She reported some pain in her right knee on the bike and when performing

## 2024-05-17 RX ORDER — METOPROLOL SUCCINATE 25 MG/1
25 TABLET, EXTENDED RELEASE ORAL 2 TIMES DAILY
Qty: 180 TABLET | Refills: 3 | Status: SHIPPED | OUTPATIENT
Start: 2024-05-17

## 2024-05-23 ENCOUNTER — HOSPITAL ENCOUNTER (OUTPATIENT)
Facility: HOSPITAL | Age: 84
Setting detail: RECURRING SERIES
Discharge: HOME | End: 2024-05-26
Payer: MEDICARE

## 2024-05-23 PROCEDURE — 97110 THERAPEUTIC EXERCISES: CPT

## 2024-05-23 PROCEDURE — 97530 THERAPEUTIC ACTIVITIES: CPT

## 2024-05-23 PROCEDURE — 97112 NEUROMUSCULAR REEDUCATION: CPT

## 2024-05-23 NOTE — PROGRESS NOTES
PHYSICAL / OCCUPATIONAL THERAPY - DAILY TREATMENT NOTE    Patient Name: Minnie Brasher    Date: 2024    : 1940  Insurance: Payor: MEDICARE / Plan: MEDICARE PART A AND B / Product Type: *No Product type* /      Patient  verified Yes     Visit #   Current / Total 3 10   Time   In / Out 130 209   Pain   In / Out 5 0   Subjective Functional Status/Changes: States that her knee is hurting today, with most pain with bending over     TREATMENT AREA =  Pain in right knee [M25.561]  Other low back pain [M54.59]  Pain in right hip [M25.551]     OBJECTIVE         Therapeutic Procedures:    Tx Min Billable or 1:1 Min (if diff from Tx Min) Procedure, Rationale, Specifics   15 15 70101 Therapeutic Exercise (timed):  increase ROM, strength, coordination, balance, and proprioception to improve patient's ability to progress to PLOF and address remaining functional goals. (see flow sheet as applicable)     Details if applicable:       15 15 79483 Neuromuscular Re-Education (timed):  improve balance, coordination, kinesthetic sense, posture, core stability and proprioception to improve patient's ability to develop conscious control of individual muscles and awareness of position of extremities in order to progress to PLOF and address remaining functional goals. (see flow sheet as applicable)     Details if applicable:     97530 Therapeutic Activity (timed):  use of dynamic activities replicating functional movements to increase ROM, strength, coordination, balance, and proprioception in order to improve patient's ability to progress to PLOF and address remaining functional goals.  (see flow sheet as applicable)     Details if applicable:     39 39 [facility] Totals Reminder: bill using total billable min of TIMED therapeutic procedures (example: do not include dry needle or estim unattended, both untimed codes, in totals to left)  8-22 min = 1 unit; 23-37 min = 2 units; 38-52 min = 3 units; 53-67 min = 4 units; 68-82

## 2024-05-29 ENCOUNTER — APPOINTMENT (OUTPATIENT)
Facility: HOSPITAL | Age: 84
End: 2024-05-29
Payer: MEDICARE

## 2024-06-03 ENCOUNTER — HOSPITAL ENCOUNTER (OUTPATIENT)
Facility: HOSPITAL | Age: 84
Setting detail: RECURRING SERIES
Discharge: HOME | End: 2024-06-06
Payer: MEDICARE

## 2024-06-03 PROCEDURE — 97530 THERAPEUTIC ACTIVITIES: CPT

## 2024-06-03 PROCEDURE — 97112 NEUROMUSCULAR REEDUCATION: CPT

## 2024-06-03 PROCEDURE — 97110 THERAPEUTIC EXERCISES: CPT

## 2024-06-03 NOTE — PROGRESS NOTES
PHYSICAL / OCCUPATIONAL THERAPY - DAILY TREATMENT NOTE    Patient Name: Minnie Brasher    Date: 6/3/2024    : 1940  Insurance: Payor: MEDICARE / Plan: MEDICARE PART A AND B / Product Type: *No Product type* /      Patient  verified Yes     Visit #   Current / Total 4 10   Time   In / Out 1050 1128   Pain   In / Out 5 1   Subjective Functional Status/Changes: States that her knees don't feel bad, back hurt more due to riding a beach hummer over the weekend at [employer].     TREATMENT AREA =  Pain in right knee [M25.561]  Other low back pain [M54.59]  Pain in right hip [M25.551]     OBJECTIVE         Therapeutic Procedures:    Tx Min Billable or 1:1 Min (if diff from Tx Min) Procedure, Rationale, Specifics   15 15 02512 Therapeutic Exercise (timed):  increase ROM, strength, coordination, balance, and proprioception to improve patient's ability to progress to PLOF and address remaining functional goals. (see flow sheet as applicable)     Details if applicable:       15 15 56216 Neuromuscular Re-Education (timed):  improve balance, coordination, kinesthetic sense, posture, core stability and proprioception to improve patient's ability to develop conscious control of individual muscles and awareness of position of extremities in order to progress to PLOF and address remaining functional goals. (see flow sheet as applicable)     Details if applicable:      Therapeutic Activity (timed):  use of dynamic activities replicating functional movements to increase ROM, strength, coordination, balance, and proprioception in order to improve patient's ability to progress to PLOF and address remaining functional goals.  (see flow sheet as applicable)     Details if applicable:     38 38 [facility] Totals Reminder: bill using total billable min of TIMED therapeutic procedures (example: do not include dry needle or estim unattended, both untimed codes, in totals to left)  8-22 min = 1 unit; 23-37 min = 2 units; 38-52 min = 3

## 2024-06-05 ENCOUNTER — HOSPITAL ENCOUNTER (OUTPATIENT)
Facility: HOSPITAL | Age: 84
Setting detail: RECURRING SERIES
Discharge: HOME | End: 2024-06-08
Payer: MEDICARE

## 2024-06-05 PROCEDURE — 97112 NEUROMUSCULAR REEDUCATION: CPT

## 2024-06-05 PROCEDURE — 97530 THERAPEUTIC ACTIVITIES: CPT

## 2024-06-05 PROCEDURE — 97110 THERAPEUTIC EXERCISES: CPT

## 2024-06-05 NOTE — PROGRESS NOTES
PHYSICAL / OCCUPATIONAL THERAPY - DAILY TREATMENT NOTE    Patient Name: Minnie Brasher    Date: 2024    : 1940  Insurance: Payor: MEDICARE / Plan: MEDICARE PART A AND B / Product Type: *No Product type* /      Patient  verified Yes     Visit #   Current / Total 5 10   Time   In / Out 1050 1128   Pain   In / Out 0 0   Subjective Functional Status/Changes: Pt reports she has no pain in her knee but her back is hurting from doing laundry all day yesterday.     TREATMENT AREA =  Pain in right knee [M25.561]  Other low back pain [M54.59]  Pain in right hip [M25.551]     OBJECTIVE    Therapeutic Procedures:    Tx Min Billable or 1:1 Min (if diff from Tx Min) Procedure, Rationale, Specifics   13  63539 Therapeutic Exercise (timed):  increase ROM, strength, coordination, balance, and proprioception to improve patient's ability to progress to PLOF and address remaining functional goals. (see flow sheet as applicable)     Details if applicable:       10  09645 Neuromuscular Re-Education (timed):  improve balance, coordination, kinesthetic sense, posture, core stability and proprioception to improve patient's ability to develop conscious control of individual muscles and awareness of position of extremities in order to progress to PLOF and address remaining functional goals. (see flow sheet as applicable)     Details if applicable:  core/hip re-ed, balance   15  98046 Therapeutic Activity (timed):  use of dynamic activities replicating functional movements to increase ROM, strength, coordination, balance, and proprioception in order to improve patient's ability to progress to PLOF and address remaining functional goals.  (see flow sheet as applicable)     Details if applicable:  standing functional hip strength   38  [facility] Totals Reminder: bill using total billable min of TIMED therapeutic procedures (example: do not include dry needle or estim unattended, both untimed codes, in totals to left)  8-22 min = 1

## 2024-06-10 ENCOUNTER — APPOINTMENT (OUTPATIENT)
Facility: HOSPITAL | Age: 84
End: 2024-06-10
Payer: MEDICARE

## 2024-06-12 ENCOUNTER — HOSPITAL ENCOUNTER (OUTPATIENT)
Facility: HOSPITAL | Age: 84
Setting detail: RECURRING SERIES
End: 2024-06-12
Payer: MEDICARE

## 2024-06-12 ENCOUNTER — TELEPHONE (OUTPATIENT)
Facility: CLINIC | Age: 84
End: 2024-06-12

## 2024-06-13 ENCOUNTER — APPOINTMENT (OUTPATIENT)
Facility: HOSPITAL | Age: 84
End: 2024-06-13
Payer: MEDICARE

## 2024-06-17 ENCOUNTER — APPOINTMENT (OUTPATIENT)
Facility: HOSPITAL | Age: 84
End: 2024-06-17
Payer: MEDICARE

## 2024-06-19 ENCOUNTER — APPOINTMENT (OUTPATIENT)
Facility: HOSPITAL | Age: 84
End: 2024-06-19
Payer: MEDICARE

## 2024-06-24 ENCOUNTER — OFFICE VISIT (OUTPATIENT)
Facility: CLINIC | Age: 84
End: 2024-06-24
Payer: MEDICARE

## 2024-06-24 ENCOUNTER — APPOINTMENT (OUTPATIENT)
Facility: HOSPITAL | Age: 84
End: 2024-06-24
Payer: MEDICARE

## 2024-06-24 VITALS
SYSTOLIC BLOOD PRESSURE: 114 MMHG | RESPIRATION RATE: 17 BRPM | OXYGEN SATURATION: 94 % | TEMPERATURE: 98.5 F | HEART RATE: 71 BPM | BODY MASS INDEX: 28.49 KG/M2 | DIASTOLIC BLOOD PRESSURE: 73 MMHG | WEIGHT: 154.8 LBS | HEIGHT: 62 IN

## 2024-06-24 DIAGNOSIS — M54.41 ACUTE RIGHT-SIDED LOW BACK PAIN WITH RIGHT-SIDED SCIATICA: ICD-10-CM

## 2024-06-24 DIAGNOSIS — E78.5 HYPERLIPIDEMIA, UNSPECIFIED HYPERLIPIDEMIA TYPE: ICD-10-CM

## 2024-06-24 DIAGNOSIS — R07.81 RIB PAIN ON RIGHT SIDE: ICD-10-CM

## 2024-06-24 DIAGNOSIS — I48.0 PAF (PAROXYSMAL ATRIAL FIBRILLATION) (HCC): ICD-10-CM

## 2024-06-24 DIAGNOSIS — E04.1 THYROID NODULE: ICD-10-CM

## 2024-06-24 DIAGNOSIS — R10.9 RIGHT FLANK PAIN: Primary | ICD-10-CM

## 2024-06-24 PROCEDURE — 1036F TOBACCO NON-USER: CPT | Performed by: INTERNAL MEDICINE

## 2024-06-24 PROCEDURE — 1090F PRES/ABSN URINE INCON ASSESS: CPT | Performed by: INTERNAL MEDICINE

## 2024-06-24 PROCEDURE — 3074F SYST BP LT 130 MM HG: CPT | Performed by: INTERNAL MEDICINE

## 2024-06-24 PROCEDURE — 3078F DIAST BP <80 MM HG: CPT | Performed by: INTERNAL MEDICINE

## 2024-06-24 PROCEDURE — G8399 PT W/DXA RESULTS DOCUMENT: HCPCS | Performed by: INTERNAL MEDICINE

## 2024-06-24 PROCEDURE — 1123F ACP DISCUSS/DSCN MKR DOCD: CPT | Performed by: INTERNAL MEDICINE

## 2024-06-24 PROCEDURE — G8419 CALC BMI OUT NRM PARAM NOF/U: HCPCS | Performed by: INTERNAL MEDICINE

## 2024-06-24 PROCEDURE — G8427 DOCREV CUR MEDS BY ELIG CLIN: HCPCS | Performed by: INTERNAL MEDICINE

## 2024-06-24 PROCEDURE — 99214 OFFICE O/P EST MOD 30 MIN: CPT | Performed by: INTERNAL MEDICINE

## 2024-06-24 RX ORDER — ROSUVASTATIN CALCIUM 10 MG/1
10 TABLET, COATED ORAL NIGHTLY
Qty: 90 TABLET | Refills: 3 | OUTPATIENT
Start: 2024-06-24

## 2024-06-24 RX ORDER — ROSUVASTATIN CALCIUM 10 MG/1
10 TABLET, COATED ORAL NIGHTLY
Qty: 90 TABLET | Refills: 3 | Status: SHIPPED | OUTPATIENT
Start: 2024-06-24

## 2024-06-24 NOTE — PROGRESS NOTES
Minnie Brasher presents today for   Chief Complaint   Patient presents with    Flank Pain       Bp retake: 114/73    \"Have you been to the ER, urgent care clinic since your last visit?  Hospitalized since your last visit?\"    NO    “Have you seen or consulted any other health care providers outside of [facility] since your last visit?”    NO

## 2024-06-24 NOTE — PROGRESS NOTES
[facility]:  7/1/2024, MRN: [id number]      Minnie Brasher is a 83 y.o. female and presents to clinic for Flank Pain      Subjective:   The pt is an 84yo female with a h/o HTN, AF, CAD, fracture, HLD, vitamin D deficiency, left breast cancer status post lumpectomy, HLD, rib fractures, diverticulosis (per imaging), cervical facet arthropathy, PMR, cervical disc disease, anxiety, hearing loss, prediabetes, thyroid nodule hx (followed by ENT), right rotator cuff tendinopathy.      1. Right Flank Pain: Urinary sx: she has a strong odor to her urine but admits to not drinking a lot of water. No hematuria or dysuria.  No SOB. No cough. Lying down on her right side triggers her pain. Sitting can trigger it. Sx are not exertional. She has had sx since she broke her ribs.  Her pain feels like a \"knot.\" \"It feels like I'm lying on a lump.\" \"It really bothers me.\" No alleviating factors are known. Lower back pain does not radiate. S/p a lumbar xray that showed moderate spondylosis. She broke her ribs in the past and reports pain along the rib fx site.     2. AF/HLD: She is on crestor 10mg daily. She's been out of this rx for 3 days. No CP.  On eliquis 5mg bid and metoprolol 25mg bid.  BP is 114/73.     3. IBS: She is scheduling with GI due to irregular Bms.  No bleeding hx. She is on miralax.     4. Thyroid Nodule Hx: She last had a thyroid ultrasound in August 2023.    8/12/23 Thyroid Ultrasound: Heterogeneous multinodular thyroid gland. In the right lobe, -1.0 cm length TR 4-moderately suspicious nodule for which one year follow-up recommended. Additional subcentimeter TR 4 nodules do not necessitate follow-up. In the left lobe, -1.9 cm length TR 3-mildly suspicious nodule for which one year follow-up recommended. Additional subcentimeter TR 4 nodule do not necessitate follow-up. Recommendations based on TI RADS.      Patient Active Problem List    Diagnosis Date Noted    Thyroid nodule 08/13/2023    Closed

## 2024-06-25 ENCOUNTER — HOSPITAL ENCOUNTER (OUTPATIENT)
Facility: HOSPITAL | Age: 84
Discharge: HOME | End: 2024-06-28
Attending: INTERNAL MEDICINE
Payer: MEDICARE

## 2024-06-25 DIAGNOSIS — R10.9 RIGHT FLANK PAIN: ICD-10-CM

## 2024-06-25 LAB — CREAT UR-MCNC: 1.1 MG/DL (ref 0.6–1.3)

## 2024-06-25 PROCEDURE — 82565 ASSAY OF CREATININE: CPT

## 2024-06-25 PROCEDURE — 6360000004 HC RX CONTRAST MEDICATION: Performed by: INTERNAL MEDICINE

## 2024-06-25 PROCEDURE — 74170 CT ABD WO CNTRST FLWD CNTRST: CPT

## 2024-06-25 RX ADMIN — IOPAMIDOL 100 ML: 755 INJECTION, SOLUTION INTRAVENOUS at 16:16

## 2024-06-26 ENCOUNTER — APPOINTMENT (OUTPATIENT)
Facility: HOSPITAL | Age: 84
End: 2024-06-26
Payer: MEDICARE

## 2024-07-01 ASSESSMENT — ENCOUNTER SYMPTOMS
COUGH: 0
BACK PAIN: 1
BLOOD IN STOOL: 0
SORE THROAT: 0
ANAL BLEEDING: 0
EYE PAIN: 0
ABDOMINAL PAIN: 1
SHORTNESS OF BREATH: 0

## 2024-08-14 ENCOUNTER — HOSPITAL ENCOUNTER (OUTPATIENT)
Facility: HOSPITAL | Age: 84
Discharge: HOME | End: 2024-08-17
Payer: MEDICARE

## 2024-08-14 DIAGNOSIS — E04.1 THYROID NODULE: ICD-10-CM

## 2024-08-14 PROCEDURE — 76536 US EXAM OF HEAD AND NECK: CPT

## 2024-08-19 ENCOUNTER — TELEPHONE (OUTPATIENT)
Facility: CLINIC | Age: 84
End: 2024-08-19

## 2024-08-19 RX ORDER — QUETIAPINE FUMARATE 25 MG/1
12.5 TABLET, FILM COATED ORAL
Qty: 45 TABLET | Refills: 0 | Status: SHIPPED | OUTPATIENT
Start: 2024-08-19

## 2024-08-19 RX ORDER — QUETIAPINE FUMARATE 25 MG/1
12.5 TABLET, FILM COATED ORAL
Qty: 60 TABLET | Status: CANCELLED | OUTPATIENT
Start: 2024-08-19

## 2024-08-19 NOTE — TELEPHONE ENCOUNTER
Please confirm whether or not she is actually taking his medication.  I do not see any records confirming this was intentionally ordered for her. Is this erroneous? Was she asking about her metoprolol 25mg bid rx?    Dr. Caron Aguilar  [facility] [address], [address]  [place] [address]  Phone: [phone]  Fax: [phone]

## 2024-08-19 NOTE — TELEPHONE ENCOUNTER
Spoke w/ pt and per pt her previous provider Dr. Airam Coffey prescribed Quetiapine 25 mg tablet prior to her retiring. Pt has been taking 1/2 of a tablet nightly up until last night when she took her last pill. Per pt she was given a refill by Dr Gayatri Gayle who is a provider she seen after Dr Coffey had retired and prior to establishing care with current PCP Dr. Aguilar.

## 2024-08-19 NOTE — TELEPHONE ENCOUNTER
Pt requesting refill    Quetiapine 25 mg    [facility]    [address]  [facility]    [phone]      Pt stated she tries to refill on MyChart but system will not allow her to do so

## 2024-08-20 NOTE — TELEPHONE ENCOUNTER
Letter sent to address on file.   Patient called back and was given message, patient scheduled for 8/26/2024 .  She is also asking for the results of her ultrasound.

## 2024-08-20 NOTE — TELEPHONE ENCOUNTER
Please let her know that I am refilling her Seroquel.  Please have her scheduled for an appointment to discuss this prescription moving forward though.       Dr. Caron Aguilar  [facility] [address], [address]  [place] [address]  Phone: [phone]  Fax: [phone]

## 2024-08-26 ENCOUNTER — OFFICE VISIT (OUTPATIENT)
Facility: CLINIC | Age: 84
End: 2024-08-26
Payer: MEDICARE

## 2024-08-26 VITALS
WEIGHT: 155.2 LBS | HEIGHT: 62 IN | TEMPERATURE: 98 F | BODY MASS INDEX: 28.56 KG/M2 | RESPIRATION RATE: 17 BRPM | SYSTOLIC BLOOD PRESSURE: 131 MMHG | DIASTOLIC BLOOD PRESSURE: 75 MMHG | HEART RATE: 67 BPM | OXYGEN SATURATION: 92 %

## 2024-08-26 DIAGNOSIS — R10.13 EPIGASTRIC PAIN: ICD-10-CM

## 2024-08-26 DIAGNOSIS — R10.9 RIGHT FLANK PAIN: ICD-10-CM

## 2024-08-26 DIAGNOSIS — K55.1 SUPERIOR MESENTERIC ARTERY STENOSIS (HCC): ICD-10-CM

## 2024-08-26 DIAGNOSIS — E04.1 THYROID NODULE: ICD-10-CM

## 2024-08-26 DIAGNOSIS — I48.0 PAF (PAROXYSMAL ATRIAL FIBRILLATION) (HCC): ICD-10-CM

## 2024-08-26 DIAGNOSIS — I70.1 RENAL ARTERY STENOSIS (HCC): Primary | ICD-10-CM

## 2024-08-26 DIAGNOSIS — K59.09 CHRONIC CONSTIPATION: ICD-10-CM

## 2024-08-26 DIAGNOSIS — J30.9 ALLERGIC RHINITIS, UNSPECIFIED SEASONALITY, UNSPECIFIED TRIGGER: ICD-10-CM

## 2024-08-26 PROCEDURE — 3075F SYST BP GE 130 - 139MM HG: CPT | Performed by: INTERNAL MEDICINE

## 2024-08-26 PROCEDURE — G8419 CALC BMI OUT NRM PARAM NOF/U: HCPCS | Performed by: INTERNAL MEDICINE

## 2024-08-26 PROCEDURE — G8399 PT W/DXA RESULTS DOCUMENT: HCPCS | Performed by: INTERNAL MEDICINE

## 2024-08-26 PROCEDURE — 1090F PRES/ABSN URINE INCON ASSESS: CPT | Performed by: INTERNAL MEDICINE

## 2024-08-26 PROCEDURE — 99214 OFFICE O/P EST MOD 30 MIN: CPT | Performed by: INTERNAL MEDICINE

## 2024-08-26 PROCEDURE — G8427 DOCREV CUR MEDS BY ELIG CLIN: HCPCS | Performed by: INTERNAL MEDICINE

## 2024-08-26 PROCEDURE — 1123F ACP DISCUSS/DSCN MKR DOCD: CPT | Performed by: INTERNAL MEDICINE

## 2024-08-26 PROCEDURE — 3078F DIAST BP <80 MM HG: CPT | Performed by: INTERNAL MEDICINE

## 2024-08-26 PROCEDURE — 1036F TOBACCO NON-USER: CPT | Performed by: INTERNAL MEDICINE

## 2024-08-26 RX ORDER — FLUTICASONE PROPIONATE 50 MCG
1 SPRAY, SUSPENSION (ML) NASAL DAILY
Qty: 32 G | Refills: 0 | Status: SHIPPED | OUTPATIENT
Start: 2024-08-26

## 2024-08-26 RX ORDER — LORATADINE 10 MG/1
10 TABLET ORAL DAILY
Qty: 30 TABLET | Refills: 0 | Status: SHIPPED | OUTPATIENT
Start: 2024-08-26 | End: 2024-09-25

## 2024-08-26 ASSESSMENT — ENCOUNTER SYMPTOMS
SHORTNESS OF BREATH: 0
CONSTIPATION: 1
ABDOMINAL PAIN: 1
BLOOD IN STOOL: 0
COUGH: 0
ANAL BLEEDING: 0
SORE THROAT: 0
EYE PAIN: 0

## 2024-08-26 NOTE — PROGRESS NOTES
[facility] [place]:  8/26/2024, MRN: [id number]      Minine Brasher is a 84 y.o. female and presents to clinic for Discuss Medications      Subjective:   The pt is an 85 yo female with a h/o HTN, AF, CAD, fracture, HLD, vitamin D deficiency, left breast cancer status post lumpectomy, HLD, rib fractures, diverticulosis (per imaging), cervical facet arthropathy, PMR, cervical disc disease, anxiety, hearing loss, prediabetes, thyroid nodule hx (followed by ENT), right rotator cuff tendinopathy.      1.  Thyroid nodule history and Drainage/Allergic Rhinitis: S/p a thyroid ultrasound. She reports trouble with clearing her throat all of the time. Taking prilosec 20mg daily. She denies h/o GERD and allergies. +Drainage.\"I think I have mold in my home.\" No dysphagia.     8/14/24 Thyroid Ultrasound: Stable left middle lobe TR 3 nodule measures 2.0 x 1.6 x 1.7 cm on today's exam. Recommend follow-up ultrasound in 2 years based on size. Previously seen right middle lobe TR 4 nodule is smaller compared to prior now measuring 0.4 x 0.4 x 0.6 cm, no longer meeting size criteria for continued monitoring. Multiple subcentimeter lesions bilaterally which do not need further follow-up or characterization.    8/12/23 Thyroid Ultrasound: Heterogeneous multinodular thyroid gland. In the right lobe, -1.0 cm length TR 4-moderately suspicious nodule for which one year follow-up recommended. Additional subcentimeter TR 4 nodules do not necessitate follow-up. In the left lobe, -1.9 cm length TR 3-mildly suspicious nodule for which one year follow-up recommended. Additional subcentimeter TR 4 nodule do not necessitate follow-up. Recommendations based on TI RADS.    2.  Right rib/flank pain: Reported at her last appointment.  At that time, I ordered a chest x-ray and abdominal CT study.  Findings are listed below.  Today she reports: her right side pain is \"a lot better; I barely notice it.\"      6/25/24 Abdomen CT: Remote right  metoprolol succinate (TOPROL XL) 25 MG extended release tablet Take 1 tablet by mouth in the morning and 1 tablet in the evening. 180 tablet 3    predniSONE (DELTASONE) 5 MG tablet Take 1 tablet by mouth daily 90 tablet 1    apixaban (ELIQUIS) 5 MG TABS tablet Take 1 tablet by mouth 2 times daily 180 tablet 3     No current facility-administered medications for this visit.       Allergies   Allergen Reactions    Latex      Other reaction(s): Unable to Obtain  To tape with latex    Hydrocodone-Acetaminophen Swelling     Patient experienced throat swelling, hoarseness and difficulty swallowing.   Other reaction(s): wheezing/sob    Adhesive Tape Hives     Other reaction(s): rash/itching    Miralax [Polyethylene Glycol] Nausea Only    Codeine Nausea Only     Pt states she is not allergic.  Other reaction(s): gi distress  Nausea    Cymbalta [Duloxetine Hcl] Nausea Only       Past Medical History:   Diagnosis Date    Anxiety     Ataxic gait 10/21/2010    Breast cancer ([facility]) 05/2014    left side    CAD (coronary artery disease) 10/2015    Diverticulosis     GERD (gastroesophageal reflux disease)     Herpes     Hypercholesterolemia     Hypertension     Osteopenia     PAF (paroxysmal atrial fibrillation) ([facility]) 06/07/2019    Diagnosed on 30-day event monitor    Rheumatoid arthritis ([facility])     S/P CABG x 3 10/2015    LIMA to LAD, SVG to OM, SVG to distal RCA    S/P partial mastectomy        Past Surgical History:   Procedure Laterality Date    BACK SURGERY  1980    BREAST BIOPSY      right (2000), left (2011)    COLONOSCOPY  09/2008    HYSTERECTOMY (CERVIX STATUS UNKNOWN)  10/2009    complete, fibroid    LAP, INCISIONAL HERNIA REPAIR,REDUCIBLE N/A 01/19/2017    Dr. Alarcon    MASTECTOMY  6./8/11    partial left with sentinel lymph node biopsy    MASTECTOMY Left 9/10/2014    LEFT PARTIAL MASTECTOMY performed by Nico Lozano MD at [facility] MAIN OR    ORTHOPEDIC SURGERY Right 7/2014    ROTATOR CUFF    ORTHOPEDIC SURGERY      left

## 2024-08-30 ENCOUNTER — PATIENT MESSAGE (OUTPATIENT)
Facility: CLINIC | Age: 84
End: 2024-08-30

## 2024-09-08 ENCOUNTER — PATIENT MESSAGE (OUTPATIENT)
Facility: CLINIC | Age: 84
End: 2024-09-08

## 2024-09-19 ENCOUNTER — OFFICE VISIT (OUTPATIENT)
Age: 84
End: 2024-09-19
Payer: MEDICARE

## 2024-09-19 VITALS
WEIGHT: 150 LBS | DIASTOLIC BLOOD PRESSURE: 80 MMHG | OXYGEN SATURATION: 97 % | BODY MASS INDEX: 27.6 KG/M2 | HEART RATE: 131 BPM | HEIGHT: 62 IN | SYSTOLIC BLOOD PRESSURE: 120 MMHG

## 2024-09-19 DIAGNOSIS — K55.1 SUPERIOR MESENTERIC ARTERY STENOSIS (HCC): ICD-10-CM

## 2024-09-19 DIAGNOSIS — N63.23 BREAST LUMP ON LEFT SIDE AT 5 O'CLOCK POSITION: ICD-10-CM

## 2024-09-19 DIAGNOSIS — K59.09 OTHER CONSTIPATION: ICD-10-CM

## 2024-09-19 DIAGNOSIS — I70.1 RENAL ARTERY STENOSIS (HCC): Primary | ICD-10-CM

## 2024-09-19 PROCEDURE — 99203 OFFICE O/P NEW LOW 30 MIN: CPT | Performed by: SURGERY

## 2024-09-19 PROCEDURE — 3079F DIAST BP 80-89 MM HG: CPT | Performed by: SURGERY

## 2024-09-19 PROCEDURE — 1036F TOBACCO NON-USER: CPT | Performed by: SURGERY

## 2024-09-19 PROCEDURE — G8419 CALC BMI OUT NRM PARAM NOF/U: HCPCS | Performed by: SURGERY

## 2024-09-19 PROCEDURE — 1090F PRES/ABSN URINE INCON ASSESS: CPT | Performed by: SURGERY

## 2024-09-19 PROCEDURE — 3074F SYST BP LT 130 MM HG: CPT | Performed by: SURGERY

## 2024-09-19 PROCEDURE — 1123F ACP DISCUSS/DSCN MKR DOCD: CPT | Performed by: SURGERY

## 2024-09-19 PROCEDURE — G8427 DOCREV CUR MEDS BY ELIG CLIN: HCPCS | Performed by: SURGERY

## 2024-09-19 PROCEDURE — G8399 PT W/DXA RESULTS DOCUMENT: HCPCS | Performed by: SURGERY

## 2024-10-04 ENCOUNTER — OFFICE VISIT (OUTPATIENT)
Age: 84
End: 2024-10-04
Payer: MEDICARE

## 2024-10-04 VITALS
SYSTOLIC BLOOD PRESSURE: 118 MMHG | DIASTOLIC BLOOD PRESSURE: 80 MMHG | BODY MASS INDEX: 27.97 KG/M2 | WEIGHT: 152 LBS | OXYGEN SATURATION: 99 % | HEIGHT: 62 IN | HEART RATE: 72 BPM

## 2024-10-04 DIAGNOSIS — I25.83 CORONARY ARTERY DISEASE DUE TO LIPID RICH PLAQUE: ICD-10-CM

## 2024-10-04 DIAGNOSIS — I25.10 CORONARY ARTERY DISEASE DUE TO LIPID RICH PLAQUE: ICD-10-CM

## 2024-10-04 DIAGNOSIS — E78.00 PURE HYPERCHOLESTEROLEMIA: ICD-10-CM

## 2024-10-04 DIAGNOSIS — I48.0 PAF (PAROXYSMAL ATRIAL FIBRILLATION) (HCC): ICD-10-CM

## 2024-10-04 DIAGNOSIS — I10 ESSENTIAL HYPERTENSION WITH GOAL BLOOD PRESSURE LESS THAN 140/90: Primary | ICD-10-CM

## 2024-10-04 PROCEDURE — 1090F PRES/ABSN URINE INCON ASSESS: CPT | Performed by: INTERNAL MEDICINE

## 2024-10-04 PROCEDURE — 3074F SYST BP LT 130 MM HG: CPT | Performed by: INTERNAL MEDICINE

## 2024-10-04 PROCEDURE — 99214 OFFICE O/P EST MOD 30 MIN: CPT | Performed by: INTERNAL MEDICINE

## 2024-10-04 PROCEDURE — 1123F ACP DISCUSS/DSCN MKR DOCD: CPT | Performed by: INTERNAL MEDICINE

## 2024-10-04 PROCEDURE — G8484 FLU IMMUNIZE NO ADMIN: HCPCS | Performed by: INTERNAL MEDICINE

## 2024-10-04 PROCEDURE — G8399 PT W/DXA RESULTS DOCUMENT: HCPCS | Performed by: INTERNAL MEDICINE

## 2024-10-04 PROCEDURE — 1036F TOBACCO NON-USER: CPT | Performed by: INTERNAL MEDICINE

## 2024-10-04 PROCEDURE — G8419 CALC BMI OUT NRM PARAM NOF/U: HCPCS | Performed by: INTERNAL MEDICINE

## 2024-10-04 PROCEDURE — 3079F DIAST BP 80-89 MM HG: CPT | Performed by: INTERNAL MEDICINE

## 2024-10-04 PROCEDURE — G8428 CUR MEDS NOT DOCUMENT: HCPCS | Performed by: INTERNAL MEDICINE

## 2024-10-04 RX ORDER — ASPIRIN 81 MG/1
81 TABLET ORAL DAILY
Qty: 90 TABLET | Refills: 2 | Status: SHIPPED | OUTPATIENT
Start: 2024-10-04

## 2024-10-04 NOTE — PROGRESS NOTES
(NOTE)  HbA1C Interpretive Ranges  <5.7              Normal  5.7 - 6.4         Consider Prediabetes  >6.5              Consider Diabetes       Lab Results   Component Value Date    TSH 1.61 09/18/2023       NM STRESS TEST WITH MYOCARDIAL PERFUSION 07/17/2023 12:15 PM (Final)  Interpretation Summary    Perfusion Comments: LV perfusion is abnormal.    Stress Combined Conclusion: Findings suggest a low risk of myocardial ischemia.    Perfusion Defect: There is a mild severity left ventricular stress perfusion defect that is small in size present in the basal inferior segment(s) that is predominantly fixed. There is abnormal wall motion in the defect area. The defect appears to be probable infarction.    Perfusion Conclusion: TID ratio is 1.03.    ECG: Resting ECG demonstrates normal sinus rhythm.    ECG: The ECG was negative for ischemia.    Stress Test: A pharmacological stress test was performed using lexiscan. Hemodynamics are suboptimal due to medication. Blood pressure demonstrated a normal response and heart rate demonstrated a normal response to stress. The patient's heart rate recovery was normal.    IMPRESSION & PLAN:  Minnie Brasher  is 84 y.o. female with    Paroxysmal atrial fibrillation.   Event monitor in 2019 with several A-fib episode  Chest vascular score of at least 3  Event monitor 03/20245: Multiple short episodes of atrial flutter.  Longest episode lasting for 32 minutes.  Total atrial flutter/fibrillation burden 7%.  Average heart rate while in arrhythmia 92 bpm, range .    Currently on Toprol and apixaban.  She would like to take of apixaban.  Discussed with the patient risk of stroke.  Discussed alternative management strategy.  She would like to at least reduce the dose.  After lengthy discussion, we have decided to reduce dose to 2.5 mg twice daily and start patient on aspirin 81 g daily for CAD    CAD:  CABG x 3 with LIMA to LAD, SVG to OM and SVG to RCA in 2015  NST in 07/2023, low

## 2024-10-08 ENCOUNTER — HOSPITAL ENCOUNTER (OUTPATIENT)
Facility: HOSPITAL | Age: 84
Setting detail: SPECIMEN
Discharge: HOME | End: 2024-10-11
Payer: MEDICARE

## 2024-10-08 DIAGNOSIS — R73.9 HYPERGLYCEMIA: ICD-10-CM

## 2024-10-08 DIAGNOSIS — E04.1 THYROID NODULE: ICD-10-CM

## 2024-10-08 DIAGNOSIS — I48.0 PAF (PAROXYSMAL ATRIAL FIBRILLATION) (HCC): ICD-10-CM

## 2024-10-08 DIAGNOSIS — M35.3 PMR (POLYMYALGIA RHEUMATICA) (HCC): ICD-10-CM

## 2024-10-08 LAB
ALBUMIN SERPL-MCNC: 4 G/DL (ref 3.4–5)
ALBUMIN/GLOB SERPL: 1.5 (ref 0.8–1.7)
ALP SERPL-CCNC: 56 U/L (ref 45–117)
ALT SERPL-CCNC: 21 U/L (ref 13–56)
ANION GAP SERPL CALC-SCNC: 5 MMOL/L (ref 3–18)
AST SERPL-CCNC: 24 U/L (ref 10–38)
BASOPHILS # BLD: 0 K/UL (ref 0–0.1)
BASOPHILS NFR BLD: 0 % (ref 0–2)
BILIRUB SERPL-MCNC: 0.6 MG/DL (ref 0.2–1)
BUN SERPL-MCNC: 23 MG/DL (ref 7–18)
BUN/CREAT SERPL: 22 (ref 12–20)
CALCIUM SERPL-MCNC: 9.3 MG/DL (ref 8.5–10.1)
CHLORIDE SERPL-SCNC: 106 MMOL/L (ref 100–111)
CO2 SERPL-SCNC: 27 MMOL/L (ref 21–32)
CREAT SERPL-MCNC: 1.06 MG/DL (ref 0.6–1.3)
CRP SERPL-MCNC: <0.3 MG/DL (ref 0–0.3)
DIFFERENTIAL METHOD BLD: ABNORMAL
EOSINOPHIL # BLD: 0.2 K/UL (ref 0–0.4)
EOSINOPHIL NFR BLD: 2 % (ref 0–5)
ERYTHROCYTE [DISTWIDTH] IN BLOOD BY AUTOMATED COUNT: 14.9 % (ref 11.6–14.5)
EST. AVERAGE GLUCOSE BLD GHB EST-MCNC: 123 MG/DL
GLOBULIN SER CALC-MCNC: 2.6 G/DL (ref 2–4)
GLUCOSE SERPL-MCNC: 102 MG/DL (ref 74–99)
HBA1C MFR BLD: 5.9 % (ref 4.2–5.6)
HCT VFR BLD AUTO: 49.5 % (ref 35–45)
HGB BLD-MCNC: 15.4 G/DL (ref 12–16)
IMM GRANULOCYTES # BLD AUTO: 0.1 K/UL (ref 0–0.04)
IMM GRANULOCYTES NFR BLD AUTO: 1 % (ref 0–0.5)
LYMPHOCYTES # BLD: 2.7 K/UL (ref 0.9–3.6)
LYMPHOCYTES NFR BLD: 29 % (ref 21–52)
MCH RBC QN AUTO: 29.9 PG (ref 24–34)
MCHC RBC AUTO-ENTMCNC: 31.1 G/DL (ref 31–37)
MCV RBC AUTO: 96.1 FL (ref 78–100)
MONOCYTES # BLD: 0.8 K/UL (ref 0.05–1.2)
MONOCYTES NFR BLD: 8 % (ref 3–10)
NEUTS SEG # BLD: 5.5 K/UL (ref 1.8–8)
NEUTS SEG NFR BLD: 60 % (ref 40–73)
NRBC # BLD: 0 K/UL (ref 0–0.01)
NRBC BLD-RTO: 0 PER 100 WBC
PLATELET # BLD AUTO: 253 K/UL (ref 135–420)
PMV BLD AUTO: 9.2 FL (ref 9.2–11.8)
POTASSIUM SERPL-SCNC: 4.6 MMOL/L (ref 3.5–5.5)
PROT SERPL-MCNC: 6.6 G/DL (ref 6.4–8.2)
RBC # BLD AUTO: 5.15 M/UL (ref 4.2–5.3)
SODIUM SERPL-SCNC: 138 MMOL/L (ref 136–145)
T4 FREE SERPL-MCNC: 1.1 NG/DL (ref 0.7–1.5)
TSH SERPL DL<=0.05 MIU/L-ACNC: 1.8 UIU/ML (ref 0.36–3.74)
WBC # BLD AUTO: 9.2 K/UL (ref 4.6–13.2)

## 2024-10-08 PROCEDURE — 80053 COMPREHEN METABOLIC PANEL: CPT

## 2024-10-08 PROCEDURE — 86140 C-REACTIVE PROTEIN: CPT

## 2024-10-08 PROCEDURE — 84439 ASSAY OF FREE THYROXINE: CPT

## 2024-10-08 PROCEDURE — 84443 ASSAY THYROID STIM HORMONE: CPT

## 2024-10-08 PROCEDURE — 36415 COLL VENOUS BLD VENIPUNCTURE: CPT

## 2024-10-08 PROCEDURE — 85025 COMPLETE CBC W/AUTO DIFF WBC: CPT

## 2024-10-08 PROCEDURE — 83036 HEMOGLOBIN GLYCOSYLATED A1C: CPT

## 2024-10-15 ENCOUNTER — OFFICE VISIT (OUTPATIENT)
Facility: CLINIC | Age: 84
End: 2024-10-15

## 2024-10-15 VITALS
HEART RATE: 94 BPM | OXYGEN SATURATION: 96 % | BODY MASS INDEX: 28.37 KG/M2 | WEIGHT: 154.2 LBS | TEMPERATURE: 98.2 F | DIASTOLIC BLOOD PRESSURE: 65 MMHG | SYSTOLIC BLOOD PRESSURE: 112 MMHG | RESPIRATION RATE: 17 BRPM | HEIGHT: 62 IN

## 2024-10-15 DIAGNOSIS — J30.9 ALLERGIC RHINITIS, UNSPECIFIED SEASONALITY, UNSPECIFIED TRIGGER: ICD-10-CM

## 2024-10-15 DIAGNOSIS — R10.13 EPIGASTRIC PAIN: ICD-10-CM

## 2024-10-15 DIAGNOSIS — I70.1 RENAL ARTERY STENOSIS (HCC): ICD-10-CM

## 2024-10-15 DIAGNOSIS — Z00.00 MEDICARE ANNUAL WELLNESS VISIT, SUBSEQUENT: ICD-10-CM

## 2024-10-15 DIAGNOSIS — I10 HYPERTENSION, UNSPECIFIED TYPE: ICD-10-CM

## 2024-10-15 DIAGNOSIS — I48.0 PAF (PAROXYSMAL ATRIAL FIBRILLATION) (HCC): ICD-10-CM

## 2024-10-15 DIAGNOSIS — R73.9 HYPERGLYCEMIA: Primary | ICD-10-CM

## 2024-10-15 DIAGNOSIS — Z71.89 ADVANCE CARE PLANNING: ICD-10-CM

## 2024-10-15 DIAGNOSIS — Z23 NEED FOR VACCINATION: ICD-10-CM

## 2024-10-15 PROBLEM — N18.31 CHRONIC KIDNEY DISEASE, STAGE 3A (HCC): Status: ACTIVE | Noted: 2024-10-15

## 2024-10-15 ASSESSMENT — PATIENT HEALTH QUESTIONNAIRE - PHQ9
SUM OF ALL RESPONSES TO PHQ QUESTIONS 1-9: 0
SUM OF ALL RESPONSES TO PHQ9 QUESTIONS 1 & 2: 0
1. LITTLE INTEREST OR PLEASURE IN DOING THINGS: NOT AT ALL
SUM OF ALL RESPONSES TO PHQ QUESTIONS 1-9: 0
2. FEELING DOWN, DEPRESSED OR HOPELESS: NOT AT ALL
SUM OF ALL RESPONSES TO PHQ QUESTIONS 1-9: 0
SUM OF ALL RESPONSES TO PHQ QUESTIONS 1-9: 0

## 2024-10-15 NOTE — PROGRESS NOTES
Minnie Brasher presents today for   Chief Complaint   Patient presents with    Medicare AWV    Follow-up       BP recheck: 112/65     \"Have you been to the ER, urgent care clinic since your last visit?  Hospitalized since your last visit?\"    NO    “Have you seen or consulted any other health care providers outside of [facility] since your last visit?”    NO

## 2024-10-15 NOTE — PROGRESS NOTES
INTERNISTS OF [facility]:  10/30/2024, MRN: [id number]      Minnie Brasher is a 84 y.o. female and presents to clinic for Medicare AWV and Follow-up      Subjective:   The pt is an 85 yo female with a h/o HTN, AF, CAD, fracture, HLD, vitamin D deficiency, left breast cancer status post lumpectomy, HLD, rib fractures, diverticulosis (per imaging), cervical facet arthropathy, PMR, cervical disc disease, anxiety, hearing loss, prediabetes, hiatal hernia, umbilical hernia, IBS-C, diverticulosis, thyroid nodule hx (followed by ENT), right rotator cuff tendinopathy.      1.  Renal artery stenosis and SMA stenosis: These were incidental findings seen on imaging.  At her last visit I referred her to vascular surgery.  Today she reports: she met with them. I reviewed their note. No abdominal pain today.     9/23/24 Vascular Surgery Note: \" 84 y.o. female with incidentally noted renal artery stenosis and superior mesenteric artery stenosis on CT scan of the abdomen and pelvis performed for nonspecific epigastric pain.  She has a small hiatal hernia, and is currently on omeprazole. Her blood pressures are controlled, and she does not have renovascular hypertension.  Her creatinine / renal function is normal.  She does not have any signs or symptoms  of acute or chronic mesenteric ischemia. She is on Eliquis for atrial fibrillation History of breast cancer-newly diagnosed right breast lump. Follow-up with PCP, for referral to breast surgeon. No further vascular follow-up is required.\"    2.  PAF and Hyperglycemia/Prediabetes: Treated with Eliquis 2.5 mg (reduced at her last Cardiology apt) twice daily and metoprolol 25 mg twice daily.  Asymptomatic. Blood pressure is 112/65.  Heart rate is 94.  Her recent lab work shows no new findings with her CMP.  Her A1c improved to 5.9. +Chronic SOB is unchanged. No CP.     10/4/24 Cardiology Note: \"Currently on Toprol and apixaban.  She would like to take of apixaban.  Discussed with the

## 2024-10-15 NOTE — PROGRESS NOTES
Minnie OAKLEY Sameera 1940 female who presents for routine immunizations.   Patient denies any symptoms , reactions or allergies that would exclude them from being immunized today.  Risks and adverse reactions were discussed and the VIS was given to them. All questions were addressed.  Order placed for Fluad and PCV 20,  per Verbal Order from Dr. NISHA Aguilar MD with read back.  There were no reactions observed.    Naa Womack LPN

## 2024-10-30 ASSESSMENT — ENCOUNTER SYMPTOMS
ANAL BLEEDING: 0
COUGH: 0
EYE PAIN: 0
ABDOMINAL PAIN: 1
SORE THROAT: 0
BLOOD IN STOOL: 0
SHORTNESS OF BREATH: 0

## 2024-10-30 NOTE — ACP (ADVANCE CARE PLANNING)
Advance Care Planning     General Advance Care Planning (ACP) Conversation    Date of Conversation: 10/15/24    Conducted with: Patient with Decision Making Capacity    Healthcare Decision Maker:  Today we discussed Healthcare Decision Makers. The patient is considering options.    Content/Action Overview:  Has NO ACP documents-Information provided    I encouraged her to complete her advance directive. Information on advance care planning was provided to her.      Length of Voluntary ACP Conversation in minutes:  <16 minutes (Non-Billable)    Caron Aguilar MD

## 2024-11-12 ENCOUNTER — TELEPHONE (OUTPATIENT)
Facility: CLINIC | Age: 84
End: 2024-11-12

## 2024-11-12 NOTE — TELEPHONE ENCOUNTER
PCP: Caron Aguilar MD    LAST OFFICE VISIT: 10/15/2024    LAST REFILL PER CHART: 8/19/2024           Future Appointments   Date Time Provider Department Center   3/13/2025 11:30 AM Victoria Cannon PA-C [facility] BS [facility]   4/8/2025  9:15 AM IOC LAB VISIT [facility] DEP   4/15/2025  1:40 PM Caron Aguilar MD [facility] DEP

## 2024-11-13 RX ORDER — QUETIAPINE FUMARATE 25 MG/1
12.5 TABLET, FILM COATED ORAL
Qty: 45 TABLET | Refills: 3 | Status: SHIPPED | OUTPATIENT
Start: 2024-11-13

## 2024-11-22 ENCOUNTER — TELEPHONE (OUTPATIENT)
Facility: CLINIC | Age: 84
End: 2024-11-22

## 2024-11-22 DIAGNOSIS — M35.3 PMR (POLYMYALGIA RHEUMATICA) (HCC): ICD-10-CM

## 2024-11-22 RX ORDER — PREDNISONE 5 MG/1
5 TABLET ORAL DAILY
Qty: 90 TABLET | Refills: 1 | Status: SHIPPED | OUTPATIENT
Start: 2024-11-22

## 2024-11-22 NOTE — TELEPHONE ENCOUNTER
Refill request via fax     Medication:   predniSONE   Quantity: 90  Pharmacy:   [id number] [place], [place] - [facility] -     Last Fill: 3/11/2024    PCP: Caron Aguilar MD    LAST OFFICE VISIT: 10/15/2024      Future Appointments   Date Time Provider Department Center   3/13/2025 11:30 AM Victoria Cannon PA-C [facility] BS [facility]   4/8/2025  9:15 AM IOC LAB VISIT [facility] DEP   4/15/2025  1:40 PM Caron Aguilar MD [facility] DEP

## 2025-03-06 ENCOUNTER — OFFICE VISIT (OUTPATIENT)
Age: 85
End: 2025-03-06
Payer: MEDICARE

## 2025-03-06 VITALS
SYSTOLIC BLOOD PRESSURE: 150 MMHG | HEIGHT: 62 IN | OXYGEN SATURATION: 94 % | BODY MASS INDEX: 28.05 KG/M2 | HEART RATE: 83 BPM | WEIGHT: 152.4 LBS | DIASTOLIC BLOOD PRESSURE: 80 MMHG

## 2025-03-06 DIAGNOSIS — I48.0 PAROXYSMAL ATRIAL FIBRILLATION (HCC): ICD-10-CM

## 2025-03-06 DIAGNOSIS — I25.10 CORONARY ARTERY DISEASE INVOLVING NATIVE HEART, UNSPECIFIED VESSEL OR LESION TYPE, UNSPECIFIED WHETHER ANGINA PRESENT: Primary | ICD-10-CM

## 2025-03-06 DIAGNOSIS — I48.0 PAF (PAROXYSMAL ATRIAL FIBRILLATION) (HCC): ICD-10-CM

## 2025-03-06 PROCEDURE — 1090F PRES/ABSN URINE INCON ASSESS: CPT | Performed by: PHYSICIAN ASSISTANT

## 2025-03-06 PROCEDURE — G8427 DOCREV CUR MEDS BY ELIG CLIN: HCPCS | Performed by: PHYSICIAN ASSISTANT

## 2025-03-06 PROCEDURE — G8419 CALC BMI OUT NRM PARAM NOF/U: HCPCS | Performed by: PHYSICIAN ASSISTANT

## 2025-03-06 PROCEDURE — 3077F SYST BP >= 140 MM HG: CPT | Performed by: PHYSICIAN ASSISTANT

## 2025-03-06 PROCEDURE — G8399 PT W/DXA RESULTS DOCUMENT: HCPCS | Performed by: PHYSICIAN ASSISTANT

## 2025-03-06 PROCEDURE — 99214 OFFICE O/P EST MOD 30 MIN: CPT | Performed by: PHYSICIAN ASSISTANT

## 2025-03-06 PROCEDURE — 1123F ACP DISCUSS/DSCN MKR DOCD: CPT | Performed by: PHYSICIAN ASSISTANT

## 2025-03-06 PROCEDURE — 3079F DIAST BP 80-89 MM HG: CPT | Performed by: PHYSICIAN ASSISTANT

## 2025-03-06 PROCEDURE — 1036F TOBACCO NON-USER: CPT | Performed by: PHYSICIAN ASSISTANT

## 2025-03-06 PROCEDURE — 1159F MED LIST DOCD IN RCRD: CPT | Performed by: PHYSICIAN ASSISTANT

## 2025-03-06 RX ORDER — TENAPANOR HYDROCHLORIDE 53.2 MG/1
50 TABLET ORAL 2 TIMES DAILY WITH MEALS
COMMUNITY
Start: 2024-12-27

## 2025-03-06 NOTE — PROGRESS NOTES
Smoking status: Never    Smokeless tobacco: Never   Vaping Use    Vaping status: Never Used   Substance Use Topics    Alcohol use: No    Drug use: No     She  reports that she has never smoked. She has never used smokeless tobacco.  She  reports no history of alcohol use.    Physical Exam:  BP Readings from Last 3 Encounters:   03/06/25 (!) 150/80   10/15/24 112/65   10/04/24 118/80         Pulse Readings from Last 3 Encounters:   03/06/25 83   10/15/24 94   10/04/24 72          Wt Readings from Last 3 Encounters:   03/06/25 69.1 kg (152 lb 6.4 oz)   10/15/24 69.9 kg (154 lb 3.2 oz)   10/04/24 68.9 kg (152 lb)       Constitutional: Oriented to person, place, and time.   HENT: Head: Normocephalic and atraumatic.  Neck: No JVD present. Carotid bruit is not appreciated.   Cardiovascular: Irregular rhythm.   No murmur, gallop or rubs appreciated  Lung: Breath sounds normal. No respiratory distress. No ronchi or rales appreciated  Abdominal: No tenderness. No rebound and no guarding.       LABS:   @  Lab Results   Component Value Date/Time    WBC 9.2 10/08/2024 11:12 AM    HGB 15.4 10/08/2024 11:12 AM    HCT 49.5 10/08/2024 11:12 AM     10/08/2024 11:12 AM    MCV 96.1 10/08/2024 11:12 AM     Lab Results   Component Value Date/Time     10/08/2024 11:12 AM    K 4.6 10/08/2024 11:12 AM     10/08/2024 11:12 AM    CO2 27 10/08/2024 11:12 AM    BUN 23 10/08/2024 11:12 AM    CREATININE 1.06 10/08/2024 11:12 AM    GLUCOSE 102 10/08/2024 11:12 AM    CALCIUM 9.3 10/08/2024 11:12 AM           Latest Ref Rng & Units 10/8/2024    11:12 AM 4/8/2024    10:34 AM 12/8/2023     3:05 PM 9/18/2023     1:00 PM   Lipids   Chol <200 MG/DL  173   163    HDL 40 - 60 MG/DL  59   45    LDL Calc 0 - 100 MG/DL  90.6   60.8    VLDL Calc MG/DL  23.4   57.2    Trig <150 MG/DL  117   286    Ratio 0 - 5.0  2.9   3.6    ALT 13 - 56 U/L 21   20  23    AST 10 - 38 U/L 24   24  22    LDL 0 - 100 MG/DL  90.6   60.8      Lab Results

## 2025-04-08 ENCOUNTER — HOSPITAL ENCOUNTER (OUTPATIENT)
Facility: HOSPITAL | Age: 85
Setting detail: SPECIMEN
Discharge: HOME | End: 2025-04-11
Payer: MEDICARE

## 2025-04-08 DIAGNOSIS — R73.9 HYPERGLYCEMIA: ICD-10-CM

## 2025-04-08 DIAGNOSIS — J30.9 ALLERGIC RHINITIS, UNSPECIFIED SEASONALITY, UNSPECIFIED TRIGGER: ICD-10-CM

## 2025-04-08 DIAGNOSIS — I10 HYPERTENSION, UNSPECIFIED TYPE: ICD-10-CM

## 2025-04-08 DIAGNOSIS — R10.13 EPIGASTRIC PAIN: ICD-10-CM

## 2025-04-08 DIAGNOSIS — I48.0 PAF (PAROXYSMAL ATRIAL FIBRILLATION) (HCC): ICD-10-CM

## 2025-04-08 LAB
ALBUMIN SERPL-MCNC: 3.9 G/DL (ref 3.4–5)
ALBUMIN/GLOB SERPL: 1.2 (ref 0.8–1.7)
ALP SERPL-CCNC: 157 U/L (ref 45–117)
ALT SERPL-CCNC: 28 U/L (ref 13–56)
ANION GAP SERPL CALC-SCNC: 9 MMOL/L (ref 3–18)
AST SERPL-CCNC: 30 U/L (ref 10–38)
BASOPHILS # BLD: 0.03 K/UL (ref 0–0.1)
BASOPHILS NFR BLD: 0.3 % (ref 0–2)
BILIRUB SERPL-MCNC: 0.9 MG/DL (ref 0.2–1)
BUN SERPL-MCNC: 20 MG/DL (ref 7–18)
BUN/CREAT SERPL: 19 (ref 12–20)
CALCIUM SERPL-MCNC: 9.6 MG/DL (ref 8.5–10.1)
CHLORIDE SERPL-SCNC: 105 MMOL/L (ref 100–111)
CHOLEST SERPL-MCNC: 167 MG/DL
CO2 SERPL-SCNC: 25 MMOL/L (ref 21–32)
CREAT SERPL-MCNC: 1.07 MG/DL (ref 0.6–1.3)
CREAT UR-MCNC: 43 MG/DL (ref 30–125)
DIFFERENTIAL METHOD BLD: ABNORMAL
EOSINOPHIL # BLD: 0.25 K/UL (ref 0–0.4)
EOSINOPHIL NFR BLD: 2.9 % (ref 0–5)
ERYTHROCYTE [DISTWIDTH] IN BLOOD BY AUTOMATED COUNT: 15.7 % (ref 11.6–14.5)
EST. AVERAGE GLUCOSE BLD GHB EST-MCNC: 123 MG/DL
GLOBULIN SER CALC-MCNC: 3.2 G/DL (ref 2–4)
GLUCOSE SERPL-MCNC: 101 MG/DL (ref 74–99)
HBA1C MFR BLD: 5.9 % (ref 4.2–5.6)
HCT VFR BLD AUTO: 51.6 % (ref 35–45)
HDLC SERPL-MCNC: 69 MG/DL (ref 40–60)
HDLC SERPL: 2.4 (ref 0–5)
HGB BLD-MCNC: 16 G/DL (ref 12–16)
IMM GRANULOCYTES # BLD AUTO: 0.03 K/UL (ref 0–0.04)
IMM GRANULOCYTES NFR BLD AUTO: 0.3 % (ref 0–0.5)
LDLC SERPL CALC-MCNC: 63 MG/DL (ref 0–100)
LIPID PANEL: ABNORMAL
LYMPHOCYTES # BLD: 3.48 K/UL (ref 0.9–3.6)
LYMPHOCYTES NFR BLD: 40 % (ref 21–52)
MCH RBC QN AUTO: 30.1 PG (ref 24–34)
MCHC RBC AUTO-ENTMCNC: 31 G/DL (ref 31–37)
MCV RBC AUTO: 97.2 FL (ref 78–100)
MICROALBUMIN UR-MCNC: 21.1 MG/DL (ref 0–3)
MICROALBUMIN/CREAT UR-RTO: 491 MG/G (ref 0–30)
MONOCYTES # BLD: 0.71 K/UL (ref 0.05–1.2)
MONOCYTES NFR BLD: 8.2 % (ref 3–10)
NEUTS SEG # BLD: 4.21 K/UL (ref 1.8–8)
NEUTS SEG NFR BLD: 48.3 % (ref 40–73)
NRBC # BLD: 0 K/UL (ref 0–0.01)
NRBC BLD-RTO: 0 PER 100 WBC
PLATELET # BLD AUTO: 246 K/UL (ref 135–420)
PMV BLD AUTO: 9.4 FL (ref 9.2–11.8)
POTASSIUM SERPL-SCNC: 3.5 MMOL/L (ref 3.5–5.5)
PROT SERPL-MCNC: 7.1 G/DL (ref 6.4–8.2)
RBC # BLD AUTO: 5.31 M/UL (ref 4.2–5.3)
SODIUM SERPL-SCNC: 139 MMOL/L (ref 136–145)
TRIGL SERPL-MCNC: 175 MG/DL
VLDLC SERPL CALC-MCNC: 35 MG/DL
WBC # BLD AUTO: 8.7 K/UL (ref 4.6–13.2)

## 2025-04-08 PROCEDURE — 83036 HEMOGLOBIN GLYCOSYLATED A1C: CPT

## 2025-04-08 PROCEDURE — 36415 COLL VENOUS BLD VENIPUNCTURE: CPT

## 2025-04-08 PROCEDURE — 85025 COMPLETE CBC W/AUTO DIFF WBC: CPT

## 2025-04-08 PROCEDURE — 80061 LIPID PANEL: CPT

## 2025-04-08 PROCEDURE — 82043 UR ALBUMIN QUANTITATIVE: CPT

## 2025-04-08 PROCEDURE — 82570 ASSAY OF URINE CREATININE: CPT

## 2025-04-08 PROCEDURE — 80053 COMPREHEN METABOLIC PANEL: CPT

## 2025-04-15 ENCOUNTER — OFFICE VISIT (OUTPATIENT)
Facility: CLINIC | Age: 85
End: 2025-04-15
Payer: MEDICARE

## 2025-04-15 VITALS
DIASTOLIC BLOOD PRESSURE: 82 MMHG | HEART RATE: 92 BPM | TEMPERATURE: 97.5 F | WEIGHT: 155 LBS | BODY MASS INDEX: 28.52 KG/M2 | HEIGHT: 62 IN | OXYGEN SATURATION: 94 % | RESPIRATION RATE: 18 BRPM | SYSTOLIC BLOOD PRESSURE: 149 MMHG

## 2025-04-15 DIAGNOSIS — R80.9 MICROALBUMINURIA: Primary | ICD-10-CM

## 2025-04-15 DIAGNOSIS — I48.0 PAF (PAROXYSMAL ATRIAL FIBRILLATION) (HCC): ICD-10-CM

## 2025-04-15 DIAGNOSIS — R74.8 ALKALINE PHOSPHATASE ELEVATION: ICD-10-CM

## 2025-04-15 DIAGNOSIS — K58.1 IRRITABLE BOWEL SYNDROME WITH CONSTIPATION: ICD-10-CM

## 2025-04-15 DIAGNOSIS — Z00.00 MEDICARE ANNUAL WELLNESS VISIT, SUBSEQUENT: ICD-10-CM

## 2025-04-15 DIAGNOSIS — R73.9 HYPERGLYCEMIA: ICD-10-CM

## 2025-04-15 DIAGNOSIS — Z71.89 ADVANCE CARE PLANNING: ICD-10-CM

## 2025-04-15 DIAGNOSIS — Z12.31 ENCOUNTER FOR SCREENING MAMMOGRAM FOR BREAST CANCER: ICD-10-CM

## 2025-04-15 DIAGNOSIS — N18.31 CHRONIC KIDNEY DISEASE, STAGE 3A (HCC): ICD-10-CM

## 2025-04-15 DIAGNOSIS — Z78.0 POSTMENOPAUSAL: ICD-10-CM

## 2025-04-15 DIAGNOSIS — I10 HYPERTENSION, UNSPECIFIED TYPE: ICD-10-CM

## 2025-04-15 PROCEDURE — G8419 CALC BMI OUT NRM PARAM NOF/U: HCPCS | Performed by: INTERNAL MEDICINE

## 2025-04-15 PROCEDURE — 1036F TOBACCO NON-USER: CPT | Performed by: INTERNAL MEDICINE

## 2025-04-15 PROCEDURE — G8399 PT W/DXA RESULTS DOCUMENT: HCPCS | Performed by: INTERNAL MEDICINE

## 2025-04-15 PROCEDURE — 1123F ACP DISCUSS/DSCN MKR DOCD: CPT | Performed by: INTERNAL MEDICINE

## 2025-04-15 PROCEDURE — 3079F DIAST BP 80-89 MM HG: CPT | Performed by: INTERNAL MEDICINE

## 2025-04-15 PROCEDURE — 3077F SYST BP >= 140 MM HG: CPT | Performed by: INTERNAL MEDICINE

## 2025-04-15 PROCEDURE — 99214 OFFICE O/P EST MOD 30 MIN: CPT | Performed by: INTERNAL MEDICINE

## 2025-04-15 PROCEDURE — 1090F PRES/ABSN URINE INCON ASSESS: CPT | Performed by: INTERNAL MEDICINE

## 2025-04-15 PROCEDURE — G8427 DOCREV CUR MEDS BY ELIG CLIN: HCPCS | Performed by: INTERNAL MEDICINE

## 2025-04-15 PROCEDURE — G0439 PPPS, SUBSEQ VISIT: HCPCS | Performed by: INTERNAL MEDICINE

## 2025-04-15 RX ORDER — LOSARTAN POTASSIUM 50 MG/1
50 TABLET ORAL DAILY
Qty: 90 TABLET | Refills: 1 | Status: SHIPPED | OUTPATIENT
Start: 2025-04-15

## 2025-04-15 SDOH — ECONOMIC STABILITY: FOOD INSECURITY: WITHIN THE PAST 12 MONTHS, YOU WORRIED THAT YOUR FOOD WOULD RUN OUT BEFORE YOU GOT MONEY TO BUY MORE.: NEVER TRUE

## 2025-04-15 SDOH — ECONOMIC STABILITY: FOOD INSECURITY: WITHIN THE PAST 12 MONTHS, THE FOOD YOU BOUGHT JUST DIDN'T LAST AND YOU DIDN'T HAVE MONEY TO GET MORE.: NEVER TRUE

## 2025-04-15 ASSESSMENT — PATIENT HEALTH QUESTIONNAIRE - PHQ9
SUM OF ALL RESPONSES TO PHQ QUESTIONS 1-9: 2
1. LITTLE INTEREST OR PLEASURE IN DOING THINGS: SEVERAL DAYS
SUM OF ALL RESPONSES TO PHQ QUESTIONS 1-9: 2
2. FEELING DOWN, DEPRESSED OR HOPELESS: SEVERAL DAYS

## 2025-04-15 ASSESSMENT — LIFESTYLE VARIABLES
HOW MANY STANDARD DRINKS CONTAINING ALCOHOL DO YOU HAVE ON A TYPICAL DAY: PATIENT DOES NOT DRINK
HOW OFTEN DO YOU HAVE A DRINK CONTAINING ALCOHOL: NEVER

## 2025-04-15 NOTE — PATIENT INSTRUCTIONS
Preventive Plan for Minnie Brasher - 4/15/2025  Medicare offers a range of preventive health benefits. Some of the tests and screenings are paid in full while other may be subject to a deductible, co-insurance, and/or copay.  Some of these benefits include a comprehensive review of your medical history including lifestyle, illnesses that may run in your family, and various assessments and screenings as appropriate.  After reviewing your medical record and screening and assessments performed today your provider may have ordered immunizations, labs, imaging, and/or referrals for you.  A list of these orders (if applicable) as well as your Preventive Care list are included within your After Visit Summary for your review.

## 2025-04-15 NOTE — PROGRESS NOTES
Minnie Brasher presents today for   Chief Complaint   Patient presents with    Medicare AWV     6 month follow up           \"Have you been to the ER, urgent care clinic since your last visit?  Hospitalized since your last visit?\"    NO    “Have you seen or consulted any other health care providers outside of [facility] since your last visit?”    NO             
bars or grab bars in your shower or bathtub?: (!) No  Interventions:  See AVS for additional education material     Advanced Directives:  Do you have a Living Will?: (!) No    Intervention:  see ACP note                 Objective   Vitals:    04/15/25 1354 04/15/25 1357   BP: (!) 140/88 (!) 149/82   BP Site: Right Upper Arm Left Upper Arm   Patient Position: Sitting Sitting   BP Cuff Size: Medium Adult Medium Adult   Pulse: 91 92   Resp: 18    Temp: 97.5 °F (36.4 °C)    TempSrc: Temporal    SpO2: 95% 94%   Weight: 70.3 kg (155 lb)    Height: 1.575 m (5' 2\")       Body mass index is 28.35 kg/m².                  Allergies   Allergen Reactions    Latex      Other reaction(s): Unable to Obtain  To tape with latex    Hydrocodone-Acetaminophen Swelling     Patient experienced throat swelling, hoarseness and difficulty swallowing.   Other reaction(s): wheezing/sob    Adhesive Tape Hives     Other reaction(s): rash/itching    Miralax [Polyethylene Glycol] Nausea Only    Codeine Nausea Only     Pt states she is not allergic.  Other reaction(s): gi distress  Nausea    Cymbalta [Duloxetine Hcl] Nausea Only     Prior to Visit Medications    Medication Sig Taking? Authorizing Provider   Tenapanor HCl (IBSRELA) 50 MG TABS Take 50 mg by mouth 2 times daily (with meals) Yes Provider, MD Isela   predniSONE (DELTASONE) 5 MG tablet Take 1 tablet by mouth daily Yes Caron Aguilar MD   QUEtiapine (SEROQUEL) 25 MG tablet Take 0.5 tablets by mouth nightly Yes Caron Aguilar MD   apixaban (ELIQUIS) 2.5 MG TABS tablet Take 1 tablet by mouth 2 times daily Yes Giovanny Qureshi MD   aspirin 81 MG EC tablet Take 1 tablet by mouth daily Yes Giovanny Qureshi MD   rosuvastatin (CRESTOR) 10 MG tablet Take 1 tablet by mouth nightly Yes Caron Aguilar MD   metoprolol succinate (TOPROL XL) 25 MG extended release tablet Take 1 tablet by mouth in the morning and 1 tablet in the evening. Yes Katiuska Ron PA-C [facility]

## 2025-04-16 ASSESSMENT — ENCOUNTER SYMPTOMS
BLOOD IN STOOL: 0
SORE THROAT: 0
EYE PAIN: 0
CONSTIPATION: 1
COUGH: 0
SHORTNESS OF BREATH: 0
ANAL BLEEDING: 0
ABDOMINAL PAIN: 1

## 2025-04-16 NOTE — ACP (ADVANCE CARE PLANNING)
Advance Care Planning     General Advance Care Planning (ACP) Conversation    Date of Conversation: 4/15/25    Conducted with: Patient with Decision Making Capacity    Healthcare Decision Maker:  Today we discussed her wishes for POAs.     Content/Action Overview:  Has NO ACP documents-Information provided    She wants her sister Marisa to be her primary POA. She does not want her son to be her POA - \"he lives too far away.\" She prefers one of her brothers to be her successor POA.  I encouraged her to complete her advance directive.    Length of Voluntary ACP Conversation in minutes:  <16 minutes (Non-Billable)    Caron Aguilar MD

## 2025-04-22 ENCOUNTER — TELEPHONE (OUTPATIENT)
Age: 85
End: 2025-04-22

## 2025-05-22 ENCOUNTER — LAB (OUTPATIENT)
Facility: CLINIC | Age: 85
End: 2025-05-22

## 2025-05-22 DIAGNOSIS — R74.8 ALKALINE PHOSPHATASE ELEVATION: ICD-10-CM

## 2025-05-23 LAB — SPECIMEN STATUS REPORT: NORMAL

## 2025-05-24 LAB
ALBUMIN SERPL-MCNC: 4.2 G/DL (ref 3.7–4.7)
ALP SERPL-CCNC: 60 IU/L (ref 44–121)
ALT SERPL-CCNC: 17 IU/L (ref 0–32)
AST SERPL-CCNC: 22 IU/L (ref 0–40)
BILIRUB SERPL-MCNC: 0.4 MG/DL (ref 0–1.2)
BUN SERPL-MCNC: 26 MG/DL (ref 8–27)
BUN/CREAT SERPL: 16 (ref 12–28)
CALCIUM SERPL-MCNC: 9.4 MG/DL (ref 8.7–10.3)
CHLORIDE SERPL-SCNC: 105 MMOL/L (ref 96–106)
CO2 SERPL-SCNC: 19 MMOL/L (ref 20–29)
CREAT SERPL-MCNC: 1.6 MG/DL (ref 0.57–1)
EGFRCR SERPLBLD CKD-EPI 2021: 31 ML/MIN/1.73
GLOBULIN SER CALC-MCNC: 2.1 G/DL (ref 1.5–4.5)
GLUCOSE SERPL-MCNC: 146 MG/DL (ref 70–99)
POTASSIUM SERPL-SCNC: 4.1 MMOL/L (ref 3.5–5.2)
PROT SERPL-MCNC: 6.3 G/DL (ref 6–8.5)
SODIUM SERPL-SCNC: 142 MMOL/L (ref 134–144)

## 2025-05-25 LAB
ALP BONE CFR SERPL: 25 % (ref 14–68)
ALP INTEST CFR SERPL: 18 % (ref 0–18)
ALP LIVER CFR SERPL: 57 % (ref 18–85)

## 2025-05-30 ENCOUNTER — OFFICE VISIT (OUTPATIENT)
Facility: CLINIC | Age: 85
End: 2025-05-30

## 2025-05-30 VITALS
TEMPERATURE: 97.9 F | OXYGEN SATURATION: 97 % | RESPIRATION RATE: 18 BRPM | DIASTOLIC BLOOD PRESSURE: 77 MMHG | HEART RATE: 90 BPM | HEIGHT: 62 IN | SYSTOLIC BLOOD PRESSURE: 128 MMHG | WEIGHT: 156 LBS | BODY MASS INDEX: 28.71 KG/M2

## 2025-05-30 DIAGNOSIS — N17.9 AKI (ACUTE KIDNEY INJURY): ICD-10-CM

## 2025-05-30 DIAGNOSIS — N30.00 ACUTE CYSTITIS WITHOUT HEMATURIA: ICD-10-CM

## 2025-05-30 DIAGNOSIS — I10 HYPERTENSION, UNSPECIFIED TYPE: ICD-10-CM

## 2025-05-30 DIAGNOSIS — I48.0 PAF (PAROXYSMAL ATRIAL FIBRILLATION) (HCC): Primary | ICD-10-CM

## 2025-05-30 DIAGNOSIS — R74.8 ALKALINE PHOSPHATASE ELEVATION: ICD-10-CM

## 2025-05-30 LAB
BILIRUBIN, URINE, POC: NEGATIVE
BLOOD URINE, POC: NEGATIVE
GLUCOSE URINE, POC: NEGATIVE
KETONES, URINE, POC: NEGATIVE
LEUKOCYTE ESTERASE, URINE, POC: NEGATIVE
NITRITE, URINE, POC: NEGATIVE
PH, URINE, POC: 5.5 (ref 4.6–8)
PROTEIN,URINE, POC: NORMAL
SPECIFIC GRAVITY, URINE, POC: 1.02 (ref 1–1.03)
URINALYSIS CLARITY, POC: CLEAR
URINALYSIS COLOR, POC: YELLOW
UROBILINOGEN, POC: NORMAL MG/DL

## 2025-06-02 LAB — SPECIMEN STATUS REPORT: NORMAL

## 2025-06-04 LAB — BACTERIA UR CULT: NO GROWTH

## 2025-06-05 LAB
ALBUMIN/CREAT UR: 75 MG/G CREAT (ref 0–29)
CREAT UR-MCNC: 106.2 MG/DL
MICROALBUMIN UR-MCNC: 79.6 UG/ML

## 2025-06-05 ASSESSMENT — ENCOUNTER SYMPTOMS
CONSTIPATION: 1
BLOOD IN STOOL: 0
ANAL BLEEDING: 0
COUGH: 0
SHORTNESS OF BREATH: 0
SORE THROAT: 0
EYE PAIN: 0

## 2025-06-07 DIAGNOSIS — M35.3 PMR (POLYMYALGIA RHEUMATICA): ICD-10-CM

## 2025-06-09 ENCOUNTER — TELEPHONE (OUTPATIENT)
Facility: CLINIC | Age: 85
End: 2025-06-09

## 2025-06-09 DIAGNOSIS — I48.0 PAF (PAROXYSMAL ATRIAL FIBRILLATION) (HCC): ICD-10-CM

## 2025-06-09 DIAGNOSIS — E78.5 HYPERLIPIDEMIA, UNSPECIFIED HYPERLIPIDEMIA TYPE: ICD-10-CM

## 2025-06-09 RX ORDER — ROSUVASTATIN CALCIUM 10 MG/1
10 TABLET, COATED ORAL NIGHTLY
Qty: 90 TABLET | Refills: 3 | Status: SHIPPED | OUTPATIENT
Start: 2025-06-09

## 2025-06-09 RX ORDER — PREDNISONE 5 MG/1
5 TABLET ORAL DAILY
Qty: 90 TABLET | Refills: 1 | Status: SHIPPED | OUTPATIENT
Start: 2025-06-09

## 2025-06-09 NOTE — TELEPHONE ENCOUNTER
Last OV: 5/30/2205  Next OV: 7/7/2025  Last refill: 11/22/2024    Please advise if an appointment is needed.

## 2025-06-09 NOTE — TELEPHONE ENCOUNTER
Pt called in states she has a bad cough, diarrhea, vomiting,   Onset 7 days ago. Pt want to either be seen or something called in please advise.     Pharmacy     [facility] [id number] - [place], [place] - [facility] [address] [phone] - F [phone] [[id number]]       Call back req

## 2025-06-10 ENCOUNTER — OFFICE VISIT (OUTPATIENT)
Facility: CLINIC | Age: 85
End: 2025-06-10
Payer: MEDICARE

## 2025-06-10 ENCOUNTER — HOSPITAL ENCOUNTER (OUTPATIENT)
Facility: HOSPITAL | Age: 85
Discharge: HOME | End: 2025-06-13
Attending: INTERNAL MEDICINE
Payer: MEDICARE

## 2025-06-10 VITALS
BODY MASS INDEX: 27.42 KG/M2 | RESPIRATION RATE: 16 BRPM | HEIGHT: 62 IN | DIASTOLIC BLOOD PRESSURE: 64 MMHG | SYSTOLIC BLOOD PRESSURE: 96 MMHG | OXYGEN SATURATION: 93 % | HEART RATE: 100 BPM | TEMPERATURE: 98.2 F | WEIGHT: 149 LBS

## 2025-06-10 DIAGNOSIS — I48.0 PAF (PAROXYSMAL ATRIAL FIBRILLATION) (HCC): ICD-10-CM

## 2025-06-10 DIAGNOSIS — F41.9 ANXIETY: ICD-10-CM

## 2025-06-10 DIAGNOSIS — R05.9 COUGH, UNSPECIFIED TYPE: ICD-10-CM

## 2025-06-10 DIAGNOSIS — R19.7 DIARRHEA, UNSPECIFIED TYPE: Primary | ICD-10-CM

## 2025-06-10 DIAGNOSIS — Z12.31 ENCOUNTER FOR SCREENING MAMMOGRAM FOR BREAST CANCER: ICD-10-CM

## 2025-06-10 DIAGNOSIS — R92.8 ABNORMAL MAMMOGRAM: ICD-10-CM

## 2025-06-10 DIAGNOSIS — Z78.0 POSTMENOPAUSAL: ICD-10-CM

## 2025-06-10 PROCEDURE — 3074F SYST BP LT 130 MM HG: CPT | Performed by: INTERNAL MEDICINE

## 2025-06-10 PROCEDURE — 99214 OFFICE O/P EST MOD 30 MIN: CPT | Performed by: INTERNAL MEDICINE

## 2025-06-10 PROCEDURE — 1036F TOBACCO NON-USER: CPT | Performed by: INTERNAL MEDICINE

## 2025-06-10 PROCEDURE — 3078F DIAST BP <80 MM HG: CPT | Performed by: INTERNAL MEDICINE

## 2025-06-10 PROCEDURE — G8399 PT W/DXA RESULTS DOCUMENT: HCPCS | Performed by: INTERNAL MEDICINE

## 2025-06-10 PROCEDURE — 77080 DXA BONE DENSITY AXIAL: CPT

## 2025-06-10 PROCEDURE — 77063 BREAST TOMOSYNTHESIS BI: CPT

## 2025-06-10 PROCEDURE — G8427 DOCREV CUR MEDS BY ELIG CLIN: HCPCS | Performed by: INTERNAL MEDICINE

## 2025-06-10 PROCEDURE — 1123F ACP DISCUSS/DSCN MKR DOCD: CPT | Performed by: INTERNAL MEDICINE

## 2025-06-10 PROCEDURE — G8419 CALC BMI OUT NRM PARAM NOF/U: HCPCS | Performed by: INTERNAL MEDICINE

## 2025-06-10 PROCEDURE — 1090F PRES/ABSN URINE INCON ASSESS: CPT | Performed by: INTERNAL MEDICINE

## 2025-06-10 RX ORDER — QUETIAPINE FUMARATE 25 MG/1
12.5 TABLET, FILM COATED ORAL
Qty: 45 TABLET | Refills: 3 | Status: SHIPPED | OUTPATIENT
Start: 2025-06-10

## 2025-06-10 RX ORDER — METOPROLOL SUCCINATE 25 MG/1
25 TABLET, EXTENDED RELEASE ORAL 2 TIMES DAILY
Qty: 180 TABLET | Refills: 3 | Status: SHIPPED | OUTPATIENT
Start: 2025-06-10

## 2025-06-10 NOTE — PROGRESS NOTES
Minnie Brasher presents today for   Chief Complaint   Patient presents with    Cough     Started 06/01     Diarrhea     Last day of diarrhea was 06/08    Vomiting           \"Have you been to the ER, urgent care clinic since your last visit?  Hospitalized since your last visit?\"    NO    “Have you seen or consulted any other health care providers outside of [facility] since your last visit?”    NO           
metoprolol 25 mg twice daily, Eliquis 2.5 mg twice daily, aspirin 81 mg daily, and losartan 50 mg daily          ICD-10-CM    1. Diarrhea, unspecified type  R19.7 Comprehensive Metabolic Panel     CBC with Auto Differential      2. Cough, unspecified type  R05.9       3. Abnormal mammogram  R92.8 [facility] DIGITAL DIAGNOSTIC UNILATERAL RIGHT     US BREAST LIMITED RIGHT      4. Anxiety  F41.9       5. PAF (paroxysmal atrial fibrillation) (HCC)  I48.0             Health Maintenance Due   Topic Date Due    DTaP/Tdap/Td vaccine (1 - Tdap) Never done    Shingles vaccine (1 of 2) Never done    Respiratory Syncytial Virus (RSV) Pregnant or age 60 yrs+ (1 - 1-dose 75+ series) Never done    COVID-19 Vaccine (1 - 2024-25 season) Never done         Lab review: labs are reviewed in the EHR and ordered as mentioned above    I have discussed the diagnosis with the patient and the intended plan as seen in the above orders.  The patient has received an after-visit summary and questions were answered concerning future plans.  I have discussed medication side effects and warnings with the patient as well. I have reviewed the plan of care with the patient, accepted their input and they are in agreement with the treatment goals. All questions were answered. The patient understands the plan of care. Handouts provided today with above information. Pt instructed if symptoms worsen to call the office or report to the ED for continued care.  Greater than 50% of the visit time was spent in counseling and/or coordination of care.      Voice recognition was used to generate this report, which may have resulted in some phonetic based errors in grammar and contents. Even though attempts were made to correct all the mistakes, some may have been missed, and remained in the body of the document.      No follow-up provider specified.    Caron Aguilar MD

## 2025-06-11 ENCOUNTER — RESULTS FOLLOW-UP (OUTPATIENT)
Facility: CLINIC | Age: 85
End: 2025-06-11

## 2025-06-12 ENCOUNTER — LAB (OUTPATIENT)
Facility: CLINIC | Age: 85
End: 2025-06-12

## 2025-06-12 DIAGNOSIS — R19.7 DIARRHEA, UNSPECIFIED TYPE: ICD-10-CM

## 2025-06-14 LAB
BASOPHILS # BLD AUTO: 0 X10E3/UL (ref 0–0.2)
BASOPHILS NFR BLD AUTO: 1 %
EOSINOPHIL # BLD AUTO: 0.2 X10E3/UL (ref 0–0.4)
EOSINOPHIL NFR BLD AUTO: 2 %
ERYTHROCYTE [DISTWIDTH] IN BLOOD BY AUTOMATED COUNT: 14.2 % (ref 11.7–15.4)
HCT VFR BLD AUTO: 48.2 % (ref 34–46.6)
HGB BLD-MCNC: 16.2 G/DL (ref 11.1–15.9)
IMM GRANULOCYTES # BLD AUTO: 0 X10E3/UL (ref 0–0.1)
IMM GRANULOCYTES NFR BLD AUTO: 0 %
LYMPHOCYTES # BLD AUTO: 2.7 X10E3/UL (ref 0.7–3.1)
LYMPHOCYTES NFR BLD AUTO: 37 %
MCH RBC QN AUTO: 31.2 PG (ref 26.6–33)
MCHC RBC AUTO-ENTMCNC: 33.6 G/DL (ref 31.5–35.7)
MCV RBC AUTO: 93 FL (ref 79–97)
MONOCYTES # BLD AUTO: 0.7 X10E3/UL (ref 0.1–0.9)
MONOCYTES NFR BLD AUTO: 10 %
NEUTROPHILS # BLD AUTO: 3.6 X10E3/UL (ref 1.4–7)
NEUTROPHILS NFR BLD AUTO: 49 %
PLATELET # BLD AUTO: 328 X10E3/UL (ref 150–450)
RBC # BLD AUTO: 5.19 X10E6/UL (ref 3.77–5.28)
SPECIMEN STATUS REPORT: NORMAL
WBC # BLD AUTO: 7.3 X10E3/UL (ref 3.4–10.8)

## 2025-06-16 ENCOUNTER — TELEPHONE (OUTPATIENT)
Facility: CLINIC | Age: 85
End: 2025-06-16

## 2025-06-16 LAB
ALBUMIN SERPL-MCNC: 4.1 G/DL (ref 3.7–4.7)
ALP SERPL-CCNC: 60 IU/L (ref 44–121)
ALT SERPL-CCNC: 17 IU/L (ref 0–32)
AST SERPL-CCNC: 33 IU/L (ref 0–40)
BILIRUB SERPL-MCNC: 0.5 MG/DL (ref 0–1.2)
BUN SERPL-MCNC: 29 MG/DL (ref 8–27)
BUN/CREAT SERPL: 26 (ref 12–28)
CALCIUM SERPL-MCNC: 9.7 MG/DL (ref 8.7–10.3)
CHLORIDE SERPL-SCNC: 100 MMOL/L (ref 96–106)
CO2 SERPL-SCNC: 20 MMOL/L (ref 20–29)
CREAT SERPL-MCNC: 1.13 MG/DL (ref 0.57–1)
EGFRCR SERPLBLD CKD-EPI 2021: 48 ML/MIN/1.73
GLOBULIN SER CALC-MCNC: 2.6 G/DL (ref 1.5–4.5)
GLUCOSE SERPL-MCNC: 107 MG/DL (ref 70–99)
POTASSIUM SERPL-SCNC: 4.6 MMOL/L (ref 3.5–5.2)
PROT SERPL-MCNC: 6.7 G/DL (ref 6–8.5)
SODIUM SERPL-SCNC: 138 MMOL/L (ref 134–144)

## 2025-06-16 ASSESSMENT — ENCOUNTER SYMPTOMS
ANAL BLEEDING: 0
SHORTNESS OF BREATH: 1
EYE PAIN: 0
ABDOMINAL PAIN: 0
COUGH: 0
SORE THROAT: 0
BLOOD IN STOOL: 0

## 2025-06-17 ENCOUNTER — RESULTS FOLLOW-UP (OUTPATIENT)
Facility: CLINIC | Age: 85
End: 2025-06-17

## 2025-06-17 NOTE — TELEPHONE ENCOUNTER
See results note.    Dr. Caron Aguilar  [facility] of [address], [address]  [place] [id number]  Phone: [phone]  Fax: [phone]

## 2025-07-01 ENCOUNTER — LAB (OUTPATIENT)
Facility: CLINIC | Age: 85
End: 2025-07-01

## 2025-07-01 DIAGNOSIS — N17.9 AKI (ACUTE KIDNEY INJURY): ICD-10-CM

## 2025-07-01 DIAGNOSIS — I10 HYPERTENSION, UNSPECIFIED TYPE: ICD-10-CM

## 2025-07-03 LAB — SPECIMEN STATUS REPORT: NORMAL

## 2025-07-04 LAB
BUN SERPL-MCNC: 17 MG/DL (ref 8–27)
BUN/CREAT SERPL: 18 (ref 12–28)
CALCIUM SERPL-MCNC: 9.4 MG/DL (ref 8.7–10.3)
CHLORIDE SERPL-SCNC: 103 MMOL/L (ref 96–106)
CO2 SERPL-SCNC: 19 MMOL/L (ref 20–29)
CREAT SERPL-MCNC: 0.92 MG/DL (ref 0.57–1)
EGFRCR SERPLBLD CKD-EPI 2021: 61 ML/MIN/1.73
GLUCOSE SERPL-MCNC: 120 MG/DL (ref 70–99)
POTASSIUM SERPL-SCNC: 4 MMOL/L (ref 3.5–5.2)
SODIUM SERPL-SCNC: 141 MMOL/L (ref 134–144)

## 2025-07-07 ENCOUNTER — OFFICE VISIT (OUTPATIENT)
Facility: CLINIC | Age: 85
End: 2025-07-07

## 2025-07-07 VITALS
HEIGHT: 62 IN | DIASTOLIC BLOOD PRESSURE: 79 MMHG | SYSTOLIC BLOOD PRESSURE: 135 MMHG | HEART RATE: 89 BPM | OXYGEN SATURATION: 95 % | RESPIRATION RATE: 17 BRPM | WEIGHT: 152.2 LBS | BODY MASS INDEX: 28.01 KG/M2 | TEMPERATURE: 98.2 F

## 2025-07-07 DIAGNOSIS — I48.0 PAF (PAROXYSMAL ATRIAL FIBRILLATION) (HCC): ICD-10-CM

## 2025-07-07 DIAGNOSIS — R19.7 DIARRHEA, UNSPECIFIED TYPE: Primary | ICD-10-CM

## 2025-07-07 DIAGNOSIS — R05.9 COUGH, UNSPECIFIED TYPE: ICD-10-CM

## 2025-07-07 DIAGNOSIS — Z13.220 SCREENING FOR HYPERLIPIDEMIA: ICD-10-CM

## 2025-07-07 DIAGNOSIS — R92.8 ABNORMAL MAMMOGRAM: ICD-10-CM

## 2025-07-07 DIAGNOSIS — R79.9 ABNORMAL FINDING OF BLOOD CHEMISTRY, UNSPECIFIED: ICD-10-CM

## 2025-07-07 DIAGNOSIS — R73.9 HYPERGLYCEMIA: ICD-10-CM

## 2025-07-07 NOTE — PROGRESS NOTES
INTERNISTS OF [facility]:  7/13/2025, MRN: [id number]      Minnie Brasher is a 84 y.o. female and presents to clinic for Follow-up (5 wks f/u)      Subjective:   The pt is an 85 yo female with a h/o HTN, AF, CAD, fracture, HLD, vitamin D deficiency, left breast cancer status post lumpectomy, HLD, rib fractures, diverticulosis (per imaging), cervical facet arthropathy, PMR, cervical disc disease, anxiety, hearing loss, prediabetes, hiatal hernia, umbilical hernia, IBS-C, thyroid nodule hx (followed by ENT), right rotator cuff tendinopathy.      1.  Diarrhea and MISTY: Reported at her last appointment.  In the setting of IBS-C treated with medication.  Followed by GI.  At that time she was taking ibresla 50 milligrams twice daily.  Today she reports: her sx resolved with taking ibresla 50mg once a wk per bid since her last apt.  The GI physician put her on a different rx. She hasn't tried it. She has rectal bleeding with straining. Her GI provider did a TERENCE since her last apt. No abdominal pain. At the time of her last appointment, she was losing weight.  Her weight is up to 152 pounds 149 pounds last month.     Latest Reference Range & Units 06/12/25 11:08 07/01/25 10:46   Creatinine 0.57 - 1.00 mg/dL 1.13 (H) 0.92   Bun/Cre 12 - 28  26 18   Est, Glom Filt Rate >59 mL/min/1.73 48 (L) 61   (H): Data is abnormally high  (L): Data is abnormally low    2.  Common cold and Cough Hx: At her last appointment she had a cough and physical exam findings were consistent with a viral infection.  Today she reports: she has a residual cough but \"it's better.\" +Fatigue. Someone in her building smokes cannabis and it worsens her breathing.Cannabis exposure (secondhand) makes her cough sx worsen.     3.  Abnormal mammogram: At her last appointment I ordered a diagnostic right breast mammogram and ultrasound due to her mammogram earlier this year that was abnormal.  She has yet to schedule her mammogram and ultrasound study.  Today she

## 2025-07-07 NOTE — PROGRESS NOTES
Minnie Brasher presents today for   Chief Complaint   Patient presents with    Follow-up     5 wks f/u           \"Have you been to the ER, urgent care clinic since your last visit?  Hospitalized since your last visit?\"    NO    “Have you seen or consulted any other health care providers outside of [facility] since your last visit?”    NO

## 2025-08-12 ENCOUNTER — TELEPHONE (OUTPATIENT)
Facility: CLINIC | Age: 85
End: 2025-08-12

## 2025-09-04 ENCOUNTER — TELEPHONE (OUTPATIENT)
Facility: CLINIC | Age: 85
End: 2025-09-04

## 2025-09-05 RX ORDER — QUETIAPINE FUMARATE 25 MG/1
12.5 TABLET, FILM COATED ORAL
Qty: 45 TABLET | Refills: 3 | Status: SHIPPED | OUTPATIENT
Start: 2025-09-05

## (undated) DEVICE — ARM DRAPE

## (undated) DEVICE — MAYO STAND COVER: Brand: CONVERTORS

## (undated) DEVICE — PREP SKN CHLRAPRP 26ML TNT -- CONVERT TO ITEM 373320

## (undated) DEVICE — 3M™ IOBAN™ 2 ANTIMICROBIAL INCISE DRAPE 6651EZ: Brand: IOBAN™ 2

## (undated) DEVICE — MEDI-VAC SUCTION HIGH CAPACITY: Brand: CARDINAL HEALTH

## (undated) DEVICE — INSTRMT SET WND CLSR SUT PASS --

## (undated) DEVICE — SUTURE VCRL SZ 0 L18IN ABSRB UD POLYGLACTIN 910 BRAID TIE J912G

## (undated) DEVICE — SYR 10ML LUER LOK 1/5ML GRAD --

## (undated) DEVICE — KIT CLN UP BON SECOURS MARYV

## (undated) DEVICE — CATHETER SUCT TR FL TIP 14FR W/ O CTRL

## (undated) DEVICE — GLOVE SURG BIOGEL 8.0 STRL -- SKINSENSE

## (undated) DEVICE — LAPAROSCOPIC SMOKE FILTRATION SYSTEM: Brand: PALL LAPAROSHIELD® PLUS LAPAROSCOPIC SMOKE FILTRATION SYSTEM

## (undated) DEVICE — SUTURE MCRYL SZ 4-0 L27IN ABSRB UD L24MM PS-1 3/8 CIR PRIM Y935H

## (undated) DEVICE — BINDER ABD H12IN COT FOR 45-62IN WAIST UNIV PREM 4 PNL DSGN

## (undated) DEVICE — Device

## (undated) DEVICE — SOFT SILICONE HYDROCELLULAR SACRUM DRESSING WITH LOCK AWAY LAYER: Brand: ALLEVYN LIFE SACRUM (LARGE) PACK OF 10

## (undated) DEVICE — DRAPE TOWEL: Brand: CONVERTORS

## (undated) DEVICE — ELECTRO LUBE IS A SINGLE PATIENT USE DEVICE THAT IS INTENDED TO BE USED ON ELECTROSURGICAL ELECTRODES TO REDUCE STICKING.: Brand: KEY SURGICAL ELECTRO LUBE

## (undated) DEVICE — TROCAR: Brand: KII FIOS FIRST ENTRY

## (undated) DEVICE — SUTURE V-LOC 180 SZ 0 L9IN ABSRB GRN GS-21 L37MM 1/2 CIR VLOCL0346

## (undated) DEVICE — SYRINGE MED 25GA 3ML L5/8IN SUBQ PLAS W/ DETACH NDL SFTY

## (undated) DEVICE — STRIP,CLOSURE,WOUND,MEDI-STRIP,1/2X4: Brand: MEDLINE

## (undated) DEVICE — THREE-QUARTER SHEET: Brand: CONVERTORS

## (undated) DEVICE — DRAPE SURG INTUITIVE PT CART MON

## (undated) DEVICE — FLEX ADVANTAGE 3000CC: Brand: FLEX ADVANTAGE

## (undated) DEVICE — COVER LT HNDL FLX

## (undated) DEVICE — TIP COVER ACCESSORY

## (undated) DEVICE — NEEDLE HYPO 22GA L1 1/2IN PIVOTING SHLD FOR LUERLOCK SYR

## (undated) DEVICE — MONOPOLAR CURVED SCISSORS: Brand: ENDOWRIST

## (undated) DEVICE — CANNULA SEAL

## (undated) DEVICE — (D)GLOVE SURG TRIFLX 7.5 PWD L -- DISC BY MFR USE ITEM 302993

## (undated) DEVICE — FLUFF AND POLYMER UNDERPAD,EXTRA HEAVY: Brand: WINGS

## (undated) DEVICE — AIRLIFE™ ADULT CUSHION NASAL CANNULA 14 FOOT (4.3) CRUSH-RESISTANT OXYGEN TUBING, AND U/CONNECT-IT ADAPTER: Brand: AIRLIFE™

## (undated) DEVICE — CARTRIDGE CLP LIG HEMLOK GRN --

## (undated) DEVICE — BLADELESS OBTURATOR: Brand: WECK VISTA

## (undated) DEVICE — SUTURE ABSRB L6IN L37MM 0 GS-21 GRN 1/2 CIR TAPR PNT NDL VLOCL0306

## (undated) DEVICE — STERILE POLYISOPRENE POWDER-FREE SURGICAL GLOVES: Brand: PROTEXIS

## (undated) DEVICE — SOLUTION IRRIG 1000ML H2O STRL BLT

## (undated) DEVICE — SUTURE VCRL SZ 2-0 L27IN ABSRB VLT L26MM CT-2 1/2 CIR J333H

## (undated) DEVICE — NEEDLE HYPO 25GA L1.5IN BVL ORIENTED ECLIPSE

## (undated) DEVICE — 3M™ BAIR PAWS FLEX™ WARMING GOWN, STANDARD, 20 PER CASE 81003: Brand: BAIR PAWS™

## (undated) DEVICE — COVER LT HNDL BLU STRL -- MEDICHOICE

## (undated) DEVICE — (D)PACK ICE DISP -- DISC BY MFR

## (undated) DEVICE — REM POLYHESIVE ADULT PATIENT RETURN ELECTRODE: Brand: VALLEYLAB

## (undated) DEVICE — BASIN EMESIS 500CC ROSE 250/CS 60/PLT: Brand: MEDEGEN MEDICAL PRODUCTS, LLC

## (undated) DEVICE — SYR 50ML SLIP TIP NSAF LF STRL --

## (undated) DEVICE — BITE BLOCK ENDOSCP UNIV AD 6 TO 9.4 MM

## (undated) DEVICE — (D)GLOVE EXAM LG NITRL NS -- DISC BY MFR NO SUB

## (undated) DEVICE — MEDI-VAC NON-CONDUCTIVE SUCTION TUBING: Brand: CARDINAL HEALTH

## (undated) DEVICE — INTENDED FOR TISSUE SEPARATION, AND OTHER PROCEDURES THAT REQUIRE A SHARP SURGICAL BLADE TO PUNCTURE OR CUT.: Brand: BARD-PARKER ® CARBON RIB-BACK BLADES

## (undated) DEVICE — SOLUTION IV 1000ML 0.9% SOD CHL

## (undated) DEVICE — COLUMN DRAPE

## (undated) DEVICE — GARMENT,MEDLINE,DVT,SEQUENTIAL,CALF,MD: Brand: MEDLINE

## (undated) DEVICE — ENDOSCOPY PUMP TUBING/ CAP SET: Brand: ERBE

## (undated) DEVICE — SUTURE MCRYL SZ 4-0 L18IN ABSRB UD L19MM PS-2 3/8 CIR PRIM Y496G

## (undated) DEVICE — (D)DRSG BORD MPLX SACRUM 23X23 -- DISC BY MFR USE ITEM 340908

## (undated) DEVICE — FCPS RAD JAW 4LC 240CM W/NDL -- BX/20 RADIAL JAW 4

## (undated) DEVICE — INTENDED FOR TISSUE SEPARATION, AND OTHER PROCEDURES THAT REQUIRE A SHARP SURGICAL BLADE TO PUNCTURE OR CUT.: Brand: BARD-PARKER ®  SAFETY SCALPED

## (undated) DEVICE — TABLE COVER: Brand: CONVERTORS

## (undated) DEVICE — DERMABOND SKIN ADH 0.7ML -- DERMABOND ADVANCED 12/BX

## (undated) DEVICE — GAUZE SPONGES,16 PLY: Brand: CURITY

## (undated) DEVICE — KENDALL SCD EXPRESS SLEEVES, KNEE LENGTH, MEDIUM: Brand: KENDALL SCD

## (undated) DEVICE — VISUALIZATION SYSTEM: Brand: CLEARIFY

## (undated) DEVICE — TRAY CATH OD16FR SIL URIN M STATLOK STBL DEV SURSTP

## (undated) DEVICE — AIRLIFE™ NASAL OXYGEN CANNULA CURVED, FLARED TIP WITH 14 FOOT (4.3 M) CRUSH-RESISTANT TUBING, OVER-THE-EAR STYLE: Brand: AIRLIFE™

## (undated) DEVICE — BLADELESS OPTICAL TROCAR WITH FIXATION CANNULA: Brand: VERSAONE

## (undated) DEVICE — CORD ES L10FT MPLR LAP

## (undated) DEVICE — LARGE NEEDLE DRIVER: Brand: ENDOWRIST;DAVINCI SI

## (undated) DEVICE — COVER MPLR TIP CRV SCIS ACC DA VINCI

## (undated) DEVICE — SEAL UNIV 5-8MM DISP BX/10 -- DA VINCI XI - SNGL USE

## (undated) DEVICE — BLANKET WRM AD W50XL85.8IN PACU FULL BODY FORC AIR

## (undated) DEVICE — DRAPE INSTR ARM ROBOTIC ENDOWRIST DA VINCI S

## (undated) DEVICE — GLOVE SURG SZ 8 L11.77IN FNGR THK9.8MIL STRW LTX POLYMER